# Patient Record
Sex: FEMALE | Race: WHITE | Employment: PART TIME | URBAN - METROPOLITAN AREA
[De-identification: names, ages, dates, MRNs, and addresses within clinical notes are randomized per-mention and may not be internally consistent; named-entity substitution may affect disease eponyms.]

---

## 2017-01-13 ENCOUNTER — OFFICE VISIT (OUTPATIENT)
Dept: FAMILY MEDICINE CLINIC | Age: 27
End: 2017-01-13

## 2017-01-13 VITALS
RESPIRATION RATE: 16 BRPM | WEIGHT: 133 LBS | HEIGHT: 61 IN | DIASTOLIC BLOOD PRESSURE: 70 MMHG | TEMPERATURE: 98.5 F | SYSTOLIC BLOOD PRESSURE: 120 MMHG | BODY MASS INDEX: 25.11 KG/M2 | HEART RATE: 98 BPM

## 2017-01-13 DIAGNOSIS — F33.1 MODERATE EPISODE OF RECURRENT MAJOR DEPRESSIVE DISORDER (HCC): Primary | ICD-10-CM

## 2017-01-13 PROCEDURE — 99214 OFFICE O/P EST MOD 30 MIN: CPT | Performed by: FAMILY MEDICINE

## 2017-01-13 RX ORDER — FLUOXETINE HYDROCHLORIDE 20 MG/1
20 CAPSULE ORAL DAILY
Qty: 30 CAPSULE | Refills: 2 | Status: SHIPPED | OUTPATIENT
Start: 2017-01-13 | End: 2017-03-30 | Stop reason: SDUPTHER

## 2017-01-13 RX ORDER — QUETIAPINE FUMARATE 50 MG/1
TABLET, EXTENDED RELEASE ORAL
Qty: 60 TABLET | Refills: 0 | Status: SHIPPED | OUTPATIENT
Start: 2017-01-13 | End: 2017-02-08

## 2017-01-17 ENCOUNTER — TELEPHONE (OUTPATIENT)
Dept: FAMILY MEDICINE CLINIC | Age: 27
End: 2017-01-17

## 2017-01-19 ENCOUNTER — TELEPHONE (OUTPATIENT)
Dept: FAMILY MEDICINE CLINIC | Age: 27
End: 2017-01-19

## 2017-01-20 ENCOUNTER — TELEPHONE (OUTPATIENT)
Dept: FAMILY MEDICINE CLINIC | Age: 27
End: 2017-01-20

## 2017-01-23 ENCOUNTER — TELEPHONE (OUTPATIENT)
Dept: FAMILY MEDICINE CLINIC | Age: 27
End: 2017-01-23

## 2017-02-08 RX ORDER — ARIPIPRAZOLE 5 MG/1
5 TABLET ORAL NIGHTLY
Qty: 30 TABLET | Refills: 1 | Status: SHIPPED | OUTPATIENT
Start: 2017-02-08 | End: 2017-03-30 | Stop reason: SDUPTHER

## 2017-03-30 ENCOUNTER — OFFICE VISIT (OUTPATIENT)
Dept: FAMILY MEDICINE CLINIC | Age: 27
End: 2017-03-30

## 2017-03-30 VITALS
RESPIRATION RATE: 20 BRPM | HEIGHT: 61 IN | BODY MASS INDEX: 24.73 KG/M2 | DIASTOLIC BLOOD PRESSURE: 70 MMHG | HEART RATE: 74 BPM | WEIGHT: 131 LBS | SYSTOLIC BLOOD PRESSURE: 112 MMHG

## 2017-03-30 DIAGNOSIS — F33.1 MODERATE EPISODE OF RECURRENT MAJOR DEPRESSIVE DISORDER (HCC): ICD-10-CM

## 2017-03-30 PROCEDURE — 99214 OFFICE O/P EST MOD 30 MIN: CPT | Performed by: FAMILY MEDICINE

## 2017-03-30 RX ORDER — ARIPIPRAZOLE 5 MG/1
5 TABLET ORAL NIGHTLY
Qty: 30 TABLET | Refills: 3 | Status: ON HOLD | OUTPATIENT
Start: 2017-03-30 | End: 2019-01-14

## 2017-03-30 RX ORDER — FLUOXETINE HYDROCHLORIDE 20 MG/1
20 CAPSULE ORAL DAILY
Qty: 30 CAPSULE | Refills: 3 | Status: ON HOLD | OUTPATIENT
Start: 2017-03-30 | End: 2019-01-14

## 2018-07-23 LAB
ABO/RH: NORMAL
ANTIBODY SCREEN: NORMAL
HEPATITIS C ANTIBODY INTERPRETATION: NORMAL

## 2018-07-24 LAB
BASOPHILS ABSOLUTE: 0.1 K/UL (ref 0–0.2)
BASOPHILS RELATIVE PERCENT: 0.5 %
EOSINOPHILS ABSOLUTE: 0 K/UL (ref 0–0.6)
EOSINOPHILS RELATIVE PERCENT: 0.4 %
HCT VFR BLD CALC: 39.5 % (ref 36–48)
HEMOGLOBIN: 13.8 G/DL (ref 12–16)
HEPATITIS B SURFACE ANTIGEN INTERPRETATION: NORMAL
HIV AG/AB: NORMAL
HIV ANTIGEN: NORMAL
HIV-1 ANTIBODY: NORMAL
HIV-2 AB: NORMAL
LYMPHOCYTES ABSOLUTE: 1.9 K/UL (ref 1–5.1)
LYMPHOCYTES RELATIVE PERCENT: 18.4 %
MCH RBC QN AUTO: 30.7 PG (ref 26–34)
MCHC RBC AUTO-ENTMCNC: 34.9 G/DL (ref 31–36)
MCV RBC AUTO: 88.1 FL (ref 80–100)
MONOCYTES ABSOLUTE: 1 K/UL (ref 0–1.3)
MONOCYTES RELATIVE PERCENT: 9.1 %
NEUTROPHILS ABSOLUTE: 7.5 K/UL (ref 1.7–7.7)
NEUTROPHILS RELATIVE PERCENT: 71.6 %
PDW BLD-RTO: 12.4 % (ref 12.4–15.4)
PLATELET # BLD: 326 K/UL (ref 135–450)
PMV BLD AUTO: 9.7 FL (ref 5–10.5)
RBC # BLD: 4.48 M/UL (ref 4–5.2)
RPR: NORMAL
RUBELLA ANTIBODY IGG: 344.6 IU/ML
WBC # BLD: 10.4 K/UL (ref 4–11)

## 2018-07-26 LAB
ORGANISM: ABNORMAL
URINE CULTURE, ROUTINE: ABNORMAL
URINE CULTURE, ROUTINE: ABNORMAL

## 2018-07-30 LAB
HPV COMMENT: ABNORMAL
HPV TYPE 16: NOT DETECTED
HPV TYPE 18: NOT DETECTED
HPVOH (OTHER TYPES): DETECTED

## 2018-08-29 LAB — URINE CULTURE, ROUTINE: NORMAL

## 2018-09-11 LAB
ANION GAP SERPL CALCULATED.3IONS-SCNC: 11 MMOL/L (ref 3–16)
BUN BLDV-MCNC: 5 MG/DL (ref 7–20)
CALCIUM SERPL-MCNC: 9 MG/DL (ref 8.3–10.6)
CHLORIDE BLD-SCNC: 105 MMOL/L (ref 99–110)
CO2: 21 MMOL/L (ref 21–32)
CREAT SERPL-MCNC: <0.5 MG/DL (ref 0.6–1.1)
GFR AFRICAN AMERICAN: >60
GFR NON-AFRICAN AMERICAN: >60
GLUCOSE BLD-MCNC: 99 MG/DL (ref 70–99)
GLUCOSE CHALLENGE: 92 MG/DL
HCT VFR BLD CALC: 35.8 % (ref 36–48)
HEMOGLOBIN: 12.2 G/DL (ref 12–16)
MCH RBC QN AUTO: 30.6 PG (ref 26–34)
MCHC RBC AUTO-ENTMCNC: 34.2 G/DL (ref 31–36)
MCV RBC AUTO: 89.4 FL (ref 80–100)
PDW BLD-RTO: 13.4 % (ref 12.4–15.4)
PLATELET # BLD: 270 K/UL (ref 135–450)
PMV BLD AUTO: 9.9 FL (ref 5–10.5)
POTASSIUM SERPL-SCNC: 4 MMOL/L (ref 3.5–5.1)
RBC # BLD: 4 M/UL (ref 4–5.2)
SODIUM BLD-SCNC: 137 MMOL/L (ref 136–145)
WBC # BLD: 9.2 K/UL (ref 4–11)

## 2018-10-01 ENCOUNTER — HOSPITAL ENCOUNTER (EMERGENCY)
Age: 28
Discharge: HOME | End: 2018-10-01
Attending: EMERGENCY MEDICINE
Payer: MEDICARE

## 2018-10-01 ENCOUNTER — APPOINTMENT (OUTPATIENT)
Dept: ULTRASOUND IMAGING | Age: 28
End: 2018-10-01
Payer: MEDICARE

## 2018-10-01 VITALS
OXYGEN SATURATION: 100 % | RESPIRATION RATE: 18 BRPM | DIASTOLIC BLOOD PRESSURE: 74 MMHG | HEIGHT: 61 IN | HEART RATE: 74 BPM | WEIGHT: 149 LBS | BODY MASS INDEX: 28.13 KG/M2 | TEMPERATURE: 99.7 F | SYSTOLIC BLOOD PRESSURE: 122 MMHG

## 2018-10-01 DIAGNOSIS — O26.899 ABDOMINAL PAIN DURING PREGNANCY, ANTEPARTUM: ICD-10-CM

## 2018-10-01 DIAGNOSIS — N30.00 ACUTE CYSTITIS WITHOUT HEMATURIA: Primary | ICD-10-CM

## 2018-10-01 DIAGNOSIS — N39.0 COMPLICATED UTI (URINARY TRACT INFECTION): ICD-10-CM

## 2018-10-01 DIAGNOSIS — R10.9 ABDOMINAL PAIN DURING PREGNANCY, ANTEPARTUM: ICD-10-CM

## 2018-10-01 LAB
ANION GAP SERPL CALCULATED.3IONS-SCNC: 12 MMOL/L (ref 3–16)
BACTERIA: ABNORMAL /HPF
BILIRUBIN URINE: NEGATIVE
BLOOD, URINE: NEGATIVE
BUN BLDV-MCNC: 6 MG/DL (ref 7–20)
CALCIUM SERPL-MCNC: 9.5 MG/DL (ref 8.3–10.6)
CHLORIDE BLD-SCNC: 105 MMOL/L (ref 99–110)
CLARITY: ABNORMAL
CO2: 20 MMOL/L (ref 21–32)
COLOR: YELLOW
CREAT SERPL-MCNC: <0.5 MG/DL (ref 0.6–1.1)
EPITHELIAL CELLS, UA: 4 /HPF (ref 0–5)
GFR AFRICAN AMERICAN: >60
GFR NON-AFRICAN AMERICAN: >60
GLUCOSE BLD-MCNC: 97 MG/DL (ref 70–99)
GLUCOSE URINE: NEGATIVE MG/DL
HCT VFR BLD CALC: 36.7 % (ref 36–48)
HEMOGLOBIN: 12.8 G/DL (ref 12–16)
HYALINE CASTS: 4 /LPF (ref 0–8)
KETONES, URINE: NEGATIVE MG/DL
LEUKOCYTE ESTERASE, URINE: ABNORMAL
MCH RBC QN AUTO: 31.1 PG (ref 26–34)
MCHC RBC AUTO-ENTMCNC: 34.9 G/DL (ref 31–36)
MCV RBC AUTO: 89.1 FL (ref 80–100)
MICROSCOPIC EXAMINATION: YES
NITRITE, URINE: NEGATIVE
PDW BLD-RTO: 12.7 % (ref 12.4–15.4)
PH UA: 6.5
PLATELET # BLD: 293 K/UL (ref 135–450)
PMV BLD AUTO: 9.1 FL (ref 5–10.5)
POTASSIUM SERPL-SCNC: 3.9 MMOL/L (ref 3.5–5.1)
PROTEIN UA: NEGATIVE MG/DL
RBC # BLD: 4.12 M/UL (ref 4–5.2)
RBC UA: 2 /HPF (ref 0–4)
SODIUM BLD-SCNC: 137 MMOL/L (ref 136–145)
SPECIFIC GRAVITY UA: 1.02
URINE TYPE: ABNORMAL
UROBILINOGEN, URINE: 0.2 E.U./DL
WBC # BLD: 10.8 K/UL (ref 4–11)
WBC UA: 20 /HPF (ref 0–5)

## 2018-10-01 PROCEDURE — 81001 URINALYSIS AUTO W/SCOPE: CPT

## 2018-10-01 PROCEDURE — 80048 BASIC METABOLIC PNL TOTAL CA: CPT

## 2018-10-01 PROCEDURE — 99284 EMERGENCY DEPT VISIT MOD MDM: CPT

## 2018-10-01 PROCEDURE — 6370000000 HC RX 637 (ALT 250 FOR IP): Performed by: PHYSICIAN ASSISTANT

## 2018-10-01 PROCEDURE — 85027 COMPLETE CBC AUTOMATED: CPT

## 2018-10-01 PROCEDURE — 76815 OB US LIMITED FETUS(S): CPT

## 2018-10-01 RX ORDER — CEFUROXIME AXETIL 250 MG/1
250 TABLET ORAL 2 TIMES DAILY
Qty: 20 TABLET | Refills: 0 | Status: SHIPPED | OUTPATIENT
Start: 2018-10-01 | End: 2018-10-11

## 2018-10-01 RX ORDER — NITROFURANTOIN 25; 75 MG/1; MG/1
100 CAPSULE ORAL ONCE
Status: COMPLETED | OUTPATIENT
Start: 2018-10-01 | End: 2018-10-01

## 2018-10-01 RX ORDER — ACETAMINOPHEN 500 MG
1000 TABLET ORAL ONCE
Status: COMPLETED | OUTPATIENT
Start: 2018-10-01 | End: 2018-10-01

## 2018-10-01 RX ADMIN — ACETAMINOPHEN 1000 MG: 500 TABLET ORAL at 01:31

## 2018-10-01 RX ADMIN — NITROFURANTOIN MONOHYDRATE/MACROCRYSTALLINE 100 MG: 25; 75 CAPSULE ORAL at 01:32

## 2018-10-01 ASSESSMENT — ENCOUNTER SYMPTOMS
WHEEZING: 0
COLOR CHANGE: 0
DIARRHEA: 0
BLOOD IN STOOL: 0
NAUSEA: 0
ANAL BLEEDING: 0
CONSTIPATION: 0
COUGH: 0
BACK PAIN: 0
ABDOMINAL DISTENTION: 0
VOMITING: 0
STRIDOR: 0
RECTAL PAIN: 0
SHORTNESS OF BREATH: 0

## 2018-10-01 ASSESSMENT — PAIN SCALES - GENERAL
PAINLEVEL_OUTOF10: 5
PAINLEVEL_OUTOF10: 3

## 2018-10-01 ASSESSMENT — PAIN DESCRIPTION - PROGRESSION: CLINICAL_PROGRESSION: GRADUALLY IMPROVING

## 2018-10-01 NOTE — ED PROVIDER NOTES
[address] [facility]  eMERGENCY dEPARTMENT eNCOUnter        Pt Name: Regina Kiser  MRN: [id number]  [place] 1990  Date of evaluation: 10/1/2018  Provider: Milton Birmingham PA-C  PCP: Jeimy Santos MD  ED Attending: Maile Pablo DO    CHIEF COMPLAINT       Chief Complaint   Patient presents with    Abdominal Pain     abd pain in pregnancy described as efren smith contractions for the past few days, more intense pain started a few hours ago, 5th pregnancy 2 living children, denies vaginal bleeding. see Dr. Reid File . denies dysuria having nausea        HISTORY OF PRESENT ILLNESS   (Location/Symptom, Timing/Onset, Context/Setting, Quality, Duration, Modifying Factors, Severity)  Note limiting factors. Regina Kiser is a 33 y.o. female with history of preeclampsia, gestational diabetes, anxiety, depression who presents to the emergency department with low abdominal pain and pregnancy. She approximates herself to be 18 weeks pregnant with a last menstrual cycle in May. Obstetric history is  A2 L2. Dr. Reid File is her ObGyn. At 12 weeks, she had an ultrasound which showed intrauterine pregnancy. She denies any vaginal bleeding or discharge or urinary symptoms. She rates her pain to be a 5 out of 10 on pain scale. Nursing Notes were all reviewed and agreed with or any disagreements were addressed  in the HPI. REVIEW OF SYSTEMS    (2-9 systems for level 4, 10 or more for level 5)     Review of Systems   Constitutional: Negative for chills and fever. Eyes: Negative for visual disturbance. Respiratory: Negative for cough, shortness of breath, wheezing and stridor. Cardiovascular: Negative for chest pain, palpitations and leg swelling. Gastrointestinal: Negative for abdominal distention, anal bleeding, blood in stool, constipation, diarrhea, nausea, rectal pain and vomiting. Genitourinary: Positive for pelvic pain.  Negative for decreased urine Family History   Problem Relation Age of Onset    Arthritis Mother     Depression Mother     Arthritis Father     Depression Father     Hearing Loss Sister     Learning Disabilities Brother     Mental Retardation Brother     Cancer Maternal Grandmother     Cancer Paternal Grandfather     Kidney Disease Paternal Grandfather           SOCIAL HISTORY       Social History     Social History    Marital status:      Spouse name: N/A    Number of children: N/A    Years of education: N/A     Social History Main Topics    Smoking status: Former Smoker     Packs/day: 0.50     Types: Cigarettes    Smokeless tobacco: Never Used      Comment: did quit but started again    Alcohol use 0.6 oz/week     1 Glasses of wine per week    Drug use: No    Sexual activity: Yes     Partners: Male     Other Topics Concern    None     Social History Narrative    None       SCREENINGS             PHYSICAL EXAM    (up to 7 for level 4, 8 or more for level 5)     ED Triage Vitals [10/01/18 0022]   BP Temp Temp src Pulse Resp SpO2 Height Weight   126/78 99.7 °F (37.6 °C) -- 96 22 98 % 5' 1\" (1.549 m) 149 lb (67.6 kg)       Physical Exam   Constitutional: She is oriented to person, place, and time. She appears well-developed and well-nourished. No distress. HENT:   Head: Normocephalic and atraumatic. Right Ear: External ear normal.   Left Ear: External ear normal.   Nose: Nose normal.   Eyes: Conjunctivae are normal. Right eye exhibits no discharge. Left eye exhibits no discharge. No scleral icterus. Neck: Normal range of motion. No JVD present. No Brudzinski's sign and no Kernig's sign noted. Cardiovascular: Normal rate. Pulmonary/Chest: Effort normal and breath sounds normal. No stridor. Abdominal: Soft. Bowel sounds are normal. She exhibits no abdominal bruit and no pulsatile midline mass. There is tenderness in the suprapubic area.  There is no rigidity, no rebound, no guarding, no CVA tenderness, no visualized and preliminarily interpreted by the  ED Provider with the below findings:        Interpretation per the Radiologist below, if available at the time of this note:    US OB 1 [facility]   Final Result   Single live intrauterine pregnancy with gestational age of 12 weeks and 6   days by current sonographic biometry. The estimated due date is 03/05/2019. Normal flow in both ovaries. No results found. PROCEDURES   Unless otherwise noted below, none     Procedures    CRITICAL CARE TIME   N/A    CONSULTS:  None      EMERGENCY DEPARTMENT COURSE and DIFFERENTIAL DIAGNOSIS/MDM:   Vitals:    Vitals:    10/01/18 0022   BP: 126/78   Pulse: 96   Resp: 22   Temp: 99.7 °F (37.6 °C)   SpO2: 98%   Weight: 149 lb (67.6 kg)   Height: 5' 1\" (1.549 m)       Patient was given the following medications:  Medications   nitrofurantoin (macrocrystal-monohydrate) (MACROBID) capsule 100 mg (100 mg Oral Given 10/1/18 0132)   acetaminophen (TYLENOL) tablet 1,000 mg (1,000 mg Oral Given 10/1/18 0131)       This patient presents complaining of low abdominal pain in pregnancy. Urinalysis suggests infection. Therefore, will be sent home with antibiotic. Transvaginal ultrasound confirms intrauterine pregnancy. Other blood work stable. Renal function preserved. My suspicion is low for preeclampsia, help syndrome,  Miscarriage, acute surgical abdomen, obstruction, perforation, abscess, mesenteric ischemia, AAA, dissection, cholecystitis, cholangitis, pancreatitis, appendicitis, C. diff colitis, diverticulitis, twisted bowel, volvulus, incarcerated hernia, necrotizing fasciitis, rectal abscess, TOA, ovarian torsion, PID, ectopic pregnancy, Chris-Primitivo Zack syndrome, urosepsis, kidney stone, pyelonephritis, perinephric abscess or other concerning pathology. Follow-up with ObGyn for recheck and may return ED per discharge instructions. Vitals stable here, and patient is stable for discharge.  We have

## 2018-10-04 ENCOUNTER — TELEPHONE (OUTPATIENT)
Dept: FAMILY MEDICINE CLINIC | Age: 28
End: 2018-10-04

## 2018-10-04 NOTE — TELEPHONE ENCOUNTER
[facility] Trans[facility] Initial Follow Up Call    Outreach made within 2 business days of discharge: No    Patient: Emmett Hurtado Patient : 1990   MRN: [id number]  Reason for Admission: There are no discharge diagnoses documented for the most recent discharge. Discharge Date: 10/1/18       Spoke with: RYAN for patient to call back     Discharge department/facility: [address] [place] [address] Patient Contact:  Was patient able to fill all prescriptions: N/A  Was patient instructed to bring all medications to the follow-up visit: N/A  Is patient taking all medications as directed in the discharge summary? N/A  Does patient understand their discharge instructions: N/A  Does patient have questions or concerns that need addressed prior to 7-14 day follow up office visit: N/A    Patient needs to follow up if her UTI returns per provider. Scheduled appointment with PCP within 7-14 days    Follow Up  No future appointments.     Sam Thomson

## 2018-10-18 LAB
CYTOMEGALOVIRUS IGM ANTIBODY: <8 AU/ML
HERPES TYPE 1/2 IGM COMBINED: 0.58 IV
MISCELLANEOUS LAB TEST ORDER: NORMAL
RUBELLA IGM: <10 AU/ML
TOXOPLASMA GONDI AB IGM: <3 AU/ML

## 2018-12-03 LAB
GLUCOSE CHALLENGE: 165 MG/DL
HCT VFR BLD CALC: 32.9 % (ref 36–48)
HEMOGLOBIN: 10.8 G/DL (ref 12–16)
MCH RBC QN AUTO: 29.2 PG (ref 26–34)
MCHC RBC AUTO-ENTMCNC: 32.8 G/DL (ref 31–36)
MCV RBC AUTO: 88.8 FL (ref 80–100)
PDW BLD-RTO: 13.1 % (ref 12.4–15.4)
PLATELET # BLD: 268 K/UL (ref 135–450)
PMV BLD AUTO: 9.6 FL (ref 5–10.5)
RBC # BLD: 3.7 M/UL (ref 4–5.2)
WBC # BLD: 9.2 K/UL (ref 4–11)

## 2019-01-02 ENCOUNTER — HOSPITAL ENCOUNTER (OUTPATIENT)
Dept: OTHER | Age: 29
Discharge: HOME | End: 2019-01-02
Payer: MEDICARE

## 2019-01-02 LAB
GLUCOSE FASTING: 88 MG/DL
GLUCOSE TOLERANCE TEST 1 HOUR: 158 MG/DL
GLUCOSE TOLERANCE TEST 2 HOUR: 112 MG/DL
GLUCOSE TOLERANCE TEST 3 HOUR: 114 MG/DL

## 2019-01-02 PROCEDURE — 36415 COLL VENOUS BLD VENIPUNCTURE: CPT

## 2019-01-02 PROCEDURE — 82951 GLUCOSE TOLERANCE TEST (GTT): CPT

## 2019-01-02 PROCEDURE — 82952 GTT-ADDED SAMPLES: CPT

## 2019-01-04 ENCOUNTER — HOSPITAL ENCOUNTER (OUTPATIENT)
Age: 29
Setting detail: OBSERVATION
Discharge: ANOTHER HOSPITAL | End: 2019-01-04
Attending: OBSTETRICS & GYNECOLOGY | Admitting: OBSTETRICS & GYNECOLOGY
Payer: MEDICARE

## 2019-01-04 VITALS
WEIGHT: 153 LBS | DIASTOLIC BLOOD PRESSURE: 71 MMHG | RESPIRATION RATE: 18 BRPM | HEART RATE: 98 BPM | TEMPERATURE: 98 F | SYSTOLIC BLOOD PRESSURE: 126 MMHG | HEIGHT: 61 IN | BODY MASS INDEX: 28.89 KG/M2

## 2019-01-04 LAB
AMPHETAMINE SCREEN, URINE: NORMAL
BACTERIA: ABNORMAL /HPF
BARBITURATE SCREEN URINE: NORMAL
BASOPHILS ABSOLUTE: 0 K/UL (ref 0–0.2)
BASOPHILS RELATIVE PERCENT: 0.3 %
BENZODIAZEPINE SCREEN, URINE: NORMAL
BILIRUBIN URINE: NEGATIVE
BLOOD, URINE: NEGATIVE
BUPRENORPHINE URINE: NORMAL
CANNABINOID SCREEN URINE: NORMAL
CLARITY: ABNORMAL
COCAINE METABOLITE SCREEN URINE: NORMAL
COLOR: YELLOW
EOSINOPHILS ABSOLUTE: 0.1 K/UL (ref 0–0.6)
EOSINOPHILS RELATIVE PERCENT: 0.5 %
EPITHELIAL CELLS, UA: 3 /HPF (ref 0–5)
GLUCOSE URINE: NEGATIVE MG/DL
HCT VFR BLD CALC: 29.5 % (ref 36–48)
HEMOGLOBIN: 9.8 G/DL (ref 12–16)
HYALINE CASTS: 2 /LPF (ref 0–8)
KETONES, URINE: NEGATIVE MG/DL
LEUKOCYTE ESTERASE, URINE: ABNORMAL
LYMPHOCYTES ABSOLUTE: 2 K/UL (ref 1–5.1)
LYMPHOCYTES RELATIVE PERCENT: 18.5 %
Lab: NORMAL
MCH RBC QN AUTO: 27.4 PG (ref 26–34)
MCHC RBC AUTO-ENTMCNC: 33.3 G/DL (ref 31–36)
MCV RBC AUTO: 82.4 FL (ref 80–100)
METHADONE SCREEN, URINE: NORMAL
MICROSCOPIC EXAMINATION: YES
MONOCYTES ABSOLUTE: 0.8 K/UL (ref 0–1.3)
MONOCYTES RELATIVE PERCENT: 7.8 %
NEUTROPHILS ABSOLUTE: 7.9 K/UL (ref 1.7–7.7)
NEUTROPHILS RELATIVE PERCENT: 72.9 %
NITRITE, URINE: NEGATIVE
OPIATE SCREEN URINE: NORMAL
OXYCODONE URINE: NORMAL
PDW BLD-RTO: 14 % (ref 12.4–15.4)
PH UA: 5.5
PH UA: 6
PHENCYCLIDINE SCREEN URINE: NORMAL
PLATELET # BLD: 257 K/UL (ref 135–450)
PMV BLD AUTO: 9.3 FL (ref 5–10.5)
PROPOXYPHENE SCREEN: NORMAL
PROTEIN UA: NEGATIVE MG/DL
RBC # BLD: 3.58 M/UL (ref 4–5.2)
RBC UA: 4 /HPF (ref 0–4)
SPECIFIC GRAVITY UA: 1.01
SPECIMEN STATUS: NORMAL
TOTAL SYPHILLIS IGG/IGM: NORMAL
URINE TYPE: ABNORMAL
UROBILINOGEN, URINE: 0.2 E.U./DL
WBC # BLD: 10.8 K/UL (ref 4–11)
WBC UA: 202 /HPF (ref 0–5)

## 2019-01-04 PROCEDURE — G0378 HOSPITAL OBSERVATION PER HR: HCPCS

## 2019-01-04 PROCEDURE — 51702 INSERT TEMP BLADDER CATH: CPT

## 2019-01-04 PROCEDURE — 85025 COMPLETE CBC W/AUTO DIFF WBC: CPT

## 2019-01-04 PROCEDURE — 96375 TX/PRO/DX INJ NEW DRUG ADDON: CPT

## 2019-01-04 PROCEDURE — 81001 URINALYSIS AUTO W/SCOPE: CPT

## 2019-01-04 PROCEDURE — 6360000002 HC RX W HCPCS

## 2019-01-04 PROCEDURE — 96372 THER/PROPH/DIAG INJ SC/IM: CPT

## 2019-01-04 PROCEDURE — 80307 DRUG TEST PRSMV CHEM ANLYZR: CPT

## 2019-01-04 PROCEDURE — 99211 OFF/OP EST MAY X REQ PHY/QHP: CPT

## 2019-01-04 PROCEDURE — 96365 THER/PROPH/DIAG IV INF INIT: CPT

## 2019-01-04 PROCEDURE — 6360000002 HC RX W HCPCS: Performed by: OBSTETRICS & GYNECOLOGY

## 2019-01-04 PROCEDURE — 86780 TREPONEMA PALLIDUM: CPT

## 2019-01-04 PROCEDURE — 2580000003 HC RX 258

## 2019-01-04 RX ORDER — ACETAMINOPHEN 500 MG
500 TABLET ORAL EVERY 6 HOURS PRN
Status: ON HOLD | COMMUNITY
End: 2019-01-28 | Stop reason: HOSPADM

## 2019-01-04 RX ORDER — TERBUTALINE SULFATE 1 MG/ML
INJECTION, SOLUTION SUBCUTANEOUS
Status: DISCONTINUED
Start: 2019-01-04 | End: 2019-01-04 | Stop reason: HOSPADM

## 2019-01-04 RX ORDER — TERBUTALINE SULFATE 1 MG/ML
INJECTION, SOLUTION SUBCUTANEOUS
Status: COMPLETED
Start: 2019-01-04 | End: 2019-01-04

## 2019-01-04 RX ORDER — TERBUTALINE SULFATE 1 MG/ML
0.25 INJECTION, SOLUTION SUBCUTANEOUS ONCE
Status: COMPLETED | OUTPATIENT
Start: 2019-01-04 | End: 2019-01-04

## 2019-01-04 RX ORDER — MAGNESIUM SULFATE IN WATER 40 MG/ML
4 INJECTION, SOLUTION INTRAVENOUS ONCE
Status: COMPLETED | OUTPATIENT
Start: 2019-01-04 | End: 2019-01-04

## 2019-01-04 RX ORDER — BETAMETHASONE SODIUM PHOSPHATE AND BETAMETHASONE ACETATE 3; 3 MG/ML; MG/ML
12 INJECTION, SUSPENSION INTRA-ARTICULAR; INTRALESIONAL; INTRAMUSCULAR; SOFT TISSUE ONCE
Status: COMPLETED | OUTPATIENT
Start: 2019-01-04 | End: 2019-01-04

## 2019-01-04 RX ORDER — SODIUM CHLORIDE, SODIUM LACTATE, POTASSIUM CHLORIDE, CALCIUM CHLORIDE 600; 310; 30; 20 MG/100ML; MG/100ML; MG/100ML; MG/100ML
INJECTION, SOLUTION INTRAVENOUS
Status: COMPLETED
Start: 2019-01-04 | End: 2019-01-04

## 2019-01-04 RX ORDER — SODIUM CHLORIDE, SODIUM LACTATE, POTASSIUM CHLORIDE, CALCIUM CHLORIDE 600; 310; 30; 20 MG/100ML; MG/100ML; MG/100ML; MG/100ML
INJECTION, SOLUTION INTRAVENOUS CONTINUOUS
Status: DISCONTINUED | OUTPATIENT
Start: 2019-01-04 | End: 2019-01-04 | Stop reason: HOSPADM

## 2019-01-04 RX ADMIN — MAGNESIUM SULFATE IN WATER 4 G: 40 INJECTION, SOLUTION INTRAVENOUS at 04:00

## 2019-01-04 RX ADMIN — Medication 12 MG: at 04:10

## 2019-01-04 RX ADMIN — SODIUM CHLORIDE, SODIUM LACTATE, POTASSIUM CHLORIDE, CALCIUM CHLORIDE: 600; 310; 30; 20 INJECTION, SOLUTION INTRAVENOUS at 03:50

## 2019-01-04 RX ADMIN — TERBUTALINE SULFATE 0.25 MG: 1 INJECTION SUBCUTANEOUS at 05:12

## 2019-01-04 RX ADMIN — SODIUM CHLORIDE, POTASSIUM CHLORIDE, SODIUM LACTATE AND CALCIUM CHLORIDE: 600; 310; 30; 20 INJECTION, SOLUTION INTRAVENOUS at 03:50

## 2019-01-04 RX ADMIN — TERBUTALINE SULFATE 0.25 MG: 1 INJECTION, SOLUTION SUBCUTANEOUS at 08:10

## 2019-01-04 RX ADMIN — TERBUTALINE SULFATE 0.25 MG: 1 INJECTION, SOLUTION SUBCUTANEOUS at 05:12

## 2019-01-04 RX ADMIN — TERBUTALINE SULFATE 0.25 MG: 1 INJECTION SUBCUTANEOUS at 08:10

## 2019-01-04 RX ADMIN — TERBUTALINE SULFATE 0.25 MG: 1 INJECTION SUBCUTANEOUS at 02:18

## 2019-01-14 ENCOUNTER — HOSPITAL ENCOUNTER (OUTPATIENT)
Age: 29
Discharge: HOME | End: 2019-01-14
Attending: OBSTETRICS & GYNECOLOGY | Admitting: OBSTETRICS & GYNECOLOGY
Payer: MEDICARE

## 2019-01-14 VITALS
HEART RATE: 93 BPM | TEMPERATURE: 97.9 F | SYSTOLIC BLOOD PRESSURE: 123 MMHG | RESPIRATION RATE: 18 BRPM | DIASTOLIC BLOOD PRESSURE: 83 MMHG

## 2019-01-14 LAB
BACTERIA: ABNORMAL /HPF
BILIRUBIN URINE: NEGATIVE
BLOOD, URINE: NEGATIVE
CLARITY: ABNORMAL
COLOR: YELLOW
EPITHELIAL CELLS, UA: 2 /HPF (ref 0–5)
GLUCOSE URINE: NEGATIVE MG/DL
HYALINE CASTS: 2 /LPF (ref 0–8)
KETONES, URINE: ABNORMAL MG/DL
LEUKOCYTE ESTERASE, URINE: ABNORMAL
MICROSCOPIC EXAMINATION: YES
NITRITE, URINE: NEGATIVE
PH UA: 6
PROTEIN UA: NEGATIVE MG/DL
RBC UA: 2 /HPF (ref 0–4)
SPECIFIC GRAVITY UA: 1.01
URINE TYPE: ABNORMAL
UROBILINOGEN, URINE: 1 E.U./DL
WBC UA: 20 /HPF (ref 0–5)

## 2019-01-14 PROCEDURE — 59025 FETAL NON-STRESS TEST: CPT

## 2019-01-14 PROCEDURE — 81001 URINALYSIS AUTO W/SCOPE: CPT

## 2019-01-14 PROCEDURE — 99211 OFF/OP EST MAY X REQ PHY/QHP: CPT

## 2019-01-14 RX ORDER — FERROUS SULFATE 325(65) MG
325 TABLET ORAL
COMMUNITY
End: 2019-02-05

## 2019-01-14 RX ORDER — NIFEDIPINE 10 MG/1
10 CAPSULE ORAL 3 TIMES DAILY
Status: ON HOLD | COMMUNITY
End: 2019-01-28 | Stop reason: HOSPADM

## 2019-01-28 ENCOUNTER — ANESTHESIA EVENT (OUTPATIENT)
Dept: LABOR AND DELIVERY | Age: 29
DRG: 560 | End: 2019-01-28
Payer: MEDICARE

## 2019-01-28 ENCOUNTER — HOSPITAL ENCOUNTER (INPATIENT)
Age: 29
LOS: 2 days | Discharge: HOME | DRG: 560 | End: 2019-01-30
Attending: OBSTETRICS & GYNECOLOGY | Admitting: OBSTETRICS & GYNECOLOGY
Payer: MEDICARE

## 2019-01-28 ENCOUNTER — ANESTHESIA (OUTPATIENT)
Dept: LABOR AND DELIVERY | Age: 29
DRG: 560 | End: 2019-01-28
Payer: MEDICARE

## 2019-01-28 DIAGNOSIS — Z37.9 NORMAL LABOR: Primary | ICD-10-CM

## 2019-01-28 LAB
AMPHETAMINE SCREEN, URINE: NORMAL
BARBITURATE SCREEN URINE: NORMAL
BENZODIAZEPINE SCREEN, URINE: NORMAL
BUPRENORPHINE URINE: NORMAL
CANNABINOID SCREEN URINE: NORMAL
COCAINE METABOLITE SCREEN URINE: NORMAL
HCT VFR BLD CALC: 31.8 % (ref 36–48)
HEMOGLOBIN: 10.4 G/DL (ref 12–16)
Lab: NORMAL
MCH RBC QN AUTO: 25.8 PG (ref 26–34)
MCHC RBC AUTO-ENTMCNC: 32.5 G/DL (ref 31–36)
MCV RBC AUTO: 79.3 FL (ref 80–100)
METHADONE SCREEN, URINE: NORMAL
OPIATE SCREEN URINE: NORMAL
OXYCODONE URINE: NORMAL
PDW BLD-RTO: 15.9 % (ref 12.4–15.4)
PH UA: 5
PHENCYCLIDINE SCREEN URINE: NORMAL
PLATELET # BLD: 230 K/UL (ref 135–450)
PMV BLD AUTO: 9.2 FL (ref 5–10.5)
PROPOXYPHENE SCREEN: NORMAL
RBC # BLD: 4.01 M/UL (ref 4–5.2)
SPECIMEN STATUS: NORMAL
WBC # BLD: 9.9 K/UL (ref 4–11)

## 2019-01-28 PROCEDURE — 85027 COMPLETE CBC AUTOMATED: CPT

## 2019-01-28 PROCEDURE — 7200000001 HC VAGINAL DELIVERY

## 2019-01-28 PROCEDURE — 86780 TREPONEMA PALLIDUM: CPT

## 2019-01-28 PROCEDURE — 6360000002 HC RX W HCPCS: Performed by: OBSTETRICS & GYNECOLOGY

## 2019-01-28 PROCEDURE — 2580000003 HC RX 258

## 2019-01-28 PROCEDURE — 2580000003 HC RX 258: Performed by: OBSTETRICS & GYNECOLOGY

## 2019-01-28 PROCEDURE — 3700000025 ANESTHESIA EPIDURAL BLOCK: Performed by: ANESTHESIOLOGY

## 2019-01-28 PROCEDURE — 51702 INSERT TEMP BLADDER CATH: CPT

## 2019-01-28 PROCEDURE — 1220000000 HC SEMI PRIVATE OB R&B

## 2019-01-28 PROCEDURE — 6370000000 HC RX 637 (ALT 250 FOR IP): Performed by: OBSTETRICS & GYNECOLOGY

## 2019-01-28 PROCEDURE — 80307 DRUG TEST PRSMV CHEM ANLYZR: CPT

## 2019-01-28 PROCEDURE — 2500000003 HC RX 250 WO HCPCS: Performed by: NURSE ANESTHETIST, CERTIFIED REGISTERED

## 2019-01-28 RX ORDER — DOCUSATE SODIUM 100 MG/1
100 CAPSULE, LIQUID FILLED ORAL 2 TIMES DAILY
Status: DISCONTINUED | OUTPATIENT
Start: 2019-01-28 | End: 2019-01-30 | Stop reason: HOSPADM

## 2019-01-28 RX ORDER — HYDROCODONE BITARTRATE AND ACETAMINOPHEN 5; 325 MG/1; MG/1
1 TABLET ORAL EVERY 8 HOURS PRN
Qty: 15 TABLET | Refills: 0 | Status: SHIPPED | OUTPATIENT
Start: 2019-01-28 | End: 2019-02-04

## 2019-01-28 RX ORDER — SODIUM CHLORIDE 0.9 % (FLUSH) 0.9 %
10 SYRINGE (ML) INJECTION EVERY 12 HOURS SCHEDULED
Status: DISCONTINUED | OUTPATIENT
Start: 2019-01-28 | End: 2019-01-28

## 2019-01-28 RX ORDER — LIDOCAINE HYDROCHLORIDE AND EPINEPHRINE 20; 5 MG/ML; UG/ML
INJECTION, SOLUTION EPIDURAL; INFILTRATION; INTRACAUDAL; PERINEURAL PRN
Status: DISCONTINUED | OUTPATIENT
Start: 2019-01-28 | End: 2019-01-28 | Stop reason: SDUPTHER

## 2019-01-28 RX ORDER — IBUPROFEN 600 MG/1
600 TABLET ORAL EVERY 6 HOURS PRN
Qty: 30 TABLET | Refills: 1 | Status: ON HOLD | OUTPATIENT
Start: 2019-01-28 | End: 2021-10-21 | Stop reason: HOSPADM

## 2019-01-28 RX ORDER — BUPIVACAINE HYDROCHLORIDE 2.5 MG/ML
INJECTION, SOLUTION EPIDURAL; INFILTRATION; INTRACAUDAL PRN
Status: DISCONTINUED | OUTPATIENT
Start: 2019-01-28 | End: 2019-01-28 | Stop reason: SDUPTHER

## 2019-01-28 RX ORDER — IBUPROFEN 800 MG/1
800 TABLET ORAL EVERY 8 HOURS
Status: DISCONTINUED | OUTPATIENT
Start: 2019-01-28 | End: 2019-01-30 | Stop reason: HOSPADM

## 2019-01-28 RX ORDER — SODIUM CHLORIDE 0.9 % (FLUSH) 0.9 %
10 SYRINGE (ML) INJECTION PRN
Status: DISCONTINUED | OUTPATIENT
Start: 2019-01-28 | End: 2019-01-30 | Stop reason: HOSPADM

## 2019-01-28 RX ORDER — SODIUM CHLORIDE 0.9 % (FLUSH) 0.9 %
10 SYRINGE (ML) INJECTION PRN
Status: DISCONTINUED | OUTPATIENT
Start: 2019-01-28 | End: 2019-01-28

## 2019-01-28 RX ORDER — SODIUM CHLORIDE 0.9 % (FLUSH) 0.9 %
10 SYRINGE (ML) INJECTION EVERY 12 HOURS SCHEDULED
Status: DISCONTINUED | OUTPATIENT
Start: 2019-01-28 | End: 2019-01-30 | Stop reason: HOSPADM

## 2019-01-28 RX ORDER — HYDROCODONE BITARTRATE AND ACETAMINOPHEN 5; 325 MG/1; MG/1
2 TABLET ORAL EVERY 4 HOURS PRN
Status: DISCONTINUED | OUTPATIENT
Start: 2019-01-28 | End: 2019-01-30 | Stop reason: HOSPADM

## 2019-01-28 RX ORDER — HYDROCODONE BITARTRATE AND ACETAMINOPHEN 5; 325 MG/1; MG/1
1 TABLET ORAL EVERY 4 HOURS PRN
Status: DISCONTINUED | OUTPATIENT
Start: 2019-01-28 | End: 2019-01-30 | Stop reason: HOSPADM

## 2019-01-28 RX ORDER — ACETAMINOPHEN 325 MG/1
650 TABLET ORAL EVERY 4 HOURS PRN
Status: DISCONTINUED | OUTPATIENT
Start: 2019-01-28 | End: 2019-01-30 | Stop reason: HOSPADM

## 2019-01-28 RX ORDER — DOCUSATE SODIUM 100 MG/1
100 CAPSULE, LIQUID FILLED ORAL 2 TIMES DAILY
Status: DISCONTINUED | OUTPATIENT
Start: 2019-01-28 | End: 2019-01-28

## 2019-01-28 RX ORDER — ONDANSETRON 2 MG/ML
4 INJECTION INTRAMUSCULAR; INTRAVENOUS EVERY 6 HOURS PRN
Status: DISCONTINUED | OUTPATIENT
Start: 2019-01-28 | End: 2019-01-28

## 2019-01-28 RX ORDER — LANOLIN 100 %
OINTMENT (GRAM) TOPICAL PRN
Status: DISCONTINUED | OUTPATIENT
Start: 2019-01-28 | End: 2019-01-30 | Stop reason: HOSPADM

## 2019-01-28 RX ORDER — SODIUM CHLORIDE, SODIUM LACTATE, POTASSIUM CHLORIDE, CALCIUM CHLORIDE 600; 310; 30; 20 MG/100ML; MG/100ML; MG/100ML; MG/100ML
INJECTION, SOLUTION INTRAVENOUS
Status: COMPLETED
Start: 2019-01-28 | End: 2019-01-28

## 2019-01-28 RX ORDER — SODIUM CHLORIDE, SODIUM LACTATE, POTASSIUM CHLORIDE, CALCIUM CHLORIDE 600; 310; 30; 20 MG/100ML; MG/100ML; MG/100ML; MG/100ML
INJECTION, SOLUTION INTRAVENOUS CONTINUOUS
Status: DISCONTINUED | OUTPATIENT
Start: 2019-01-28 | End: 2019-01-28

## 2019-01-28 RX ADMIN — IBUPROFEN 800 MG: 800 TABLET, FILM COATED ORAL at 22:32

## 2019-01-28 RX ADMIN — Medication 12 ML/HR: at 13:57

## 2019-01-28 RX ADMIN — SODIUM CHLORIDE, POTASSIUM CHLORIDE, SODIUM LACTATE AND CALCIUM CHLORIDE: 600; 310; 30; 20 INJECTION, SOLUTION INTRAVENOUS at 13:34

## 2019-01-28 RX ADMIN — Medication 10 ML: at 22:34

## 2019-01-28 RX ADMIN — Medication 1 MILLI-UNITS/MIN: at 14:50

## 2019-01-28 RX ADMIN — DEXTROSE MONOHYDRATE 2.5 MILLION UNITS: 50 INJECTION, SOLUTION INTRAVENOUS at 15:19

## 2019-01-28 RX ADMIN — ONDANSETRON 4 MG: 2 INJECTION INTRAMUSCULAR; INTRAVENOUS at 13:47

## 2019-01-28 RX ADMIN — SODIUM CHLORIDE, POTASSIUM CHLORIDE, SODIUM LACTATE AND CALCIUM CHLORIDE: 600; 310; 30; 20 INJECTION, SOLUTION INTRAVENOUS at 13:28

## 2019-01-28 RX ADMIN — SODIUM CHLORIDE, POTASSIUM CHLORIDE, SODIUM LACTATE AND CALCIUM CHLORIDE 1000 ML: 600; 310; 30; 20 INJECTION, SOLUTION INTRAVENOUS at 11:09

## 2019-01-28 RX ADMIN — DOCUSATE SODIUM 100 MG: 100 CAPSULE, LIQUID FILLED ORAL at 22:33

## 2019-01-28 RX ADMIN — DEXTROSE MONOHYDRATE 5 MILLION UNITS: 5 INJECTION INTRAVENOUS at 11:10

## 2019-01-28 RX ADMIN — LIDOCAINE HYDROCHLORIDE AND EPINEPHRINE 3 ML: 20; 5 INJECTION, SOLUTION EPIDURAL; INFILTRATION; INTRACAUDAL; PERINEURAL at 13:54

## 2019-01-28 RX ADMIN — SODIUM CHLORIDE, POTASSIUM CHLORIDE, SODIUM LACTATE AND CALCIUM CHLORIDE: 600; 310; 30; 20 INJECTION, SOLUTION INTRAVENOUS at 11:43

## 2019-01-28 RX ADMIN — BUPIVACAINE HYDROCHLORIDE 5 ML: 2.5 INJECTION, SOLUTION EPIDURAL; INFILTRATION; INTRACAUDAL; PERINEURAL at 13:57

## 2019-01-28 ASSESSMENT — PAIN SCALES - GENERAL: PAINLEVEL_OUTOF10: 2

## 2019-01-29 LAB — TOTAL SYPHILLIS IGG/IGM: NORMAL

## 2019-01-29 PROCEDURE — 6370000000 HC RX 637 (ALT 250 FOR IP): Performed by: OBSTETRICS & GYNECOLOGY

## 2019-01-29 PROCEDURE — 2580000003 HC RX 258: Performed by: OBSTETRICS & GYNECOLOGY

## 2019-01-29 PROCEDURE — 1220000000 HC SEMI PRIVATE OB R&B

## 2019-01-29 RX ADMIN — ACETAMINOPHEN 650 MG: 325 TABLET, FILM COATED ORAL at 11:19

## 2019-01-29 RX ADMIN — IBUPROFEN 800 MG: 800 TABLET, FILM COATED ORAL at 16:37

## 2019-01-29 RX ADMIN — DOCUSATE SODIUM 100 MG: 100 CAPSULE, LIQUID FILLED ORAL at 08:57

## 2019-01-29 RX ADMIN — DOCUSATE SODIUM 100 MG: 100 CAPSULE, LIQUID FILLED ORAL at 21:12

## 2019-01-29 RX ADMIN — Medication 10 ML: at 09:04

## 2019-01-29 RX ADMIN — IBUPROFEN 800 MG: 800 TABLET, FILM COATED ORAL at 06:32

## 2019-01-29 RX ADMIN — ACETAMINOPHEN 650 MG: 325 TABLET, FILM COATED ORAL at 02:51

## 2019-01-29 ASSESSMENT — PAIN SCALES - GENERAL
PAINLEVEL_OUTOF10: 2

## 2019-01-30 VITALS
BODY MASS INDEX: 28.7 KG/M2 | RESPIRATION RATE: 18 BRPM | WEIGHT: 152 LBS | OXYGEN SATURATION: 95 % | TEMPERATURE: 98.2 F | DIASTOLIC BLOOD PRESSURE: 76 MMHG | HEART RATE: 86 BPM | HEIGHT: 61 IN | SYSTOLIC BLOOD PRESSURE: 120 MMHG

## 2019-01-30 PROCEDURE — 6370000000 HC RX 637 (ALT 250 FOR IP): Performed by: OBSTETRICS & GYNECOLOGY

## 2019-01-30 RX ADMIN — DOCUSATE SODIUM 100 MG: 100 CAPSULE, LIQUID FILLED ORAL at 10:05

## 2019-01-30 RX ADMIN — IBUPROFEN 800 MG: 800 TABLET, FILM COATED ORAL at 10:05

## 2019-01-30 RX ADMIN — IBUPROFEN 800 MG: 800 TABLET, FILM COATED ORAL at 00:33

## 2019-01-30 ASSESSMENT — PAIN SCALES - GENERAL
PAINLEVEL_OUTOF10: 2
PAINLEVEL_OUTOF10: 2

## 2019-02-01 DIAGNOSIS — F33.1 MODERATE EPISODE OF RECURRENT MAJOR DEPRESSIVE DISORDER (HCC): ICD-10-CM

## 2019-02-01 RX ORDER — ARIPIPRAZOLE 5 MG/1
5 TABLET ORAL NIGHTLY
Qty: 30 TABLET | Refills: 3 | Status: SHIPPED | OUTPATIENT
Start: 2019-02-01 | End: 2019-05-06 | Stop reason: SDUPTHER

## 2019-02-01 RX ORDER — FLUOXETINE HYDROCHLORIDE 20 MG/1
20 CAPSULE ORAL DAILY
Qty: 30 CAPSULE | Refills: 3 | Status: SHIPPED | OUTPATIENT
Start: 2019-02-01 | End: 2019-05-06 | Stop reason: SDUPTHER

## 2019-02-05 ENCOUNTER — OFFICE VISIT (OUTPATIENT)
Dept: FAMILY MEDICINE CLINIC | Age: 29
End: 2019-02-05
Payer: MEDICARE

## 2019-02-05 VITALS
RESPIRATION RATE: 16 BRPM | TEMPERATURE: 98.4 F | SYSTOLIC BLOOD PRESSURE: 122 MMHG | HEART RATE: 100 BPM | DIASTOLIC BLOOD PRESSURE: 78 MMHG | BODY MASS INDEX: 26.81 KG/M2 | WEIGHT: 142 LBS | HEIGHT: 61 IN

## 2019-02-05 DIAGNOSIS — F33.1 MODERATE EPISODE OF RECURRENT MAJOR DEPRESSIVE DISORDER (HCC): ICD-10-CM

## 2019-02-05 DIAGNOSIS — F41.9 ANXIETY: Primary | ICD-10-CM

## 2019-02-05 PROBLEM — Z37.9 NORMAL LABOR: Status: RESOLVED | Noted: 2019-01-28 | Resolved: 2019-02-05

## 2019-02-05 PROCEDURE — G8419 CALC BMI OUT NRM PARAM NOF/U: HCPCS | Performed by: FAMILY MEDICINE

## 2019-02-05 PROCEDURE — G8427 DOCREV CUR MEDS BY ELIG CLIN: HCPCS | Performed by: FAMILY MEDICINE

## 2019-02-05 PROCEDURE — G8484 FLU IMMUNIZE NO ADMIN: HCPCS | Performed by: FAMILY MEDICINE

## 2019-02-05 PROCEDURE — 99213 OFFICE O/P EST LOW 20 MIN: CPT | Performed by: FAMILY MEDICINE

## 2019-02-05 PROCEDURE — 96160 PT-FOCUSED HLTH RISK ASSMT: CPT | Performed by: FAMILY MEDICINE

## 2019-02-05 PROCEDURE — 1036F TOBACCO NON-USER: CPT | Performed by: FAMILY MEDICINE

## 2019-02-05 ASSESSMENT — PATIENT HEALTH QUESTIONNAIRE - PHQ9
1. LITTLE INTEREST OR PLEASURE IN DOING THINGS: 1
10. IF YOU CHECKED OFF ANY PROBLEMS, HOW DIFFICULT HAVE THESE PROBLEMS MADE IT FOR YOU TO DO YOUR WORK, TAKE CARE OF THINGS AT HOME, OR GET ALONG WITH OTHER PEOPLE: 1
SUM OF ALL RESPONSES TO PHQ QUESTIONS 1-9: 12
5. POOR APPETITE OR OVEREATING: 1
6. FEELING BAD ABOUT YOURSELF - OR THAT YOU ARE A FAILURE OR HAVE LET YOURSELF OR YOUR FAMILY DOWN: 1
7. TROUBLE CONCENTRATING ON THINGS, SUCH AS READING THE NEWSPAPER OR WATCHING TELEVISION: 1
SUM OF ALL RESPONSES TO PHQ9 QUESTIONS 1 & 2: 3
SUM OF ALL RESPONSES TO PHQ QUESTIONS 1-9: 12
8. MOVING OR SPEAKING SO SLOWLY THAT OTHER PEOPLE COULD HAVE NOTICED. OR THE OPPOSITE, BEING SO FIGETY OR RESTLESS THAT YOU HAVE BEEN MOVING AROUND A LOT MORE THAN USUAL: 2
4. FEELING TIRED OR HAVING LITTLE ENERGY: 2
2. FEELING DOWN, DEPRESSED OR HOPELESS: 2
9. THOUGHTS THAT YOU WOULD BE BETTER OFF DEAD, OR OF HURTING YOURSELF: 0
3. TROUBLE FALLING OR STAYING ASLEEP: 2

## 2019-05-02 LAB
ABO GROUPING: NORMAL
ANTIBODY SCREEN: NEGATIVE
BASOPHILS ABSOLUTE: 38 /UL (ref 0–200)
BASOPHILS RELATIVE PERCENT: 0.5 % (ref 0–1)
EOSINOPHILS ABSOLUTE: 90 /UL (ref 15–500)
EOSINOPHILS RELATIVE PERCENT: 1.2 % (ref 0–8)
HCT VFR BLD CALC: 37 % (ref 35–45)
HEMOGLOBIN: 12.1 G/DL (ref 11.7–15.5)
LYMPHOCYTES ABSOLUTE: 2190 /UL (ref 850–3900)
LYMPHOCYTES RELATIVE PERCENT: 29.2 % (ref 15–45)
MCH RBC QN AUTO: 26.5 PG (ref 27–33)
MCHC RBC AUTO-ENTMCNC: 32.7 G/DL (ref 32–36)
MCV RBC AUTO: 81.3 FL (ref 80–100)
MONOCYTES ABSOLUTE: 593 /UL (ref 200–950)
MONOCYTES RELATIVE PERCENT: 7.9 % (ref 0–12)
NEUTROPHILS ABSOLUTE: 4590 /UL (ref 1500–7800)
PDW BLD-RTO: 16.3 % (ref 11–15)
PLATELET # BLD: 396 10E3/UL (ref 140–400)
PMV BLD AUTO: 8.5 FL (ref 7.5–11.5)
RBC # BLD: 4.55 10E6/UL (ref 3.8–5.1)
RH FACTOR: POSITIVE
SEGMENTED NEUTROPHILS RELATIVE PERCENT: 61.2 % (ref 40–80)
WBC # BLD: 7.5 10E3/UL (ref 3.8–10.8)

## 2019-05-06 ENCOUNTER — OFFICE VISIT (OUTPATIENT)
Dept: FAMILY MEDICINE CLINIC | Age: 29
End: 2019-05-06
Payer: COMMERCIAL

## 2019-05-06 VITALS
HEIGHT: 61 IN | WEIGHT: 158 LBS | HEART RATE: 94 BPM | DIASTOLIC BLOOD PRESSURE: 70 MMHG | TEMPERATURE: 98.3 F | RESPIRATION RATE: 16 BRPM | BODY MASS INDEX: 29.83 KG/M2 | SYSTOLIC BLOOD PRESSURE: 115 MMHG

## 2019-05-06 DIAGNOSIS — F33.1 MODERATE EPISODE OF RECURRENT MAJOR DEPRESSIVE DISORDER (HCC): Primary | ICD-10-CM

## 2019-05-06 DIAGNOSIS — F41.9 ANXIETY: ICD-10-CM

## 2019-05-06 PROCEDURE — 99213 OFFICE O/P EST LOW 20 MIN: CPT | Performed by: FAMILY MEDICINE

## 2019-05-06 RX ORDER — FLUOXETINE HYDROCHLORIDE 40 MG/1
40 CAPSULE ORAL DAILY
Qty: 30 CAPSULE | Refills: 3 | Status: SHIPPED | OUTPATIENT
Start: 2019-05-06 | End: 2019-08-06 | Stop reason: SDUPTHER

## 2019-05-06 RX ORDER — ARIPIPRAZOLE 5 MG/1
5 TABLET ORAL NIGHTLY
Qty: 30 TABLET | Refills: 3 | Status: SHIPPED | OUTPATIENT
Start: 2019-05-06 | End: 2019-08-06 | Stop reason: SDUPTHER

## 2019-05-06 NOTE — PROGRESS NOTES
Mood Visit  Subjective:     Chief Complaint   Patient presents with   Segun Srinivasan is a 30 y.o. female who presents for follow up of mood issue. Depression gone but still anxious  Sleep interrupted, latency on some nights instead of all of the time    HISTORY  Are you working with a psychologist / psychiatrist?  No  Have you felt your symptoms are better, worse, or unchanged since your last visit   mood is better but anxiety is unchanged   Mood is good. Sleep is fair to poor. Stressors: driving triggers anxiety, housework, her depression is better because she has motivation but her anxiety isn't doing well because then she gets overwhelmed with all she has to do. Current symptoms include: depressed mood, difficulty concentrating, fatigue, hopelessness, impaired memory and insomnia,difficulty concentrating, fatigue, feelings of losing control, irritable, racing thoughts. Patient denies depression symptoms of: depressed mood, feelings of worthlessness/guilt, hopelessness, suicidal thoughts without plan and suicidal intention  Patient denies anxiety symptoms of: chest pain, shortness of breath, sweating. Review of Systems   General ROS: fever? No,    Night sweats? No  Ophthalmic ROS:blurry vision or decreased vision? No  Endocrine ROS:lethargy? No   Unexpected weight changes? No  Respiratory ROS: cough? No   Shortness of breath? No  Cardiovascular ROS:chest pain? No   Shortness of breath with exertion? No  Gastrointestinal ROS: abdominal pain? No   Change in stools? No  * Documentation provided by medical assistant reviewed and updated by provider. HISTORY:  Patient's medications, allergies, past medical, and social histories were reviewed and updated as appropriate.      CHART REVIEW  Health Maintenance   Topic Date Due    Varicella Vaccine (1 of 2 - 13+ 2-dose series) 03/22/2004    DTaP/Tdap/Td vaccine (1 - Tdap) 03/22/2010    Flu vaccine (Season Ended) 09/01/2019    Cervical cancer screen  03/20/2022    HIV screen  Completed    Pneumococcal 0-64 years Vaccine  Aged Out     The ASCVD Risk score (Shorty Soto., et al., 2013) failed to calculate for the following reasons: The 2013 ASCVD risk score is only valid for ages 36 to 78  Prior to Visit Medications    Medication Sig Taking? Authorizing Provider   FLUoxetine (PROZAC) 40 MG capsule Take 1 capsule by mouth daily Yes Jennie Teran MD   ARIPiprazole (ABILIFY) 5 MG tablet Take 1 tablet by mouth nightly Yes Jennie Teran MD   ibuprofen (ADVIL;MOTRIN) 600 MG tablet Take 1 tablet by mouth every 6 hours as needed for Pain Yes Evelina Lamb MD   Fexofenadine-Pseudoephedrine (ALLEGRA-D 24 HOUR PO) Take 1 tablet by mouth once as needed Yes Historical Provider, MD      Family History   Problem Relation Age of Onset    Arthritis Mother     Depression Mother     Arthritis Father     Depression Father     Hearing Loss Sister     Learning Disabilities Brother     Mental Retardation Brother     Cancer Maternal Grandmother     Cancer Paternal Grandfather     Kidney Disease Paternal Grandfather      Social History     Tobacco Use    Smoking status: Former Smoker     Packs/day: 0.50     Types: Cigarettes    Smokeless tobacco: Never Used    Tobacco comment: did quit but started again   Substance Use Topics    Alcohol use:  Yes     Alcohol/week: 0.6 oz     Types: 1 Glasses of wine per week    Drug use: No      LAST LABS  No results found for: CHOL, LDLCALCNo results found for: HDLNo results found for: TRIG  Lab Results   Component Value Date    GLUCOSE 97 10/01/2018     Lab Results   Component Value Date     10/01/2018    K 3.9 10/01/2018    CREATININE <0.5 (L) 10/01/2018     Lab Results   Component Value Date    WBC 7.5 05/02/2019    HGB 12.1 05/02/2019    HCT 37.0 05/02/2019    MCV 81.3 05/02/2019     05/02/2019     Lab Results   Component Value Date    ALT 17 06/19/2017    AST 15 06/19/2017    ALKPHOS 80 06/19/2017 BILITOT 0.7 06/19/2017     TSH (uIU/mL)   Date Value   02/19/2015 1.43     No results found for: LABA1C  Objective:   PHYSICAL EXAM  /70 (Site: Left Upper Arm, Position: Sitting, Cuff Size: Large Adult)   Pulse 94   Temp 98.3 °F (36.8 °C) (Oral)   Resp 16   Ht 5' 1\" (1.549 m)   Wt 158 lb (71.7 kg)   BMI 29.85 kg/m²   BP Readings from Last 5 Encounters:   05/06/19 115/70   02/05/19 122/78   01/30/19 120/76   01/14/19 123/83   01/04/19 126/71     Wt Readings from Last 5 Encounters:   05/06/19 158 lb (71.7 kg)   02/05/19 142 lb (64.4 kg)   01/28/19 152 lb (68.9 kg)   01/04/19 153 lb (69.4 kg)   10/01/18 149 lb (67.6 kg)      GENERAL: well-developed, well-nourished, alert, no distress, calm  PSYCH:  full facial expressions, good grooming, good insight, normal perception, normal reasoning and normal speech pattern and content and normal thought patterns,     The time spent counseling patient was 10 minutes of 15 minute appointment. Reviewed concept of anxiety as biochemical imbalance of neurotransmitters and rationale for treatment. Instructed patient to contact office or on-call physician promptly should condition worsen or any new symptoms appear and provided on-call telephone numbers. IF THE PATIENT HAS ANY SUICIDAL OR HOMICIDAL IDEATIONS, CALL THE OFFICE, DISCUSS WITH A SUPPORT MEMBER, OR GO TO THE ER IMMEDIATELY. Patient was agreeable with this plan. Assessment and Plan:      Diagnosis Orders   1. Moderate episode of recurrent major depressive disorder (HCC)  FLUoxetine (PROZAC) 40 MG capsule    ARIPiprazole (ABILIFY) 5 MG tablet   2. Anxiety     improved. Plan as above and below. COUNSELLING  Discussed use, benefit, and side effects of prescribed medications. Barriers to medication compliance addressed. All patient questions answered. Pt voiced understanding. INSTRUCTIONS  · NEXT APPOINTMENT: Please schedule check-up in 3 months.     · PLEASE TAKE THIS FORM TO CHECK-OUT WINDOW TO SCHEDULE NEXT VISIT. · INCREASE fluoxetine to 40 mg. Use up 20 by taking 2 at a time. · Continue abilify.   ·

## 2019-05-06 NOTE — PATIENT INSTRUCTIONS
INSTRUCTIONS  · NEXT APPOINTMENT: Please schedule check-up in 3 months. · PLEASE TAKE THIS FORM TO CHECK-OUT WINDOW TO SCHEDULE NEXT VISIT. · INCREASE fluoxetine to 40 mg. Use up 20 by taking 2 at a time. · Continue abilify. Patient Education     STRESS: HOW TO BETTER COPE    What causes stress? Feelings of stress are caused by the body's instinct to defend itself. This instinct is good in emergencies, such as getting out of the way of a speeding car. But stress can cause unhealthy physical symptoms if it goes on for too long, such as in response to life's daily challenges and changes. When this happens, it's as though your body gets ready to jump out of the way of the car, but you're sitting still. Your body is working overtime, with no place to put all the extra energy. This can make you feel anxious, afraid, worried and uptight. What changes may be stressful? Any sort of change can make you feel stressed, even good change. It's not just the change or event itself, but also how you react to it that matters. What's stressful is different for each person. For example, one person may feel stressed by retiring from work, while someone else may not. Other things that may be stressful include being laid off from your job, your child leaving or returning home, the death of your spouse, divorce or marriage, an illness, an injury, a job promotion, money problems, moving, or having a baby. Can stress hurt my health? Stress can cause health problems or make health problems worse. Talk to your family doctor if you think some of your symptoms are caused by stress. It's important to make sure that your symptoms aren't caused by other health problems.     Possible signs of stress  Anxiety   Back pain   Constipation or diarrhea   Depression   Fatigue   Headaches   High blood pressure   Trouble sleeping or insomnia   Problems with relationships   Shortness of breath   Stiff neck or jaw   Upset stomach   Weight gain or loss     What can I do to manage my stress? The first step is to learn to recognize when you're feeling stressed. Early warning signs of stress include tension in your shoulders and neck, or clenching your hands into fists. The next step is to choose a way to deal with your stress. One way is to avoid the event or thing that leads to your stress--but often this is not possible. A second way is to change how you react to stress. This is often the more practical way. Tips for dealing with stress  Don't worry about things you can't control, such as the weather. Solve the little problems. This can help you gain a feeling of control. Prepare to the best of your ability for events you know may be stressful, such as a job interview. Try to look at change as a positive challenge, not as a threat. Work to resolve conflicts with other people. Talk with a trusted friend, family member or counselor. Set realistic goals at home and at work. Avoid overscheduling. Exercise on a regular basis. Eat regular, well-balanced meals and get enough sleep. Meditate. Participate in something you don't find stressful, such as sports, social events or hobbies. Why is exercise useful? Exercise is a good way to deal with stress because it's a healthy way to relieve your pent-up energy and tension. Exercise is known to release feel-good brain chemicals. It also helps you get in better shape, which makes you feel better overall. Steps to deep breathing  Lie down on a flat surface. Place a hand on your stomach, just above your navel. Place the other hand on your chest.   Breathe in slowly and try to make your stomach rise a little. Hold your breath for a second. Breathe out slowly and let your stomach go back down. What is meditation? Meditation is a form of guided thought. It can take many forms. You can do it with exercise that uses the same motions over and over, like walking or swimming.  You need some counseling to help you figure out what's making you so tense. Also, you may need to take some medicine to help you feel less anxious. Your doctor can recommend the treatment that is right for you. The following are some tips on coping with anxiety:    Control your worry. Choose a place and time to do your worrying. Make it the same place and time every day. Spend 30 minutes thinking about your concerns and what you can do about them. Try not to dwell on what \"might\" happen. Focus more on what's really happening. Then let go of the worry and go on with your day. Learn ways to relax. These may include muscle relaxation, yoga, or deep breathing. Steps to deep breathing  1. Lie down on a flat surface. 2. Place one hand on your stomach, just above your navel. Place the other hand on your chest.   3. Breathe in slowly and try to make your stomach rise a little. 4. Hold your breath for a second. 5. Breathe out slowly and let your stomach go back down. Muscle relaxation is simple. Start by choosing a muscle and holding it tight for a few seconds. Then relax the muscle. Do this with all of your muscles, one part of your body at a time. Try starting with your feet muscles and working your way up your body. Exercise regularly. People who have anxiety often quit exercising. But exercise can give you a sense of well being and help decrease feelings of anxiety. Get plenty of sleep. Sleep rests your brain as well as your body, and can improve your general sense of wellbeing as well as your mood. Avoid alcohol and drug abuse. It may seem that alcohol or drugs relax you. But in the long run they make anxiety worse and cause more problems. Avoid caffeine. Caffeine is found in coffee, tea, soft drinks and chocolate. Caffeine may increase your sense of anxiety because it stimulates your nervous system. Also avoid over-the-counter diet pills, and cough and cold medicines that contain a decongestant.     [employer]

## 2019-05-08 LAB — PREGNANCY, URINE: NEGATIVE

## 2019-05-29 ENCOUNTER — OFFICE VISIT (OUTPATIENT)
Dept: FAMILY MEDICINE CLINIC | Age: 29
End: 2019-05-29
Payer: MEDICARE

## 2019-05-29 VITALS
DIASTOLIC BLOOD PRESSURE: 76 MMHG | TEMPERATURE: 98 F | BODY MASS INDEX: 30.21 KG/M2 | WEIGHT: 160 LBS | SYSTOLIC BLOOD PRESSURE: 118 MMHG | HEIGHT: 61 IN | RESPIRATION RATE: 16 BRPM | HEART RATE: 77 BPM

## 2019-05-29 DIAGNOSIS — J02.9 ACUTE PHARYNGITIS, UNSPECIFIED ETIOLOGY: Primary | ICD-10-CM

## 2019-05-29 LAB — S PYO AG THROAT QL: NORMAL

## 2019-05-29 PROCEDURE — G8427 DOCREV CUR MEDS BY ELIG CLIN: HCPCS | Performed by: FAMILY MEDICINE

## 2019-05-29 PROCEDURE — 1036F TOBACCO NON-USER: CPT | Performed by: FAMILY MEDICINE

## 2019-05-29 PROCEDURE — 87880 STREP A ASSAY W/OPTIC: CPT | Performed by: FAMILY MEDICINE

## 2019-05-29 PROCEDURE — 99213 OFFICE O/P EST LOW 20 MIN: CPT | Performed by: FAMILY MEDICINE

## 2019-05-29 PROCEDURE — G8417 CALC BMI ABV UP PARAM F/U: HCPCS | Performed by: FAMILY MEDICINE

## 2019-05-29 NOTE — PROGRESS NOTES
PROBLEM VISIT NOTE     Subjective:     Chief Complaint   Patient presents with    Pharyngitis     Quiana Murphy is a 30 y.o. female   who presents sore throat, fatigue   Duration x 6 days   Associated with slight fever at first. Just a little cough and ciongestion. Tried took sudafed   Has strep exposure thru extended family    Patient's medications, allergies, past medical, and social histories were reviewed and updated as appropriate (see below). Review of Systems   General ROS: fever? No,    Night sweats? No  Endocrine ROS:malaise/lethargy? yes   Unexpected weight changes? No  Respiratory ROS: cough? Yes   Shortness of breath? No  Cardiovascular ROS:chest pain? No   Shortness of breath with exertion? No  Gastrointestinal ROS: abdominal pain? No   Change in stools? No  Genito-Urinary ROS: painful urination? No   Trouble voiding? No  Neurological ROS: TIA or stroke symptoms? No   Numbness/tingling in feet? No    * Documentation provided by medical assistant reviewed and updated by provider. HISTORY:  Patient's medications, allergies, past medical, and social histories were reviewed and updated as appropriate. CHART REVIEW  Health Maintenance   Topic Date Due    DTaP/Tdap/Td vaccine (1 - Tdap) 03/22/2010    Flu vaccine (Season Ended) 09/01/2019    Cervical cancer screen  03/20/2022    HIV screen  Completed    Pneumococcal 0-64 years Vaccine  Aged Out     The ASCVD Risk score (Billie Yudith., et al., 2013) failed to calculate for the following reasons: The 2013 ASCVD risk score is only valid for ages 36 to 78  Prior to Visit Medications    Medication Sig Taking?  Authorizing Provider   FLUoxetine (PROZAC) 40 MG capsule Take 1 capsule by mouth daily Yes Lita Sen MD   ARIPiprazole (ABILIFY) 5 MG tablet Take 1 tablet by mouth nightly Yes Lita Sen MD   ibuprofen (ADVIL;MOTRIN) 600 MG tablet Take 1 tablet by mouth every 6 hours as needed for Pain Yes Shreya Casiano MD (69.4 kg)      GENERAL:   · well-developed, well-nourished, alert, no distress. EYES:   · External findings: lids and lashes normal and conjunctivae and sclerae normal  · Eyes: no periorbital cellulitis. ENT:   · External nose and ears appear normal  · normal TM's and external ear canals both ears  · Pharynx: Post nasal drip noted. . Exudates: None  · Lips, mucosa, and tongue normal   · Hearing grossly normal.     NECK:   · No adenopathy, supple, symmetrical, trachea midline  · Thyroid not enlarged, symmetric, no tenderness/mass/nodules  LUNGS:    · Breathing unlabored  · clear to auscultation bilaterally and good air movement  CARDIOVASC:   · regular rate and rhythm, S1, S2 normal. No murmurs noted. Assessment and Plan:      Diagnosis Orders   1. Acute pharyngitis, unspecified etiology  Throat culture    POCT rapid strep A   Plan below. Suspect viral or allergies. R/O strep  INSTRUCTIONS  · Will call with culture results. · For allergies, patient may take OTC antihistamines such as Claritin/Allovert/loratidine or Zyrtec/certrizine or Allegra/fexofenadine. If allergies severe, may also take OTC Benadryl/diphenhydramine. May take Mucinex (guaifenisen) and Robitussin DM (guafenisen, dextromethorphan) for cough and congestion. May also try Vicks Vaporub. · AVOID decongestants like phenylephrine and pseudoephedrine. AVOID Afrin. · May take ibuprofen (Motrin, Advil) 200 mg tabs up to 4 tabs every 8 hours. May also take acetaminophen (Tylenol) as instructed on packaging.

## 2019-05-31 LAB — THROAT CULTURE: NORMAL

## 2019-08-06 ENCOUNTER — OFFICE VISIT (OUTPATIENT)
Dept: FAMILY MEDICINE CLINIC | Age: 29
End: 2019-08-06
Payer: MEDICARE

## 2019-08-06 VITALS
SYSTOLIC BLOOD PRESSURE: 120 MMHG | DIASTOLIC BLOOD PRESSURE: 82 MMHG | HEIGHT: 61 IN | WEIGHT: 165 LBS | BODY MASS INDEX: 31.15 KG/M2 | HEART RATE: 93 BPM

## 2019-08-06 DIAGNOSIS — M54.50 CHRONIC BILATERAL LOW BACK PAIN WITHOUT SCIATICA: Primary | ICD-10-CM

## 2019-08-06 DIAGNOSIS — F33.1 MODERATE EPISODE OF RECURRENT MAJOR DEPRESSIVE DISORDER (HCC): ICD-10-CM

## 2019-08-06 DIAGNOSIS — G89.29 CHRONIC BILATERAL LOW BACK PAIN WITHOUT SCIATICA: Primary | ICD-10-CM

## 2019-08-06 PROCEDURE — 1036F TOBACCO NON-USER: CPT | Performed by: FAMILY MEDICINE

## 2019-08-06 PROCEDURE — G8427 DOCREV CUR MEDS BY ELIG CLIN: HCPCS | Performed by: FAMILY MEDICINE

## 2019-08-06 PROCEDURE — 99213 OFFICE O/P EST LOW 20 MIN: CPT | Performed by: FAMILY MEDICINE

## 2019-08-06 PROCEDURE — G8417 CALC BMI ABV UP PARAM F/U: HCPCS | Performed by: FAMILY MEDICINE

## 2019-08-06 RX ORDER — FLUOXETINE HYDROCHLORIDE 40 MG/1
40 CAPSULE ORAL DAILY
Qty: 30 CAPSULE | Refills: 5 | Status: SHIPPED | OUTPATIENT
Start: 2019-08-06 | End: 2020-02-13 | Stop reason: SDUPTHER

## 2019-08-06 RX ORDER — ARIPIPRAZOLE 5 MG/1
5 TABLET ORAL NIGHTLY
Qty: 30 TABLET | Refills: 5 | Status: SHIPPED | OUTPATIENT
Start: 2019-08-06 | End: 2020-02-13 | Stop reason: SDUPTHER

## 2019-08-06 NOTE — PROGRESS NOTES
Mood Visit  Subjective:     Chief Complaint   Patient presents with    Other     has not had medication for awhile.  Back Pain     lower back. for few yrs. since [facility] has gotten worse. tried ibuprofen helps alittle bit      Naima Mari is a 30 y.o. female who presents for follow up of mood issue. More anxious, no motivation, feeling tired since off meds 2-3 mos. Other issues: low back pain worse past 7 months since delivery, worse to bend, stand all day  Better with ibu, rest  Denies radiation to legs    HISTORY:  Patient's medications, allergies, past medical, and social histories were reviewed and updated as appropriate. CHART REVIEW  Health Maintenance   Topic Date Due    DTaP/Tdap/Td vaccine (1 - Tdap) 03/22/2010    Flu vaccine (1) 09/01/2019    Cervical cancer screen  03/20/2022    HIV screen  Completed    Pneumococcal 0-64 years Vaccine  Aged Out     The ASCVD Risk score (Dayana Canas, et al., 2013) failed to calculate for the following reasons: The 2013 ASCVD risk score is only valid for ages 36 to 78  Prior to Visit Medications    Medication Sig Taking?  Authorizing Provider   ARIPiprazole (ABILIFY) 5 MG tablet Take 1 tablet by mouth nightly Yes Kirk Campos MD   FLUoxetine (PROZAC) 40 MG capsule Take 1 capsule by mouth daily Yes Kirk Campos MD   ibuprofen (ADVIL;MOTRIN) 600 MG tablet Take 1 tablet by mouth every 6 hours as needed for Pain Yes Lyssa Puga MD   Fexofenadine-Pseudoephedrine (ALLEGRA-D 24 HOUR PO) Take 1 tablet by mouth once as needed Yes Historical Provider, MD      Family History   Problem Relation Age of Onset    Arthritis Mother     Depression Mother     Arthritis Father     Depression Father     Hearing Loss Sister     Learning Disabilities Brother     Mental Retardation Brother     Cancer Maternal Grandmother     Cancer Paternal Grandfather     Kidney Disease Paternal Grandfather      Social History     Tobacco Use    Smoking status: Former

## 2019-08-27 ENCOUNTER — OFFICE VISIT (OUTPATIENT)
Dept: FAMILY MEDICINE CLINIC | Age: 29
End: 2019-08-27
Payer: MEDICARE

## 2019-08-27 VITALS
DIASTOLIC BLOOD PRESSURE: 70 MMHG | RESPIRATION RATE: 18 BRPM | HEART RATE: 78 BPM | SYSTOLIC BLOOD PRESSURE: 110 MMHG | BODY MASS INDEX: 31.18 KG/M2 | WEIGHT: 165 LBS

## 2019-08-27 DIAGNOSIS — R20.2 NUMBNESS AND TINGLING IN LEFT HAND: Primary | ICD-10-CM

## 2019-08-27 DIAGNOSIS — R20.0 NUMBNESS AND TINGLING IN LEFT HAND: Primary | ICD-10-CM

## 2019-08-27 PROCEDURE — G8417 CALC BMI ABV UP PARAM F/U: HCPCS | Performed by: NURSE PRACTITIONER

## 2019-08-27 PROCEDURE — G8427 DOCREV CUR MEDS BY ELIG CLIN: HCPCS | Performed by: NURSE PRACTITIONER

## 2019-08-27 PROCEDURE — 99213 OFFICE O/P EST LOW 20 MIN: CPT | Performed by: NURSE PRACTITIONER

## 2019-08-27 PROCEDURE — 1036F TOBACCO NON-USER: CPT | Performed by: NURSE PRACTITIONER

## 2019-08-27 ASSESSMENT — ENCOUNTER SYMPTOMS
BACK PAIN: 0
SHORTNESS OF BREATH: 0
NAUSEA: 0
CHEST TIGHTNESS: 0
ABDOMINAL PAIN: 0
COUGH: 0
DIARRHEA: 0
CONSTIPATION: 0
VOMITING: 0

## 2019-09-27 ENCOUNTER — OFFICE VISIT (OUTPATIENT)
Dept: FAMILY MEDICINE CLINIC | Age: 29
End: 2019-09-27
Payer: MEDICARE

## 2019-09-27 VITALS
BODY MASS INDEX: 31.93 KG/M2 | DIASTOLIC BLOOD PRESSURE: 72 MMHG | TEMPERATURE: 98.2 F | RESPIRATION RATE: 18 BRPM | SYSTOLIC BLOOD PRESSURE: 110 MMHG | HEART RATE: 72 BPM | WEIGHT: 169 LBS

## 2019-09-27 DIAGNOSIS — K52.9 ACUTE GASTROENTERITIS: Primary | ICD-10-CM

## 2019-09-27 PROCEDURE — 99213 OFFICE O/P EST LOW 20 MIN: CPT | Performed by: NURSE PRACTITIONER

## 2019-09-27 PROCEDURE — G8417 CALC BMI ABV UP PARAM F/U: HCPCS | Performed by: NURSE PRACTITIONER

## 2019-09-27 PROCEDURE — 1036F TOBACCO NON-USER: CPT | Performed by: NURSE PRACTITIONER

## 2019-09-27 PROCEDURE — G8427 DOCREV CUR MEDS BY ELIG CLIN: HCPCS | Performed by: NURSE PRACTITIONER

## 2019-09-27 RX ORDER — ONDANSETRON 4 MG/1
4 TABLET, FILM COATED ORAL EVERY 8 HOURS PRN
Qty: 20 TABLET | Refills: 0 | Status: SHIPPED | OUTPATIENT
Start: 2019-09-27 | End: 2020-09-18

## 2019-09-27 ASSESSMENT — ENCOUNTER SYMPTOMS
VOMITING: 1
DIARRHEA: 1
SHORTNESS OF BREATH: 0
COUGH: 0
ABDOMINAL PAIN: 1
CONSTIPATION: 0
BLOOD IN STOOL: 0
NAUSEA: 1

## 2019-11-08 ENCOUNTER — OFFICE VISIT (OUTPATIENT)
Dept: FAMILY MEDICINE CLINIC | Age: 29
End: 2019-11-08
Payer: MEDICARE

## 2019-11-08 VITALS
DIASTOLIC BLOOD PRESSURE: 74 MMHG | HEIGHT: 61 IN | HEART RATE: 95 BPM | SYSTOLIC BLOOD PRESSURE: 118 MMHG | RESPIRATION RATE: 14 BRPM | OXYGEN SATURATION: 98 % | WEIGHT: 168.2 LBS | BODY MASS INDEX: 31.75 KG/M2

## 2019-11-08 DIAGNOSIS — J02.9 SORE THROAT: Primary | ICD-10-CM

## 2019-11-08 DIAGNOSIS — R68.89 FLU-LIKE SYMPTOMS: ICD-10-CM

## 2019-11-08 LAB
INFLUENZA A ANTIBODY: NORMAL
INFLUENZA B ANTIBODY: NORMAL
S PYO AG THROAT QL: NORMAL

## 2019-11-08 PROCEDURE — 87804 INFLUENZA ASSAY W/OPTIC: CPT | Performed by: INTERNAL MEDICINE

## 2019-11-08 PROCEDURE — G8427 DOCREV CUR MEDS BY ELIG CLIN: HCPCS | Performed by: INTERNAL MEDICINE

## 2019-11-08 PROCEDURE — 1036F TOBACCO NON-USER: CPT | Performed by: INTERNAL MEDICINE

## 2019-11-08 PROCEDURE — 99213 OFFICE O/P EST LOW 20 MIN: CPT | Performed by: INTERNAL MEDICINE

## 2019-11-08 PROCEDURE — 87880 STREP A ASSAY W/OPTIC: CPT | Performed by: INTERNAL MEDICINE

## 2019-11-08 PROCEDURE — G8417 CALC BMI ABV UP PARAM F/U: HCPCS | Performed by: INTERNAL MEDICINE

## 2019-11-08 PROCEDURE — G8484 FLU IMMUNIZE NO ADMIN: HCPCS | Performed by: INTERNAL MEDICINE

## 2019-11-08 ASSESSMENT — ENCOUNTER SYMPTOMS
SHORTNESS OF BREATH: 0
COUGH: 1
VOMITING: 0
NAUSEA: 0

## 2019-11-10 LAB
ORGANISM: ABNORMAL
THROAT CULTURE: ABNORMAL
THROAT CULTURE: ABNORMAL

## 2019-11-11 RX ORDER — AMOXICILLIN AND CLAVULANATE POTASSIUM 875; 125 MG/1; MG/1
1 TABLET, FILM COATED ORAL 2 TIMES DAILY
Qty: 20 TABLET | Refills: 0 | Status: SHIPPED | OUTPATIENT
Start: 2019-11-11 | End: 2019-11-21

## 2020-02-13 RX ORDER — FLUOXETINE HYDROCHLORIDE 40 MG/1
40 CAPSULE ORAL DAILY
Qty: 30 CAPSULE | Refills: 5 | Status: SHIPPED | OUTPATIENT
Start: 2020-02-13 | End: 2020-09-18 | Stop reason: SDUPTHER

## 2020-02-13 RX ORDER — ARIPIPRAZOLE 5 MG/1
5 TABLET ORAL NIGHTLY
Qty: 30 TABLET | Refills: 5 | Status: SHIPPED | OUTPATIENT
Start: 2020-02-13 | End: 2020-09-18 | Stop reason: SDUPTHER

## 2020-03-13 ENCOUNTER — OFFICE VISIT (OUTPATIENT)
Dept: FAMILY MEDICINE CLINIC | Age: 30
End: 2020-03-13
Payer: COMMERCIAL

## 2020-03-13 ENCOUNTER — HOSPITAL ENCOUNTER (OUTPATIENT)
Dept: GENERAL RADIOLOGY | Age: 30
Discharge: HOME | End: 2020-03-13
Payer: COMMERCIAL

## 2020-03-13 ENCOUNTER — HOSPITAL ENCOUNTER (OUTPATIENT)
Age: 30
Discharge: HOME | End: 2020-03-13
Payer: COMMERCIAL

## 2020-03-13 VITALS
OXYGEN SATURATION: 98 % | HEIGHT: 61 IN | SYSTOLIC BLOOD PRESSURE: 118 MMHG | BODY MASS INDEX: 31.53 KG/M2 | RESPIRATION RATE: 16 BRPM | WEIGHT: 167 LBS | HEART RATE: 86 BPM | DIASTOLIC BLOOD PRESSURE: 78 MMHG

## 2020-03-13 DIAGNOSIS — L65.9 ALOPECIA: ICD-10-CM

## 2020-03-13 DIAGNOSIS — Z00.00 WELL ADULT HEALTH CHECK: ICD-10-CM

## 2020-03-13 PROBLEM — F33.1 MODERATE EPISODE OF RECURRENT MAJOR DEPRESSIVE DISORDER (HCC): Status: RESOLVED | Noted: 2019-02-05 | Resolved: 2020-03-13

## 2020-03-13 LAB
CHOLESTEROL, TOTAL: 144 MG/DL (ref 0–199)
GLUCOSE BLD-MCNC: 82 MG/DL (ref 70–99)
HDLC SERPL-MCNC: 40 MG/DL (ref 40–60)
LDL CHOLESTEROL CALCULATED: 91 MG/DL
TRIGL SERPL-MCNC: 63 MG/DL (ref 0–150)
TSH SERPL DL<=0.05 MIU/L-ACNC: 1.28 UIU/ML (ref 0.27–4.2)
VLDLC SERPL CALC-MCNC: 13 MG/DL

## 2020-03-13 PROCEDURE — 90471 IMMUNIZATION ADMIN: CPT | Performed by: FAMILY MEDICINE

## 2020-03-13 PROCEDURE — 99395 PREV VISIT EST AGE 18-39: CPT | Performed by: FAMILY MEDICINE

## 2020-03-13 PROCEDURE — 90715 TDAP VACCINE 7 YRS/> IM: CPT | Performed by: FAMILY MEDICINE

## 2020-03-13 PROCEDURE — 72100 X-RAY EXAM L-S SPINE 2/3 VWS: CPT

## 2020-03-13 NOTE — PATIENT INSTRUCTIONS
INSTRUCTIONS  · NEXT APPOINTMENT: Please schedule check-up in 6 months. · PLEASE TAKE THIS FORM TO CHECK-OUT WINDOW TO SCHEDULE NEXT VISIT. · PLEASE GET BLOODWORK DRAWN TODAY ON FIRST FLOOR in 170. Take orders with you. RESULTS- most blood tests back in couple days. We will call you if any problems. If bloodwork good, you will get letter in mail or notified thru [address] (if signed up) within 2 weeks. If you do not, please call office. · Get x-ray. Can [facility] to get the x-ray. No appointment needed. · Please get flu vaccine at pharmacy [facility]. May take Mucinex (guaifenisen) and Robitussin DM (guafenisen, dextromethorphan) for cough and congestion. May also try Vicks Vaporub. · AVOID decongestants like phenylephrine and pseudoephedrine. AVOID Afrin. · Alternate ibuprofen and tylenol daily for headache. Return if not resolved in a month. · Go to PT for back. Patient Education             Low Back Pain Exercises     Standing hamstring stretch: Place the heel of your leg on a stool about 15 inches high. Keep your knee straight. Lean forward, bending at the hips until you feel a mild stretch in the back of your thigh. Make sure you do not roll your shoulders and bend at the waist when doing this or you will stretch your lower back instead. Hold the stretch for 15 to 30 seconds. Repeat 3 times. Repeat the same stretch on your other leg. Cat and camel: Get down on your hands and knees. Let your stomach sag, allowing your back to curve downward. Hold this position for 5 seconds. Then arch your back and hold for 5 seconds. Do 3 sets of 10. Quadriped Arm/Leg Raises: Get down on your hands and knees. Tighten your abdominal muscles to stiffen your spine. While keeping your abdominals tight, raise one arm and the opposite leg away from you. Hold this position for 5 seconds. Lower your arm and leg slowly and alternate sides. Do this 10 times on each side.    Pelvic tilt: Lie on your back with your knees bent and your feet flat on the floor. Tighten your abdominal muscles and push your lower back into the floor. Hold this position for 5 seconds, then relax. Do 3 sets of 10. Partial curl: Lie on your back with your knees bent and your feet flat on the floor. Tighten your stomach muscles and flatten your back against the floor. Tuck your chin to your chest. With your hands stretched out in front of you, curl your upper body forward until your shoulders clear the floor. Hold this position for 3 seconds. Don't hold your breath. It helps to breathe out as you lift your shoulders up. Relax. Repeat 10 times. Build to 3 sets of 10. To challenge yourself, clasp your hands behind your head and keep your elbows out to the side. Lower trunk rotation: Lie on your back with your knees bent and your feet flat on the floor. Tighten your abdominal muscles and push your lower back into the floor. Keeping your shoulders down flat, gently rotate your legs to one side, then the other as far as you can. Repeat 10 to 20 times. Single knee to chest stretch: Lie on your back with your legs straight out in front of you. Bring one knee up to your chest and grasp the back of your thigh. Pull your knee toward your chest, stretching your buttock muscle. Hold this position for 15 to 30 seconds and return to the starting position. Repeat 3 times on each side. Double knee to chest: Lie on your back with your knees bent and your feet flat on the floor. Tighten your abdominal muscles and push your lower back into the floor. Pull both knees up to your chest. Hold for 5 seconds and repeat 10 to 20 times. How can I take care of myself? In addition to the treatment described above, keep in mind these suggestions:   Use an electric heating pad on a low setting (or a hot water bottle wrapped in a towel to avoid burning yourself) for 20 to 30 minutes. Don't let the heating pad get too hot, and don't fall asleep with it. You could get a burn. Try putting an ice pack wrapped in a towel on your back for 20 minutes, one to four times a day. Set an alarm to avoid frostbite from using the ice pack too long. Put a pillow under your knees when you are lying down. Sleep without a pillow under your head. Lose weight if you are overweight. Practice good posture. Stand with your head up, shoulders straight, chest forward, weight balanced evenly on both feet, and pelvis tucked in. Pain is the best way to  the pace you should set in increasing your activity and exercise. Minor discomfort, stiffness, soreness, and mild aches need not interfere with activity. However, limit your activities temporarily if:   Your symptoms return. The pain increases when you are more active. The pain increases within 24 hours after a new or higher level of activity. To rest your back, hold each of these positions for 5 minutes or longer:   Lie on your back, bend your knees, and put pillows under your knees. Lie on your back, put a pillow under your neck, bend your knees to a 90-degree angle, and put your lower legs and feet on a chair. Lie on your back, bend your knees, and bring one knee up to your chest and hold it there. Repeat with the other knee, then bring both knees to your chest. When holding your knee to your chest, grab your thigh rather than your lower leg to avoid over flexing your knee. HAIR LOSS     Overview   What is the normal cycle of hair growth and loss? The normal cycle of hair growth lasts for 2 to 3 years. Each hair grows approximately 1 centimeter per month during this phase. About 90 percent of the hair on your scalp is growing at any one time. About 10 percent of the hair on your scalp, at any one time, is in a resting phase. After 3 to 4 months, the resting hair falls out and new hair starts to grow in its place. It is normal to shed some hair each day as part of this cycle.  However, some people may experience excessive (more than normal) hair loss. Hair loss of this type can affect men, women and children. What is common baldness? \"Common baldness\" usually means male-pattern baldness, or permanent-pattern baldness. It is also called androgenetic alopecia. Male-pattern baldness is the most common cause of hair loss in men. Men who have this type of hair loss usually have inherited the trait. Men who start losing their hair at an early age tend to develop more extensive baldness. In male-pattern baldness, hair loss typically results in a receding hair line and baldness on the top of the head. Women may develop female-pattern baldness. In this form of hair loss, the hair can become thin over the entire scalp. Causes & Risk Factors   What causes excessive hair loss? A number of things can cause excessive hair loss. For example, about 3 or 4 months after an illness or a major surgery, you may suddenly lose a large amount of hair. This hair loss is related to the stress of the illness and is temporary. Hormonal problems may cause hair loss. If your thyroid gland is overactive or underactive, your hair may fall out. This hair loss usually can be helped by treatment thyroid disease. Hair loss may occur if male or female hormones, known as androgens and estrogens, are out of balance. Correcting the hormone imbalance may stop your hair loss. Many women notice hair loss about 3 months after they've had a baby. This loss is also related to hormones. During pregnancy, high levels of certain hormones cause the body to keep hair that would normally fall out. When the hormones return to pre-pregnancy levels, that hair falls out and the normal cycle of growth and loss starts again. Some medicines can cause hair loss. This type of hair loss improves when you stop taking the medicine.  Medicines that can cause hair loss include blood thinners (also called anticoagulants), medicines used for gout, high blood pressure or heart problems, vitamin A (if too much is taken), birth control pills and antidepressants. Certain infections can cause hair loss. Fungal infections of the scalp can cause hair loss in children. The infection is easily treated with antifungal medicines. Finally, hair loss may occur as part of an underlying disease, such as lupus or diabetes. Since hair loss may be an early sign of a disease, it is important to find the cause so that it can be treated. Can certain hairstyles or treatments cause hair loss? Yes. If you wear pigtails or cornrows or use tight hair rollers, the pull on your hair can cause a type of hair loss called traction alopecia (say: cp-wy-iwe-sha). If the pulling is stopped before scarring of the scalp develops, your hair will grow back normally. However, scarring can cause permanent hair loss. Hot oil hair treatments or chemicals used in permanents (also called \"perms\") may cause inflammation (swelling) of the hair follicle, which can result in scarring and hair loss. Diagnosis & Tests   Can my doctor do something to stop hair loss? Perhaps. Your doctor will probably ask you some questions about your diet, any medicines you're taking, whether you've had a recent illness and how you take care of your hair. If you're a woman, your doctor may ask questions about your menstrual cycle, pregnancies and menopause. Your doctor may want to do a physical exam to look for other causes of hair loss. Finally, blood tests or a biopsy (taking a small sample of cells to examine under a microscope) of your scalp may be needed. Treatment   Is there any treatment for hair loss? Depending on your type of hair loss, treatments are available. If a medicine is causing your hair loss, your doctor may be able to prescribe a different medicine. Recognizing and treating an infection may help stop the hair loss. Correcting a hormone imbalance may prevent further hair loss.    Medicines may also help slow or

## 2020-03-13 NOTE — PROGRESS NOTES
findings  · Spine symmetric, no deformities, no kyphosis      Assessment and Plan:      Diagnosis Orders   1. Well adult health check  Glucose    Lipid Panel   2. Chronic left-sided low back pain without sciatica  XR LUMBAR SPINE (MIN 4 VIEWS)   3. Recurrent major depressive disorder, in full remission ([facility] [address]     4. Alopecia  TSH without Reflex   5. Need for tetanus booster  Tdap (age 6y and older) IM (Boostrix)   Stable. Plan as above and below. INSTRUCTIONS  · NEXT APPOINTMENT: Please schedule check-up in 6 months. · PLEASE TAKE THIS FORM TO CHECK-OUT WINDOW TO SCHEDULE NEXT VISIT. · PLEASE GET BLOODWORK DRAWN TODAY ON FIRST FLOOR in 170. Take orders with you. RESULTS- most blood tests back in couple days. We will call you if any problems. If bloodwork good, you will get letter in mail or notified thru [address] (if signed up) within 2 weeks. If you do not, please call office. · Get x-ray. Can walk in to [facility] to get the x-ray. No appointment needed. · Please get flu vaccine at pharmacy ASAP. May take Mucinex (guaifenisen) and Robitussin DM (guafenisen, dextromethorphan) for cough and congestion. May also try Vicks Vaporub. · AVOID decongestants like phenylephrine and pseudoephedrine. AVOID Afrin. · Alternate ibuprofen and tylenol daily for headache. Return if not resolved in a month. · Go to PT for back.

## 2020-09-18 ENCOUNTER — TELEMEDICINE (OUTPATIENT)
Dept: FAMILY MEDICINE CLINIC | Age: 30
End: 2020-09-18
Payer: COMMERCIAL

## 2020-09-18 PROCEDURE — 99441 PR PHYS/QHP TELEPHONE EVALUATION 5-10 MIN: CPT | Performed by: FAMILY MEDICINE

## 2020-09-18 RX ORDER — ARIPIPRAZOLE 5 MG/1
5 TABLET ORAL NIGHTLY
Qty: 30 TABLET | Refills: 5 | Status: SHIPPED | OUTPATIENT
Start: 2020-09-18 | End: 2020-10-01 | Stop reason: DRUGHIGH

## 2020-09-18 RX ORDER — FLUOXETINE HYDROCHLORIDE 40 MG/1
40 CAPSULE ORAL DAILY
Qty: 30 CAPSULE | Refills: 5 | Status: SHIPPED | OUTPATIENT
Start: 2020-09-18 | End: 2021-09-24 | Stop reason: SDUPTHER

## 2020-09-18 NOTE — PROGRESS NOTES
PHONE VISIT    Alfred Cardenas is a 35 y.o. female evaluated via telephone on 9/18/2020. Consent:  She and/or health care decision maker is aware that that she may receive a bill for this telephone service, depending on her insurance coverage, and has provided verbal consent to proceed: Yes    I affirm this is a Patient Initiated Episode with an Established Patient who has not had a related appointment within my department in the past 7 days or scheduled within the next 24 hours. Mood Visit  Subjective:     Chief Complaint   Patient presents with   Danica Christianson is a 35 y.o. female who presents for follow up of mood issue. HISTORY  Are you working with a psychologist / psychiatrist?  No  Have you felt your symptoms are better, worse, or unchanged since your last visit   better  Mood is good. Sleep is poor. Stressors: lost a family member and grandmother has only a few weeks left. Current symptoms include: difficulty concentrating, fatigue, hypersomnia and insomnia,difficulty concentrating, fatigue, irritable. Patient denies depression symptoms of: suicidal intention  Patient denies anxiety symptoms of: losing control. Other issues:  thinks she needs a sleep study he wakes her up because it sounds like she is stuggling to breathe. Needs new ref for PT gave her one and then covid hit and they couldn't get her in and now they said she needs a new one for her back. Sleep non-restorative. only sleeps 4 h. Review of Systems   General ROS: fever? No,    Night sweats? No  Ophthalmic ROS:blurry vision or decreased vision? No  Endocrine ROS:lethargy? No   Unexpected weight changes? No  Respiratory ROS: cough? No   Shortness of breath? No  Cardiovascular ROS:chest pain? No   Shortness of breath with exertion? No  Gastrointestinal ROS: abdominal pain? No   Change in stools?  No    HISTORY:  Patient's medications, allergies, past medical, and social histories were reviewed and updated Drug use: No      LAST LABS  Cholesterol, Total   Date Value Ref Range Status   03/13/2020 144 0 - 199 mg/dL Final     LDL Calculated   Date Value Ref Range Status   03/13/2020 91 <100 mg/dL Final     HDL   Date Value Ref Range Status   03/13/2020 40 40 - 60 mg/dL Final     Triglycerides   Date Value Ref Range Status   03/13/2020 63 0 - 150 mg/dL Final     Lab Results   Component Value Date    GLUCOSE 82 03/13/2020     Lab Results   Component Value Date     10/01/2018    K 3.9 10/01/2018    CREATININE <0.5 (L) 10/01/2018     Lab Results   Component Value Date    WBC 7.5 05/02/2019    HGB 12.1 05/02/2019    HCT 37.0 05/02/2019    MCV 81.3 05/02/2019     05/02/2019     Lab Results   Component Value Date    ALT 17 06/19/2017    AST 15 06/19/2017    ALKPHOS 80 06/19/2017    BILITOT 0.7 06/19/2017     TSH (uIU/mL)   Date Value   03/13/2020 1.28     No results found for: LABA1C   Assessment and Plan:      Diagnosis Orders   1. Moderate episode of recurrent major depressive disorder (HCC)  FLUoxetine (PROZAC) 40 MG capsule    ARIPiprazole (ABILIFY) 5 MG tablet   2. Sleep apnea, unspecified type  Ambulatory referral to Sleep Medicine     INSTRUCTIONS  NEXT APPOINTMENT: Please schedule annual complete physical (30 minutes) in 6 months. I affirm this is a Patient Initiated Episode with an Established Patient who has not had a related appointment within my department in the past 7 days or scheduled within the next 24 hours.     Total Time: minutes: 5-10 minutes    Note: not billable if this call serves to triage the patient into an appointment for the relevant concern    [facility]

## 2020-10-01 ENCOUNTER — TELEPHONE (OUTPATIENT)
Dept: FAMILY MEDICINE CLINIC | Age: 30
End: 2020-10-01

## 2020-10-01 ENCOUNTER — NURSE TRIAGE (OUTPATIENT)
Dept: OTHER | Facility: CLINIC | Age: 30
End: 2020-10-01

## 2020-10-01 RX ORDER — ARIPIPRAZOLE 5 MG/1
10 TABLET ORAL NIGHTLY
Qty: 30 TABLET | Refills: 5 | Status: SHIPPED | COMMUNITY
Start: 2020-10-01 | End: 2021-09-24 | Stop reason: SINTOL

## 2020-10-01 NOTE — TELEPHONE ENCOUNTER
Now reporting hearing voices. (see previous message from triage. Please get her in with [facility] ASAP. Please tell patient to increase Abilify to 2 at bedtime. See either psych or myself in next 2 weeks.

## 2020-10-01 NOTE — TELEPHONE ENCOUNTER
self-care, school, work, interactions)      Increased stress lately. 6. SUPPORT: \"Who is with you now? \" \"Who do you live with?\" \"Do you have family or friends nearby who you can talk to? \"           7. THERAPIST: \"Do you have a counselor or therapist? Name? \"      Denies    8. STRESSORS: \"Has there been any new stress or recent changes in your life? \"      Yes    9. DRUG ABUSE/ALCOHOL: \"Do you drink alcohol or use any illegal drugs? \"       Denies    10. OTHER: \"Do you have any other health or medical symptoms right now? \" (e.g., fever)        Denies    11. PREGNANCY: \"Is there any chance you are pregnant? \" \"When was your last menstrual period? \"        Denies    Protocols used: SCHIZOPHRENIA-ADULT-AH    Patient called pre-service center [facility]) to schedule appointment, with red flag complaint, transferred to RN access for triage. See above questions and answers. Caller talking full sentences without any distress on phone. Discussed disposition and patient agreeable. Discussed potential consequences for not following disposition recommendation. Aware to call back with any concerns or persistent, worsening, or new symptoms develop. Warm transfer to [facility] scheduling for appointment. Attention Provider: Thank you for allowing me to participate in the care of your patient. The  patient was connected to triage in response to information provided to the [facility]. Please do not respond through this encounter as the response is not directed to a shared pool.

## 2020-10-02 NOTE — TELEPHONE ENCOUNTER
Patient was scheduled for appt today cancelled  spoke to ma yesterday did not need appt today. Scheduled with ananda purcell first available.  10/26     Not sure if needing to be seen by dr Andreea Li before the 26th?

## 2020-10-06 ENCOUNTER — OFFICE VISIT (OUTPATIENT)
Dept: SLEEP MEDICINE | Age: 30
End: 2020-10-06
Payer: COMMERCIAL

## 2020-10-06 VITALS
WEIGHT: 177 LBS | OXYGEN SATURATION: 94 % | TEMPERATURE: 99 F | HEART RATE: 80 BPM | RESPIRATION RATE: 18 BRPM | DIASTOLIC BLOOD PRESSURE: 88 MMHG | SYSTOLIC BLOOD PRESSURE: 124 MMHG | HEIGHT: 61 IN | BODY MASS INDEX: 33.42 KG/M2

## 2020-10-06 PROCEDURE — 99204 OFFICE O/P NEW MOD 45 MIN: CPT | Performed by: PSYCHIATRY & NEUROLOGY

## 2020-10-06 ASSESSMENT — SLEEP AND FATIGUE QUESTIONNAIRES
ESS TOTAL SCORE: 20
HOW LIKELY ARE YOU TO NOD OFF OR FALL ASLEEP WHILE SITTING AND TALKING TO SOMEONE: 2
HOW LIKELY ARE YOU TO NOD OFF OR FALL ASLEEP WHEN YOU ARE A PASSENGER IN A CAR FOR AN HOUR WITHOUT A BREAK: 3
HOW LIKELY ARE YOU TO NOD OFF OR FALL ASLEEP WHILE LYING DOWN TO REST IN THE AFTERNOON WHEN CIRCUMSTANCES PERMIT: 3
HOW LIKELY ARE YOU TO NOD OFF OR FALL ASLEEP WHILE WATCHING TV: 3
HOW LIKELY ARE YOU TO NOD OFF OR FALL ASLEEP WHILE SITTING INACTIVE IN A PUBLIC PLACE: 3
NECK CIRCUMFERENCE (INCHES): 15.5
HOW LIKELY ARE YOU TO NOD OFF OR FALL ASLEEP IN A CAR, WHILE STOPPED FOR A FEW MINUTES IN TRAFFIC: 1
HOW LIKELY ARE YOU TO NOD OFF OR FALL ASLEEP WHILE SITTING AND READING: 3
HOW LIKELY ARE YOU TO NOD OFF OR FALL ASLEEP WHILE SITTING QUIETLY AFTER LUNCH WITHOUT ALCOHOL: 2

## 2020-10-06 ASSESSMENT — ENCOUNTER SYMPTOMS
APNEA: 1
EYES NEGATIVE: 1
CHOKING: 1
GASTROINTESTINAL NEGATIVE: 1

## 2020-10-06 NOTE — PATIENT INSTRUCTIONS
Orders Placed This Encounter   Procedures    Home Sleep Study     Standing Status:   Future     Standing Expiration Date:   10/6/2021     Order Specific Question:   Location For Sleep Study     Answer:   [place]     Order Specific Question:   Select Sleep Lab Location     Answer:   [facility]        Patient Education        Sleep Apnea: Care Instructions  Your Care Instructions     Sleep apnea means that you frequently stop breathing for 10 seconds or longer during sleep. It can be mild to severe, based on the number of times an hour that you stop breathing or have slowed breathing. Blocked or narrowed airways in your nose, mouth, or throat can cause sleep apnea. Your airway can become blocked when your throat muscles and tongue relax during sleep. You can treat sleep apnea at home by making lifestyle changes. You also can use a CPAP breathing machine that keeps tissues in the throat from blocking your airway. Or your doctor may suggest that you use a breathing device while you sleep. It helps keep your airway open. This could be a device that you put in your mouth. In some cases, surgery may be needed to remove enlarged tissues in the throat. Follow-up care is a key part of your treatment and safety. Be sure to make and go to all appointments, and call your doctor if you are having problems. It's also a good idea to know your test results and keep a list of the medicines you take. How can you care for yourself at home? · Lose weight, if needed. It may reduce the number of times you stop breathing or have slowed breathing. · Sleep on your side. It may stop mild apnea. If you tend to roll onto your back, sew a pocket in the back of your pajama top. Put a tennis ball into the pocket, and stitch the pocket shut. This will help keep you from sleeping on your back. · Avoid alcohol and medicines such as sleeping pills and sedatives before bed. · Do not smoke. Smoking can make sleep apnea worse.  If you need help quitting, talk to your doctor about stop-smoking programs and medicines. These can increase your chances of quitting for good. · Prop up the head of your bed 4 to 6 inches by putting bricks under the legs of the bed. · Treat breathing problems, such as a stuffy nose, caused by a cold or allergies. · Try a continuous positive airway pressure (CPAP) breathing machine if your doctor recommends it. The machine keeps your airway open when you sleep. · If CPAP does not work for you, ask your doctor if you can try other breathing machines. A bilevel positive airway pressure machine uses one type of air pressure for breathing in and another type for breathing out. Another device raises or lowers air pressure as needed while you breathe. · Talk to your doctor if:  ? Your nose feels dry or bleeds when you use one of these machines. You may need to increase moisture in the air. A humidifier may help. ? Your nose is runny or stuffy from using a breathing machine. Decongestants or a corticosteroid nasal spray may help. ? You are sleepy during the day and it gets in the way of the normal things you do. Do not drive when you are drowsy. When should you call for help? Watch closely for changes in your health, and be sure to contact your doctor if:  · You still have sleep apnea even though you have made lifestyle changes. · You are thinking of trying a device such as CPAP. · You are having problems using a CPAP or similar machine. Where can you learn more? Go to [email or web address] org and sign in to your [employer] account. Enter P441 in the [facility] box to learn more about \"Sleep Apnea: Care Instructions. \"     If you do not have an account, please click on the \"Sign Up Now\" link. Current as of: February 24, 2020               Content Version: 12.5  © 2006-2020 [employer]. Care instructions adapted under license by [facility] ([facility]).  If you have questions about a medical condition or this instruction, always ask your healthcare professional. [place] [address] any warranty or liability for your use of this information.

## 2020-10-06 NOTE — PROGRESS NOTES
MD TRISHA Brady Board Certified in Sleep Medicine  Certified [facility] Sleep Medicine  Board Certified in Neurology [address]  [address] [address] [address] [place], [place] [id number] [facility], [address]           [address]  [address] [place] [id number] [phone]    Subjective:     Patient ID: Aide Sibley is a 35 y.o. female. Chief Complaint   Patient presents with    Sleep Apnea     NP ERVIN       HPI:        Aide Sibley is a 35 y.o. female referred by Dr Aretta Heimlich for a sleep evaluation. She complains of snoring, snorting, choking, periods of not breathing, tossing and turning, excessive daytime sleepiness, feels sleepy during the day, take naps during the day but she denies knees buckling with laughing, completely or partially paralyzed while falling asleep or waking up, noisy environment, uncomfortable room temperature, uncomfortable bedding. Symptoms began a few years ago, gradually worsening since that time. The patient's bed-partner confirmed the snoring and stopped breathing at night  SLEEP SCHEDULE: Goes to bed around 9 PM in the weekdays and 9 PM in the weekends. It usually takes the patient 60 minutes to fall asleep. The patient gets up 1 per night to go to the bathroom. The Patient finally gets up at 8 AM during the weekdays and 8 AM in the weekends. patient wakes up with dry mouth and sometimes morning headache. . the headache usually dull headache lasts 30-60 minutes. The patient has restless sleep with frequent arousals in addition to the Patient has significant daytime sleepiness. The Patient scored Total score: 20 on [place] Sleepiness Scale ( more than 10 is indicative of daytime sleepiness)and 51 in fatigue scale ( more than 36 is indicative of daytime fatigue).  The patient takes 1-2 naps/day for  minutes and usually is not refreshing nap. Previous evaluation and treatment has included- none. The Patient has been obese for many years and tried, has gained 40 in the last 5 years,  unsuccessfully to lose weight through diet, exercise. DOT/CDL - N/A  NIKHIL/Shaye - N/A      Previous Report(s) Reviewed: historical medical records       Social History     Socioeconomic History    Marital status:      Spouse name: Not on file    Number of children: Not on file    Years of education: Not on file    Highest education level: Not on file   Occupational History    Not on file   Social Needs    Financial resource strain: Not on file    Food insecurity     Worry: Not on file     Inability: Not on file    Transportation needs     Medical: Not on file     Non-medical: Not on file   Tobacco Use    Smoking status: Former Smoker     Packs/day: 0.50     Types: Cigarettes    Smokeless tobacco: Never Used    Tobacco comment: did quit but started again   Substance and Sexual Activity    Alcohol use: Yes     Alcohol/week: 1.0 standard drinks     Types: 1 Glasses of wine per week    Drug use: No    Sexual activity: Yes     Partners: Male   Lifestyle    Physical activity     Days per week: Not on file     Minutes per session: Not on file    Stress: Not on file   Relationships    Social connections     Talks on phone: Not on file     Gets together: Not on file     Attends Orthodoxy service: Not on file     Active member of club or organization: Not on file     Attends meetings of clubs or organizations: Not on file     Relationship status: Not on file    Intimate partner violence     Fear of current or ex partner: Not on file     Emotionally abused: Not on file     Physically abused: Not on file     Forced sexual activity: Not on file   Other Topics Concern    Not on file   Social History Narrative    Not on file       Prior to Admission medications    Medication Sig Start Date End Date Taking? deficit present. Psychiatric:         Mood and Affect: Mood normal.         Assessment:   Obstructive sleep apnea especially with snoring, snorting,  observed apnea, daytime sleepiness, large neck circumference, Mallampati class of 4 and obesity. Diagnosis Orders   1. Obstructive sleep apnea  Home Sleep Study   2. Obesity (BMI 30.0-34. 9)  Home Sleep Study     Plan:     Patient was counseled about the pathophysiology of obstructive sleep apnea syndrome and the methods for evaluating its presence and severity. Patient was counseled to avoid driving and other potentially hazardous circumstances if the patient is experiencing excessive sleepiness. Treatment considerations include the use of nasal CPAP, oral dental appliance or a surgical intervention, which should be based on otolarygologic findings, In the meantime, the patient should be cautioned to avoid the use of alcohol or other depressant medications because of potential for increasing the duration and severity of apnea and cautioned regarding driving or operating and dangerous equipment if the patient is experiencing daytime sleepiness. .      We discussed the proportionality between weight and AHI. With 10% weight change, the AHI has a 27% proportionate change. With 20% weight change, the AHI has a 45-50% proportionate change. Orders Placed This Encounter   Procedures    Home Sleep Study       Return in about 3 months (around 1/6/2021) for Reveiwing CPAP usage and compliance report and tro.     Skyler Feldman MD  Medical Director - [facility]

## 2020-10-14 ENCOUNTER — HOSPITAL ENCOUNTER (OUTPATIENT)
Dept: SLEEP CENTER | Age: 30
Discharge: HOME | End: 2020-10-14
Payer: COMMERCIAL

## 2020-10-14 PROCEDURE — 95806 SLEEP STUDY UNATT&RESP EFFT: CPT

## 2020-10-16 PROCEDURE — 95806 SLEEP STUDY UNATT&RESP EFFT: CPT | Performed by: PSYCHIATRY & NEUROLOGY

## 2020-10-19 ENCOUNTER — TELEPHONE (OUTPATIENT)
Dept: SLEEP MEDICINE | Age: 30
End: 2020-10-19

## 2020-10-20 NOTE — PROGRESS NOTES
Da Marie         : 1990    Diagnosis: [x] ERVIN (G47.33) [] CSA (G47.31) [] Apnea (G47.30)   Length of Need: [] 12 Months [x] 99 Months [] Other:    Machine (THERESA!): [x] Respironics Dream Station      Auto [x] ResMed AirSense     Auto [] Other:     [x]  CPAP () [] Bilevel ()   Mode: [x] Auto [] Spontaneous    Mode: [] Auto [] Spontaneous           Between 5 and 15 cm                 Comfort Settings:   - Ramp Pressure: 5 cmH2O                                        - Ramp time: 15 min                                     -  Flex/EPR - 3 full time                                    - For ResMed Bilevel (TiMax-4 sec   TiMin- 0.2 sec)     Humidifier: [x] Heated ()        [x] Water chamber replacement ()/ 1 per 6 months        Mask:   [x] Nasal () /1 per 3 months [x] Full Face () /1 per 3 months   [x] Patient choice -Size and fit mask [x] Patient Choice - Size and fit mask   [] Dispense:  [] Dispense:    [x] Headgear () / 1 per 3 months [x] Headgear () / 1 per 3 months   [x] Replacement Nasal Cushion ()/2 per month [x] Interface Replacement ()/1 per month   [x] Replacement Nasal Pillows ()/2 per month         Tubing: [x] Heated ()/1 per 3 months    [] Standard ()/1 per 3 months [] Other:           Filters: [x] Non-disposable ()/1 per 6 months     [x] Ultra-Fine, Disposable ()/2 per month        Miscellaneous: [x] Chin Strap ()/ 1 per 6 months [] O2 bleed-in:       LPM   [] Oximetry on CPAP/Bilevel []  Other:          Start Order Date: 10/20/20    MEDICAL JUSTIFICATION:  I, the undersigned, certify that the above prescribed supplies are medically necessary for this patients wellbeing. In my opinion, the supplies are both reasonable and necessary in reference to accepted standards of medicalpractice in treatment of this patients condition.     Elvira Martinez MD      NPI: [id number]       Order Signed Date: 10/20/20    Electronically signed by Adriana Thomas MD on 10/20/2020 at 8:45 AM

## 2020-10-26 ENCOUNTER — OFFICE VISIT (OUTPATIENT)
Dept: PSYCHIATRY | Age: 30
End: 2020-10-26
Payer: COMMERCIAL

## 2020-10-26 PROCEDURE — 99204 OFFICE O/P NEW MOD 45 MIN: CPT | Performed by: NURSE PRACTITIONER

## 2020-10-26 RX ORDER — PROPRANOLOL HYDROCHLORIDE 10 MG/1
10 TABLET ORAL 3 TIMES DAILY PRN
Qty: 90 TABLET | Refills: 2 | Status: SHIPPED | OUTPATIENT
Start: 2020-10-26 | End: 2021-09-24 | Stop reason: SDUPTHER
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
## 2021-01-19 ENCOUNTER — HOSPITAL ENCOUNTER (EMERGENCY)
Age: 31
Discharge: HOME | End: 2021-01-19
Attending: EMERGENCY MEDICINE
Payer: COMMERCIAL

## 2021-01-19 ENCOUNTER — APPOINTMENT (OUTPATIENT)
Dept: CT IMAGING | Age: 31
End: 2021-01-19
Payer: COMMERCIAL

## 2021-01-19 VITALS
WEIGHT: 174.82 LBS | HEART RATE: 80 BPM | TEMPERATURE: 98.7 F | BODY MASS INDEX: 33.01 KG/M2 | HEIGHT: 61 IN | RESPIRATION RATE: 16 BRPM | SYSTOLIC BLOOD PRESSURE: 124 MMHG | DIASTOLIC BLOOD PRESSURE: 82 MMHG | OXYGEN SATURATION: 99 %

## 2021-01-19 DIAGNOSIS — N83.201 RIGHT OVARIAN CYST: Primary | ICD-10-CM

## 2021-01-19 LAB
A/G RATIO: 1.3 (ref 1.1–2.2)
ALBUMIN SERPL-MCNC: 4.3 G/DL (ref 3.4–5)
ALP BLD-CCNC: 77 U/L (ref 40–129)
ALT SERPL-CCNC: 53 U/L (ref 10–40)
ANION GAP SERPL CALCULATED.3IONS-SCNC: 8 MMOL/L (ref 3–16)
AST SERPL-CCNC: 34 U/L (ref 15–37)
BACTERIA: ABNORMAL /HPF
BASOPHILS ABSOLUTE: 0.1 K/UL (ref 0–0.2)
BASOPHILS RELATIVE PERCENT: 0.7 %
BILIRUB SERPL-MCNC: 0.4 MG/DL (ref 0–1)
BILIRUBIN URINE: NEGATIVE
BLOOD, URINE: NEGATIVE
BUN BLDV-MCNC: 6 MG/DL (ref 7–20)
CALCIUM SERPL-MCNC: 9.4 MG/DL (ref 8.3–10.6)
CHLORIDE BLD-SCNC: 104 MMOL/L (ref 99–110)
CLARITY: ABNORMAL
CO2: 27 MMOL/L (ref 21–32)
COLOR: YELLOW
CREAT SERPL-MCNC: 0.7 MG/DL (ref 0.6–1.1)
EOSINOPHILS ABSOLUTE: 0.1 K/UL (ref 0–0.6)
EOSINOPHILS RELATIVE PERCENT: 1.7 %
EPITHELIAL CELLS, UA: ABNORMAL /HPF (ref 0–5)
GFR AFRICAN AMERICAN: >60
GFR NON-AFRICAN AMERICAN: >60
GLOBULIN: 3.2 G/DL
GLUCOSE BLD-MCNC: 103 MG/DL (ref 70–99)
GLUCOSE URINE: NEGATIVE MG/DL
HCG(URINE) PREGNANCY TEST: NEGATIVE
HCT VFR BLD CALC: 37.4 % (ref 36–48)
HEMOGLOBIN: 11.9 G/DL (ref 12–16)
KETONES, URINE: 40 MG/DL
LEUKOCYTE ESTERASE, URINE: ABNORMAL
LIPASE: 41 U/L (ref 13–60)
LYMPHOCYTES ABSOLUTE: 2.2 K/UL (ref 1–5.1)
LYMPHOCYTES RELATIVE PERCENT: 25.5 %
MCH RBC QN AUTO: 24.6 PG (ref 26–34)
MCHC RBC AUTO-ENTMCNC: 31.8 G/DL (ref 31–36)
MCV RBC AUTO: 77.2 FL (ref 80–100)
MICROSCOPIC EXAMINATION: YES
MONOCYTES ABSOLUTE: 0.5 K/UL (ref 0–1.3)
MONOCYTES RELATIVE PERCENT: 5.3 %
MUCUS: ABNORMAL /LPF
NEUTROPHILS ABSOLUTE: 5.9 K/UL (ref 1.7–7.7)
NEUTROPHILS RELATIVE PERCENT: 66.8 %
NITRITE, URINE: NEGATIVE
PDW BLD-RTO: 16.1 % (ref 12.4–15.4)
PH UA: 5.5 (ref 5–8)
PLATELET # BLD: 434 K/UL (ref 135–450)
PMV BLD AUTO: 8.9 FL (ref 5–10.5)
POTASSIUM SERPL-SCNC: 3.8 MMOL/L (ref 3.5–5.1)
PROTEIN UA: NEGATIVE MG/DL
RBC # BLD: 4.85 M/UL (ref 4–5.2)
RBC UA: ABNORMAL /HPF (ref 0–4)
SODIUM BLD-SCNC: 139 MMOL/L (ref 136–145)
SPECIFIC GRAVITY UA: 1.02 (ref 1–1.03)
TOTAL PROTEIN: 7.5 G/DL (ref 6.4–8.2)
URINE REFLEX TO CULTURE: ABNORMAL
URINE TYPE: ABNORMAL
UROBILINOGEN, URINE: 0.2 E.U./DL
WBC # BLD: 8.8 K/UL (ref 4–11)
WBC UA: ABNORMAL /HPF (ref 0–5)

## 2021-01-19 PROCEDURE — 80053 COMPREHEN METABOLIC PANEL: CPT

## 2021-01-19 PROCEDURE — 83690 ASSAY OF LIPASE: CPT

## 2021-01-19 PROCEDURE — 84703 CHORIONIC GONADOTROPIN ASSAY: CPT

## 2021-01-19 PROCEDURE — 6360000004 HC RX CONTRAST MEDICATION: Performed by: EMERGENCY MEDICINE

## 2021-01-19 PROCEDURE — 81001 URINALYSIS AUTO W/SCOPE: CPT

## 2021-01-19 PROCEDURE — 74177 CT ABD & PELVIS W/CONTRAST: CPT

## 2021-01-19 PROCEDURE — 85025 COMPLETE CBC W/AUTO DIFF WBC: CPT

## 2021-01-19 PROCEDURE — 36415 COLL VENOUS BLD VENIPUNCTURE: CPT

## 2021-01-19 PROCEDURE — 99284 EMERGENCY DEPT VISIT MOD MDM: CPT

## 2021-01-19 RX ADMIN — IOPAMIDOL 100 ML: 755 INJECTION, SOLUTION INTRAVENOUS at 18:32

## 2021-01-19 ASSESSMENT — PAIN DESCRIPTION - PROGRESSION: CLINICAL_PROGRESSION: GRADUALLY WORSENING

## 2021-01-19 ASSESSMENT — PAIN SCALES - GENERAL
PAINLEVEL_OUTOF10: 6
PAINLEVEL_OUTOF10: 0

## 2021-01-19 ASSESSMENT — PAIN DESCRIPTION - PAIN TYPE
TYPE: ACUTE PAIN
TYPE: ACUTE PAIN

## 2021-01-19 ASSESSMENT — PAIN DESCRIPTION - LOCATION: LOCATION: ABDOMEN

## 2021-01-19 ASSESSMENT — PAIN DESCRIPTION - DESCRIPTORS: DESCRIPTORS: CONSTANT;ACHING

## 2021-01-19 NOTE — ED TRIAGE NOTES
Patient has had some right lower quadrant pain since Sunday. It has gotten worse and more constant. No fever, vomiting or diarrhea. Notices increased pain after she urinates. Vincent Guzman had history of ovarian cysts.

## 2021-01-19 NOTE — ED PROVIDER NOTES
[facility]  eMERGENCY dEPARTMENT eNCOUnter      Pt Name: Jaylene Hughes  MRN: [id number]  [place] 1990  Date of evaluation: 1/19/2021  Provider: Rod Andrews MD    CHIEF COMPLAINT       Chief Complaint   Patient presents with    Abdominal Pain     Right lower quadrant pain since Sunday. No fever, vomiting or diarrhea. No pain with urination, but notices increased discomfort in RLQ after urination. CRITICAL CARE TIME   Total Critical Care time was 0 minutes, excluding separately reportable procedures. There was a high probability of clinically significant/life threatening deterioration in the patient's condition which required my urgent intervention. HISTORY OF PRESENT ILLNESS  (Location/Symptom, Timing/Onset, Context/Setting, Quality, Duration, Modifying Factors, Severity.)   Jaylene Hughes is a 35 y.o. female who presents to the emergency department complaining of right lower abdominal pain. She first noticed the pain on Sunday. It was somewhat intermittent at first but has become more constant and gradually gotten worse. No nausea or vomiting. No change in bowel habits. No constipation or diarrhea. She states she may be urinating a little more frequently than usual, but no pain with urination. Her last menstrual period was about 4 weeks ago. She is a G5, P3 Ab2. Status post tubal ligation. No vaginal discharge. Her appetite's been good. States she has a little bit of aching in her low back, right greater than left. States she has had ovarian cyst in the past and it felt somewhat similar to the ovarian cyst in the beginning, but the pain is now constant more severe. Nursing Notes were reviewed and I agree. REVIEW OF SYSTEMS    (2-9 systems for level 4, 10 or more for level 5)     General: No fever or chills. No body aches. ENT: No nasal congestion sore throat or earache. Respiratory: No cough or shortness of breath.   Cardiovascular: No chest pain. GI: Right lower abdominal pain as above. Intermittent initially, now constant, gradually increasing. Nausea or vomiting. Normal appetite. No diarrhea constipation. : Last menstrual period 4 weeks ago. Frequency but no dysuria. No abnormal vaginal discharge. Musculoskeletal: Some aching in her low back, right greater than left. Except as noted above the remainder of the review of systems was reviewed and negative.        PAST MEDICAL HISTORY     Past Medical History:   Diagnosis Date    Abnormal Pap smear of cervix     will biopsy post partum    Anxiety     Chickenpox     Depression     HX OTHER MEDICAL     mild leg length and hand size discrepancy    IBS (irritable bowel syndrome)     Lactose intolerance     Mental disorder     Depression; not medicated currently    Ovarian cyst          SURGICAL HISTORY       Past Surgical History:   Procedure Laterality Date    TUBAL LIGATION           CURRENT MEDICATIONS       Previous Medications    ARIPIPRAZOLE (ABILIFY) 5 MG TABLET    Take 2 tablets by mouth nightly    BIOTIN W/ VITAMINS C & E (HAIR/SKIN/NAILS PO)    Take by mouth    BREXPIPRAZOLE (REXULTI) 1 MG TABS TABLET    Take 1 tablet by mouth daily    FEXOFENADINE-PSEUDOEPHEDRINE (ALLEGRA-D 24 HOUR PO)    Take 1 tablet by mouth once as needed    FLUOXETINE (PROZAC) 40 MG CAPSULE    Take 1 capsule by mouth daily    IBUPROFEN (ADVIL;MOTRIN) 600 MG TABLET    Take 1 tablet by mouth every 6 hours as needed for Pain    PROPRANOLOL (INDERAL) 10 MG TABLET    Take 1 tablet by mouth 3 times daily as needed (anxiety)       ALLERGIES     Depo-provera [medroxyprogesterone acetate] and Promethazine    FAMILY HISTORY       Family History   Problem Relation Age of Onset    Arthritis Mother     Depression Mother     Arthritis Father     Depression Father     Hearing Loss Sister     Learning Disabilities Brother     Mental Retardation Brother     Cancer Maternal Grandmother     Cancer Paternal Grandfather     Kidney Disease Paternal Grandfather           SOCIAL HISTORY       Social History     Socioeconomic History    Marital status:      Spouse name: None    Number of children: None    Years of education: None    Highest education level: None   Occupational History    None   Social Needs    Financial resource strain: None    Food insecurity     Worry: None     Inability: None    Transportation needs     Medical: None     Non-medical: None   Tobacco Use    Smoking status: Former Smoker     Packs/day: 0.50     Types: Cigarettes    Smokeless tobacco: Never Used    Tobacco comment: did quit but started again   Substance and Sexual Activity    Alcohol use: Yes     Alcohol/week: 1.0 standard drinks     Types: 1 Glasses of wine per week     Comment: twice a form    Drug use: No    Sexual activity: Yes     Partners: Male   Lifestyle    Physical activity     Days per week: None     Minutes per session: None    Stress: None   Relationships    Social connections     Talks on phone: None     Gets together: None     Attends Buddhist service: None     Active member of club or organization: None     Attends meetings of clubs or organizations: None     Relationship status: None    Intimate partner violence     Fear of current or ex partner: None     Emotionally abused: None     Physically abused: None     Forced sexual activity: None   Other Topics Concern    None   Social History Narrative    None         PHYSICAL EXAM    (up to 7 for level 4, 8 or more for level 5)     ED Triage Vitals [01/19/21 1746]   BP Temp Temp Source Pulse Resp SpO2 Height Weight   129/86 98 °F (36.7 °C) Oral 88 17 98 % 5' 1\" (1.549 m) 174 lb 13.2 oz (79.3 kg)       Dental: Alert white female, mildly obese, no acute distress. Head: Atraumatic and normocephalic. Eyes: No conjunctival injection. No pallor. Pupils equal round reactive. Extraocular movements are intact. ENT: Maria Eugenia Lawman is clear.   Oropharynx moist without erythema. Neck: Supple without adenopathy, nontender. Heart: Regular rate and rhythm. No murmurs or gallops noted. Lungs: Breath sounds equal bilaterally and clear. Abdomen: Mildly obese, soft, poorly localized tenderness in the right lower quadrant, most pronounced just below McBurney's area. No guarding or rebound. No masses organomegaly. Bowel sounds normal.  No flank tenderness. Musculoskeletal: No lower extremity edema. Intact symmetrical distal pulses. Skin: Warm and dry, good turgor, no rash. No pallor or cyanosis. Neuro: Awake, alert, oriented. No focal motor deficits. Normal gait. Mental status: Normal affect. DIFFERENTIAL DIAGNOSIS   Differential includes but is not limited to mesenteric lymphadenitis, appendicitis, ectopic pregnancy, ovarian torsion, ovarian cyst, pyelonephritis, ureterolithiasis. DIAGNOSTIC RESULTS     EKG: All EKG's are interpreted by Jareth Moncada MD in the absence of a cardiologist.      RADIOLOGY:   Non-plain film images such as CT, Ultrasound and MRI are read by the radiologist. Plain radiographic images are visualized and preliminarily interpreted Jareth Moncada MD with the below findings:      Interpretation per the Radiologist below, if available at the time of this note:    CT ABDOMEN PELVIS W IV CONTRAST Additional Contrast? None   Final Result   1. No acute finding in the abdomen or pelvis. 2. Normal appendix. 3. Physiologic menstrual changes. Recommendations are provided below. RECOMMENDATIONS:   2.1 cm benign appearing ovarian cyst. No follow-up imaging is recommended.       Reference: J Am Wilfrid Radiol 2013;[id number]               ED BEDSIDE ULTRASOUND:   Performed by ED Physician - none    LABS:  Labs Reviewed   URINE RT REFLEX TO CULTURE - Abnormal; Notable for the following components:       Result Value    Ketones, Urine 40 (*)     Leukocyte Esterase, Urine SMALL (*)     All other components within normal limits Narrative:     Performed at:  [address] [place] [address]  [address] [facility]   Phone [phone]   CBC WITH AUTO DIFFERENTIAL - Abnormal; Notable for the following components:    Hemoglobin 11.9 (*)     MCV 77.2 (*)     MCH 24.6 (*)     RDW 16.1 (*)     All other components within normal limits    Narrative:     Performed at:  [place] [address] [address] [facility]   Phone [phone]   COMPREHENSIVE METABOLIC PANEL - Abnormal; Notable for the following components:    Glucose 103 (*)     BUN 6 (*)     ALT 53 (*)     All other components within normal limits    Narrative:     Performed at:  [place] [address] [address] [facility]   Phone [phone]   MICROSCOPIC URINALYSIS - Abnormal; Notable for the following components:    Mucus, UA 1+ (*)     WBC, UA 6-9 (*)     Bacteria, UA 1+ (*)     All other components within normal limits    Narrative:     Performed at:  [place] [address] [address] [facility]   Phone [phone]   PREGNANCY, URINE    Narrative:     Performed at:  [address] [place] [address]   Phone [phone]   LIPASE    Narrative:     Performed at:  [address] [place] [address]   Phone [phone]       All other labs were within normal range or not returned as of this dictation.     EMERGENCY DEPARTMENT COURSE and DIFFERENTIAL DIAGNOSIS/MDM:   Vitals:    Vitals:    01/19/21 1746 01/19/21 1854   BP: 129/86 120/81   Pulse: 88 82   Resp: 17 16   Temp: 98 °F (36.7 °C) 98.5 °F (36.9 °C)   TempSrc: Oral Oral   SpO2: 98% 98%   Weight: 174 lb 13.2 oz (79.3 kg)    Height: 5' 1\" (1.549 m)        Patient presents with some right lower abdominal pain that she first noticed on Sunday. It was intermittent initially. She states she has had ovarian cyst in the past and it felt similar so she just ignored it. It became worse constant and increase in severity so she decided to come in. She has had no constitutional symptoms. No fever, no nausea, no vomiting. Her appetite's been good, she has been eating normally. She states she should be starting her menstrual cycle any day now. She denies any vaginal discharge. No Juan R urgency or dysuria. Her H&H is stable. Her white blood cell count is normal with no shift. She has minimal elevation of her ALT, her liver enzymes and bilirubin are otherwise normal.  Her renal function is normal.  Her lipase is normal.  Urinalysis has 6-9 white cells. It will reflex to culture, she has no  symptoms, I am going to wait for culture results to determine if treatment is necessary. Her CT shows a normal appendix. She has a 2.1 cm simple right ovarian cyst.  He has minimal tenderness in her right lower abdomen. Her exam is not typical for torsion, I have a low index of suspicion for torsion. She is not pregnant, I do not think she has an ectopic pregnancy. Her pain is unilateral, she has no vaginal discharge by history, I do not think she has PID. She has no significant hematuria, no evidence of ureteral stone on CT. No flank pain, I do not think she has pyelonephritis. Think her pain is likely related to her ovarian cyst.  She has a gynecologist.  I recommended follow-up in a week if she has continued symptoms. Naproxen or ibuprofen as needed for pain. I discussed with her the signs and symptoms that should prompt her return including but not limited to severe abdominal pain, dizziness, passing out. I did tell her that occasionally ovarian cyst can rupture and bleed significantly. Test results, diagnosis, and treatment plan were discussed with the patient.   She understands the treatment plan and follow-up as discussed. CONSULTS:  None    PROCEDURES:  None    FINAL IMPRESSION      1.  Right ovarian cyst          DISPOSITION/PLAN   DISPOSITION Decision To Discharge 01/19/2021 08:21:57 PM      PATIENT REFERRED TO:  Stephenie Diaz MD  [address] [place] [address] Road  [phone]    In 1 week        DISCHARGE MEDICATIONS:  New Prescriptions    No medications on file       (Please note that portions of this note were completed with a voice recognition program.  Efforts were made to edit the dictations but occasionally words are mis-transcribed.)    Toney Waterman MD  Attending Emergency Physician        Abdoul Diez MD  01/19/21 2027

## 2021-01-20 NOTE — ED NOTES
Gave patient discharge instructions. She states understanding.  Patient discharged to home      Nicole Tolbert RN  01/19/21 2049

## 2021-08-23 ENCOUNTER — OFFICE VISIT (OUTPATIENT)
Dept: SLEEP MEDICINE | Age: 31
End: 2021-08-23
Payer: COMMERCIAL

## 2021-08-23 VITALS
WEIGHT: 169 LBS | TEMPERATURE: 98.2 F | SYSTOLIC BLOOD PRESSURE: 104 MMHG | HEIGHT: 61 IN | DIASTOLIC BLOOD PRESSURE: 82 MMHG | OXYGEN SATURATION: 94 % | RESPIRATION RATE: 14 BRPM | BODY MASS INDEX: 31.91 KG/M2 | HEART RATE: 102 BPM

## 2021-08-23 DIAGNOSIS — G47.10 HYPERSOMNOLENCE: Primary | ICD-10-CM

## 2021-08-23 DIAGNOSIS — G47.419 NARCOLEPSY WITHOUT CATAPLEXY: ICD-10-CM

## 2021-08-23 PROCEDURE — 99214 OFFICE O/P EST MOD 30 MIN: CPT | Performed by: PSYCHIATRY & NEUROLOGY

## 2021-08-23 ASSESSMENT — SLEEP AND FATIGUE QUESTIONNAIRES
HOW LIKELY ARE YOU TO NOD OFF OR FALL ASLEEP WHILE SITTING INACTIVE IN A PUBLIC PLACE: 2
HOW LIKELY ARE YOU TO NOD OFF OR FALL ASLEEP WHILE SITTING QUIETLY AFTER LUNCH WITHOUT ALCOHOL: 2
HOW LIKELY ARE YOU TO NOD OFF OR FALL ASLEEP WHILE SITTING AND READING: 3
ESS TOTAL SCORE: 18
HOW LIKELY ARE YOU TO NOD OFF OR FALL ASLEEP WHILE WATCHING TV: 3
HOW LIKELY ARE YOU TO NOD OFF OR FALL ASLEEP WHEN YOU ARE A PASSENGER IN A CAR FOR AN HOUR WITHOUT A BREAK: 3
HOW LIKELY ARE YOU TO NOD OFF OR FALL ASLEEP IN A CAR, WHILE STOPPED FOR A FEW MINUTES IN TRAFFIC: 1
HOW LIKELY ARE YOU TO NOD OFF OR FALL ASLEEP WHILE SITTING AND TALKING TO SOMEONE: 1
HOW LIKELY ARE YOU TO NOD OFF OR FALL ASLEEP WHILE LYING DOWN TO REST IN THE AFTERNOON WHEN CIRCUMSTANCES PERMIT: 3

## 2021-08-23 NOTE — PATIENT INSTRUCTIONS
Patient Education        Narcolepsy: Care Instructions  Your Care Instructions  Everybody gets a little sleepy once in a while, during a long car ride or other times when you want to be alert. But some people cannot control their sleepiness. It is no fun to be in the middle of your workday or driving your car down the street and have an overwhelming desire to sleep. This condition is called narcolepsy. Doctors do not know what causes narcolepsy. Your doctor may ask you to keep a sleep diary for a couple of weeks. It will help you and your doctor decide on treatment. It often helps to take limited naps during the day. And these things might help you sleep better at night: create a good place to sleep, do things that help your mood before you go to bed, and keep a consistent sleep schedule. Your doctor may recommend medicine to help you stay awake during the day or sleep at night. Follow-up care is a key part of your treatment and safety. Be sure to make and go to all appointments, and call your doctor if you are having problems. It's also a good idea to know your test results and keep a list of the medicines you take. How can you care for yourself at home? · Try to take 2 or 3 short naps at regular times during the day. After a nap, always give yourself time to become alert before you drive a car or do anything that might cause an accident. · Take your medicines exactly as prescribed. Call your doctor if you think you are having a problem with your medicine. You may need to try several medicines before you find the one that works best for you. · Try to improve your nighttime sleep habits. Here are a few of the things you could do:  ? Go to bed only when you are sleepy, and get up at the same time every day, even if you do not feel rested.  This might help you sleep well the next night and the night after that.  ? If you lie awake for longer than 15 minutes, get up, leave the bedroom, and do something quiet, such as read, until you feel sleepy again. ? Avoid drinking or eating anything with caffeine after 3 p.m. This includes coffee, tea, cola drinks, and chocolate. ? Make sure your bedroom is not too hot or too cold, and keep it quiet and dark. ? Make sure your mattress provides good support. · Be kind to your body:  ? Relieve tension with exercise or a massage. ? Learn and do relaxation techniques. ? Avoid alcohol, caffeine, nicotine, and illegal drugs. They can increase your anxiety level and cause sleep problems. · Get light exercise daily. Gentle stretching, light aerobics, swimming, walking, and riding a bicycle can help to keep you going during the day and to sleep well at night. · Eat a healthy diet. You may feel better if you avoid heavy meals and eat more fruits and vegetables. · Do not use over-the-counter sleeping pills. They can make your sleep restless. · Ask your doctor if any medicines you take could cause sleepiness. For example, cold and allergy medicines can make you drowsy. · Consider joining a support group with people who have narcolepsy or other sleep problems. These groups can be a good source of tips for what to do. Also, it can be comforting to talk to people who face similar challenges. Your doctor can tell you how to contact a support group. When should you call for help? Call your doctor now or seek immediate medical care if:    · You passed out (lost consciousness).     · You cannot use your muscles. This may happen very briefly, sometimes after you laugh or are angry, and may only affect part of your body. Watch closely for changes in your health, and be sure to contact your doctor if:    · Your sleepiness continues to get worse. Where can you learn more? Go to https://delfino.[employer]. org and sign in to your elarm account. Enter Y401 in the DeskGod box to learn more about \"Narcolepsy: Care Instructions. \"     If you do not have an account, please click on the \"Sign Up Now\" link. Current as of: October 26, 2020               Content Version: 12.9  © 2006-2021 Healthwise, Incorporated. Care instructions adapted under license by [facility] ([facility]). If you have questions about a medical condition or this instruction, always ask your healthcare professional. [address] any warranty or liability for your use of this information.

## 2021-08-23 NOTE — PROGRESS NOTES
MD TRISHA Ortiz Board Certified in Sleep Medicine  Certified in [address] [place] [address] Certified in Neurology [address]  [address] [facility] [address] [place],  [address]  [phone]   [address] [place] [address], [address] [place], [place] [address]e Ne                      [address]  [address] [place] [address] [phone]    Subjective:     Patient ID: Kush Hensley is a 28 y.o. female. Chief Complaint   Patient presents with    Follow-up       HPI:        Kush Hensley is a 28 y.o. female was seen today as a follow for obstructive sleep apnea. The patient underwent home sleep testing on 10/14/2020, the overnight registration revealed mild obstructive sleep apnea with apnea hypopnea index of 7.4/hr with lowest O2 saturation of 91%, patient spent about 0 minutes below 90%. Has not tried the APAP  The Patient scored Total score: 18 on [place] Sleepiness Scale ( more than 10 is indicative of daytime sleepiness)     No cataplexy , sleep paralysis. Sleep 10 hours a night plus 1-2 naps/day for 60 minutes each. Can not quit the Prozac nor the Abilify. DOT/CDL - N/A        Previous Report(s)Reviewed: historical medical records         Social History     Socioeconomic History    Marital status:      Spouse name: Not on file    Number of children: Not on file    Years of education: Not on file    Highest education level: Not on file   Occupational History    Not on file   Tobacco Use    Smoking status: Current Every Day Smoker     Packs/day: 0.25     Types: Cigarettes    Smokeless tobacco: Never Used    Tobacco comment: did quit but started again   Vaping Use    Vaping Use: Never used   Substance and Sexual Activity    Alcohol use:  Yes     Alcohol/week: 1.0 standard drinks     Types: 1 Glasses of wine per week     Comment: twice a form    Drug use: No    Sexual activity: Yes     Partners: Male   Other Topics Concern    Not on file   Social History Narrative    Not on file     Social Determinants of Health     Financial Resource Strain:     Difficulty of Paying Living Expenses:    Food Insecurity:     Worried About Running Out of Food in the Last Year:     [address] in the Last Year:    Transportation Needs:     Lack of Transportation (Medical):  Lack of Transportation (Non-Medical):    Physical Activity:     Days of Exercise per Week:     Minutes of Exercise per Session:    Stress:     Feeling of Stress :    Social Connections:     Frequency of Communication with Friends and Family:     Frequency of Social Gatherings with Friends and Family:     Attends Congregation Services:     Active Member of Clubs or Organizations:     Attends Club or Organization Meetings:     Marital Status:    Intimate Partner Violence:     Fear of Current or Ex-Partner:     Emotionally Abused:     Physically Abused:     Sexually Abused:        Prior to Admission medications    Medication Sig Start Date End Date Taking?  Authorizing Provider   Biotin w/ Vitamins C & E (HAIR/SKIN/NAILS PO) Take by mouth   Yes Historical Provider, MD   ARIPiprazole (ABILIFY) 5 MG tablet Take 2 tablets by mouth nightly 10/1/20  Yes Brina Jasso MD   FLUoxetine (PROZAC) 40 MG capsule Take 1 capsule by mouth daily 9/18/20 9/18/21 Yes Brina Jasso MD   ibuprofen (ADVIL;MOTRIN) 600 MG tablet Take 1 tablet by mouth every 6 hours as needed for Pain 1/28/19  Yes Edson Mena MD   Fexofenadine-Pseudoephedrine (ALLEGRA-D 24 HOUR PO) Take 1 tablet by mouth once as needed   Yes Historical Provider, MD   brexpiprazole (REXULTI) 1 MG TABS tablet Take 1 tablet by mouth daily  Patient taking differently: Take 1 mg by mouth daily Has not started this medication because of the expense 10/26/20 1/24/21  SAURAV Murphy - CNP   propranolol (INDERAL) 10 MG tablet Take 1 tablet by mouth 3 times daily as needed (anxiety)  Patient not taking: Reported on 8/23/2021 10/26/20   SAURAV Smith - CNP       Allergies as of 08/23/2021 - Fully Reviewed 08/23/2021   Allergen Reaction Noted    Depo-provera [medroxyprogesterone acetate]  08/04/2011    Promethazine Nausea Only 01/24/2014       Patient Active Problem List   Diagnosis    Allergic rhinitis, seasonal    Migraine    Recurrent major depressive disorder, in full remission ([facility] [address]    Dysfunctional uterine bleeding    Other congenital anomaly of lower limb, including pelvic girdle    Congenital anomaly of upper limb    Anxiety    Chronic bilateral low back pain without sciatica    Obstructive sleep apnea       Past Medical History:   Diagnosis Date    Abnormal Pap smear of cervix     will biopsy post partum    Anxiety     Chickenpox     Depression     HX OTHER MEDICAL     mild leg length and hand size discrepancy    IBS (irritable bowel syndrome)     Lactose intolerance     Mental disorder     Depression; not medicated currently    Ovarian cyst        Past Surgical History:   Procedure Laterality Date    TUBAL LIGATION         Family History   Problem Relation Age of Onset    Arthritis Mother     Depression Mother     Arthritis Father     Depression Father     Hearing Loss Sister     Learning Disabilities Brother     Mental Retardation Brother     Cancer Maternal Grandmother     Cancer Paternal Grandfather     Kidney Disease Paternal Grandfather        Review of Systems    Objective:     Vitals:  Weight BMI Neck circumference    Wt Readings from Last 3 Encounters:   08/23/21 169 lb (76.7 kg)   01/19/21 174 lb 13.2 oz (79.3 kg)   10/06/20 177 lb (80.3 kg)    Body mass index is 31.93 kg/m².        BP HR SaO2   BP Readings from Last 3 Encounters:   08/23/21 104/82   01/19/21 124/82   10/06/20 124/88    Pulse Readings from Last 3 Encounters:   08/23/21 102   01/19/21 80   10/06/20 80    SpO2 Readings from Last 3 Encounters: 08/23/21 94%   01/19/21 99%   10/06/20 94%        Themandibular molar Class :   [x]1 []2 []3      Mallampati I Airway Classification:   []1 []2 []3 [x]4      Physical Exam  Vitals and nursing note reviewed. Constitutional:       Appearance: Normal appearance. HENT:      Head: Atraumatic. Mouth/Throat:      Mouth: Mucous membranes are moist.   Eyes:      Extraocular Movements: Extraocular movements intact. Cardiovascular:      Rate and Rhythm: Normal rate and regular rhythm. Pulmonary:      Effort: Pulmonary effort is normal.      Breath sounds: Normal breath sounds. Musculoskeletal:      Cervical back: Normal range of motion. Skin:     General: Skin is warm. Neurological:      General: No focal deficit present. Psychiatric:         Mood and Affect: Mood normal.         :   Idiopathic hypersonic verus narcolepsy  Unlikely this mild ERVIN is causing this severe EDS     Diagnosis Orders   1. Hypersomnolence  Assessment of Daytime Sleepiness    Baseline Diagnostic Sleep Study    Urine Drug Screen   2. Narcolepsy without cataplexy  Assessment of Daytime Sleepiness    Baseline Diagnostic Sleep Study     Plan:     PSG the MSLT, drug screen after the MSLT. Should sleep 8+ a night, anyway currently sleeping over 10-11 hours. Orders Placed This Encounter   Procedures    Urine Drug Screen    Assessment of Daytime Sleepiness    Baseline Diagnostic Sleep Study       Return in about 5 weeks (around 9/27/2021).     Corbin Hobson MD  Medical Director - [facility]

## 2021-09-17 ENCOUNTER — HOSPITAL ENCOUNTER (OUTPATIENT)
Dept: SLEEP CENTER | Age: 31
Discharge: HOME | End: 2021-09-17
Payer: COMMERCIAL

## 2021-09-17 PROCEDURE — U0005 INFEC AGEN DETEC AMPLI PROBE: HCPCS

## 2021-09-17 PROCEDURE — U0003 INFECTIOUS AGENT DETECTION BY NUCLEIC ACID (DNA OR RNA); SEVERE ACUTE RESPIRATORY SYNDROME CORONAVIRUS 2 (SARS-COV-2) (CORONAVIRUS DISEASE [COVID-19]), AMPLIFIED PROBE TECHNIQUE, MAKING USE OF HIGH THROUGHPUT TECHNOLOGIES AS DESCRIBED BY CMS-2020-01-R: HCPCS

## 2021-09-18 LAB — SARS-COV-2: NOT DETECTED

## 2021-09-19 ENCOUNTER — HOSPITAL ENCOUNTER (OUTPATIENT)
Dept: SLEEP CENTER | Age: 31
Discharge: HOME | End: 2021-09-19
Payer: COMMERCIAL

## 2021-09-19 DIAGNOSIS — G47.10 HYPERSOMNOLENCE: ICD-10-CM

## 2021-09-19 DIAGNOSIS — G47.419 NARCOLEPSY WITHOUT CATAPLEXY: ICD-10-CM

## 2021-09-19 PROCEDURE — 95810 POLYSOM 6/> YRS 4/> PARAM: CPT

## 2021-09-20 ENCOUNTER — HOSPITAL ENCOUNTER (OUTPATIENT)
Dept: SLEEP CENTER | Age: 31
Discharge: HOME | End: 2021-09-20
Payer: COMMERCIAL

## 2021-09-20 DIAGNOSIS — G47.10 HYPERSOMNOLENCE: ICD-10-CM

## 2021-09-20 DIAGNOSIS — G47.419 NARCOLEPSY WITHOUT CATAPLEXY: ICD-10-CM

## 2021-09-20 PROCEDURE — 95805 MULTIPLE SLEEP LATENCY TEST: CPT

## 2021-09-21 DIAGNOSIS — G47.11 IDIOPATHIC HYPERSOMNIA: Primary | ICD-10-CM

## 2021-09-21 PROCEDURE — 95810 POLYSOM 6/> YRS 4/> PARAM: CPT | Performed by: PSYCHIATRY & NEUROLOGY

## 2021-09-21 PROCEDURE — 95805 MULTIPLE SLEEP LATENCY TEST: CPT | Performed by: PSYCHIATRY & NEUROLOGY

## 2021-09-21 RX ORDER — ARMODAFINIL 150 MG/1
TABLET ORAL
Qty: 30 TABLET | Refills: 0 | Status: SHIPPED | OUTPATIENT
Start: 2021-09-21 | End: 2021-11-27 | Stop reason: SDUPTHER

## 2021-09-23 LAB
AMPHETAMINE SCREEN, URINE: NORMAL
BARBITURATE SCREEN URINE: NORMAL
BENZODIAZEPINE SCREEN, URINE: NORMAL
CANNABINOID SCREEN URINE: NORMAL
COCAINE METABOLITE SCREEN URINE: NORMAL
Lab: NORMAL
METHADONE SCREEN, URINE: NORMAL
OPIATE SCREEN URINE: NORMAL
OXYCODONE URINE: NORMAL
PH UA: 6
PHENCYCLIDINE SCREEN URINE: NORMAL
PROPOXYPHENE SCREEN: NORMAL

## 2021-09-24 ENCOUNTER — VIRTUAL VISIT (OUTPATIENT)
Dept: PSYCHIATRY | Age: 31
End: 2021-09-24
Payer: COMMERCIAL

## 2021-09-24 DIAGNOSIS — F41.9 ANXIETY: ICD-10-CM

## 2021-09-24 DIAGNOSIS — F33.3 SEVERE EPISODE OF RECURRENT MAJOR DEPRESSIVE DISORDER, WITH PSYCHOTIC FEATURES (HCC): Primary | ICD-10-CM

## 2021-09-24 PROCEDURE — 99215 OFFICE O/P EST HI 40 MIN: CPT | Performed by: NURSE PRACTITIONER

## 2021-09-24 RX ORDER — FLUOXETINE HYDROCHLORIDE 40 MG/1
40 CAPSULE ORAL DAILY
Qty: 30 CAPSULE | Refills: 2 | Status: ON HOLD
Start: 2021-09-24 | End: 2021-10-21 | Stop reason: HOSPADM

## 2021-09-24 RX ORDER — PROPRANOLOL HYDROCHLORIDE 10 MG/1
10 TABLET ORAL 3 TIMES DAILY PRN
Qty: 90 TABLET | Refills: 2 | Status: SHIPPED | OUTPATIENT
Start: 2021-09-24 | End: 2022-03-29 | Stop reason: SDUPTHER

## 2021-09-24 RX ORDER — ZIPRASIDONE HYDROCHLORIDE 20 MG/1
20 CAPSULE ORAL 2 TIMES DAILY WITH MEALS
Qty: 60 CAPSULE | Refills: 1 | Status: SHIPPED | OUTPATIENT
Start: 2021-09-24 | End: 2021-10-14
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
## 2021-10-14 RX ORDER — QUETIAPINE FUMARATE 25 MG/1
25 TABLET, FILM COATED ORAL NIGHTLY
Qty: 30 TABLET | Refills: 1 | Status: SHIPPED | OUTPATIENT
Start: 2021-10-14 | End: 2021-11-27 | Stop reason: SDUPTHER

## 2021-10-14 RX ORDER — QUETIAPINE FUMARATE 25 MG/1
25 TABLET, FILM COATED ORAL NIGHTLY
Qty: 30 TABLET | Refills: 1 | Status: SHIPPED | OUTPATIENT
Start: 2021-10-14 | End: 2021-10-14 | Stop reason: SDUPTHER

## 2021-10-18 ENCOUNTER — HOSPITAL ENCOUNTER (INPATIENT)
Age: 31
LOS: 3 days | Discharge: HOME | DRG: 885 | End: 2021-10-21
Attending: EMERGENCY MEDICINE | Admitting: PSYCHIATRY & NEUROLOGY
Payer: COMMERCIAL

## 2021-10-18 DIAGNOSIS — R45.851 SUICIDAL THOUGHTS: ICD-10-CM

## 2021-10-18 DIAGNOSIS — F39 MOOD DISORDER (HCC): Primary | ICD-10-CM

## 2021-10-18 PROBLEM — F33.9 MAJOR DEPRESSION, RECURRENT (HCC): Status: ACTIVE | Noted: 2021-10-18

## 2021-10-18 LAB
A/G RATIO: 1.3 (ref 1.1–2.2)
ACETAMINOPHEN LEVEL: <5 UG/ML (ref 10–30)
ALBUMIN SERPL-MCNC: 4.4 G/DL (ref 3.4–5)
ALP BLD-CCNC: 96 U/L (ref 40–129)
ALT SERPL-CCNC: 20 U/L (ref 10–40)
AMPHETAMINE SCREEN, URINE: NORMAL
ANION GAP SERPL CALCULATED.3IONS-SCNC: 11 MMOL/L (ref 3–16)
AST SERPL-CCNC: 16 U/L (ref 15–37)
BARBITURATE SCREEN URINE: NORMAL
BASOPHILS ABSOLUTE: 0.1 K/UL (ref 0–0.2)
BASOPHILS RELATIVE PERCENT: 0.8 %
BENZODIAZEPINE SCREEN, URINE: NORMAL
BILIRUB SERPL-MCNC: 0.3 MG/DL (ref 0–1)
BILIRUBIN URINE: NEGATIVE
BLOOD, URINE: NEGATIVE
BUN BLDV-MCNC: 7 MG/DL (ref 7–20)
CALCIUM SERPL-MCNC: 9.4 MG/DL (ref 8.3–10.6)
CANNABINOID SCREEN URINE: NORMAL
CHLORIDE BLD-SCNC: 103 MMOL/L (ref 99–110)
CLARITY: CLEAR
CO2: 22 MMOL/L (ref 21–32)
COCAINE METABOLITE SCREEN URINE: NORMAL
COLOR: YELLOW
CREAT SERPL-MCNC: 0.7 MG/DL (ref 0.6–1.1)
EOSINOPHILS ABSOLUTE: 0.4 K/UL (ref 0–0.6)
EOSINOPHILS RELATIVE PERCENT: 3.7 %
EPITHELIAL CELLS, UA: NORMAL /HPF (ref 0–5)
ETHANOL: NORMAL MG/DL (ref 0–0.08)
GFR AFRICAN AMERICAN: >60
GFR NON-AFRICAN AMERICAN: >60
GLOBULIN: 3.5 G/DL
GLUCOSE BLD-MCNC: 80 MG/DL (ref 70–99)
GLUCOSE URINE: NEGATIVE MG/DL
HCG(URINE) PREGNANCY TEST: NEGATIVE
HCT VFR BLD CALC: 38.8 % (ref 36–48)
HEMOGLOBIN: 12.7 G/DL (ref 12–16)
INFLUENZA A: NOT DETECTED
INFLUENZA B: NOT DETECTED
KETONES, URINE: NEGATIVE MG/DL
LEUKOCYTE ESTERASE, URINE: ABNORMAL
LYMPHOCYTES ABSOLUTE: 1.9 K/UL (ref 1–5.1)
LYMPHOCYTES RELATIVE PERCENT: 19.4 %
Lab: NORMAL
MCH RBC QN AUTO: 25.9 PG (ref 26–34)
MCHC RBC AUTO-ENTMCNC: 32.8 G/DL (ref 31–36)
MCV RBC AUTO: 78.8 FL (ref 80–100)
METHADONE SCREEN, URINE: NORMAL
MICROSCOPIC EXAMINATION: YES
MONOCYTES ABSOLUTE: 0.8 K/UL (ref 0–1.3)
MONOCYTES RELATIVE PERCENT: 8.5 %
NEUTROPHILS ABSOLUTE: 6.5 K/UL (ref 1.7–7.7)
NEUTROPHILS RELATIVE PERCENT: 67.6 %
NITRITE, URINE: NEGATIVE
OPIATE SCREEN URINE: NORMAL
OXYCODONE URINE: NORMAL
PDW BLD-RTO: 16.6 % (ref 12.4–15.4)
PH UA: 6.5
PH UA: 6.5 (ref 5–8)
PHENCYCLIDINE SCREEN URINE: NORMAL
PLATELET # BLD: 362 K/UL (ref 135–450)
PMV BLD AUTO: 9 FL (ref 5–10.5)
POTASSIUM SERPL-SCNC: 3.9 MMOL/L (ref 3.5–5.1)
PROPOXYPHENE SCREEN: NORMAL
PROTEIN UA: NEGATIVE MG/DL
RBC # BLD: 4.92 M/UL (ref 4–5.2)
RBC UA: NORMAL /HPF (ref 0–4)
SALICYLATE, SERUM: <0.3 MG/DL (ref 15–30)
SARS-COV-2 RNA, RT PCR: NOT DETECTED
SODIUM BLD-SCNC: 136 MMOL/L (ref 136–145)
SPECIFIC GRAVITY UA: <=1.005 (ref 1–1.03)
TOTAL PROTEIN: 7.9 G/DL (ref 6.4–8.2)
URINE TYPE: ABNORMAL
UROBILINOGEN, URINE: 0.2 E.U./DL
WBC # BLD: 9.6 K/UL (ref 4–11)
WBC UA: NORMAL /HPF (ref 0–5)

## 2021-10-18 PROCEDURE — 81001 URINALYSIS AUTO W/SCOPE: CPT

## 2021-10-18 PROCEDURE — 1240000000 HC EMOTIONAL WELLNESS R&B

## 2021-10-18 PROCEDURE — 87636 SARSCOV2 & INF A&B AMP PRB: CPT

## 2021-10-18 PROCEDURE — 80179 DRUG ASSAY SALICYLATE: CPT

## 2021-10-18 PROCEDURE — 82077 ASSAY SPEC XCP UR&BREATH IA: CPT

## 2021-10-18 PROCEDURE — 80143 DRUG ASSAY ACETAMINOPHEN: CPT

## 2021-10-18 PROCEDURE — 85025 COMPLETE CBC W/AUTO DIFF WBC: CPT

## 2021-10-18 PROCEDURE — 84703 CHORIONIC GONADOTROPIN ASSAY: CPT

## 2021-10-18 PROCEDURE — 80307 DRUG TEST PRSMV CHEM ANLYZR: CPT

## 2021-10-18 PROCEDURE — 80053 COMPREHEN METABOLIC PANEL: CPT

## 2021-10-18 PROCEDURE — 99284 EMERGENCY DEPT VISIT MOD MDM: CPT

## 2021-10-18 RX ORDER — LORAZEPAM 1 MG/1
1 TABLET ORAL EVERY 6 HOURS PRN
Status: DISCONTINUED | OUTPATIENT
Start: 2021-10-18 | End: 2021-10-21 | Stop reason: HOSPADM

## 2021-10-18 RX ORDER — ACETAMINOPHEN 325 MG/1
650 TABLET ORAL EVERY 4 HOURS PRN
Status: DISCONTINUED | OUTPATIENT
Start: 2021-10-18 | End: 2021-10-21 | Stop reason: HOSPADM

## 2021-10-18 ASSESSMENT — ENCOUNTER SYMPTOMS
SHORTNESS OF BREATH: 0
COLOR CHANGE: 0
NAUSEA: 0
ABDOMINAL PAIN: 0
WHEEZING: 0
VOICE CHANGE: 0
STRIDOR: 0
FACIAL SWELLING: 0
TROUBLE SWALLOWING: 0
VOMITING: 0

## 2021-10-18 ASSESSMENT — SLEEP AND FATIGUE QUESTIONNAIRES
AVERAGE NUMBER OF SLEEP HOURS: 10
DO YOU HAVE DIFFICULTY SLEEPING: NO
DO YOU USE A SLEEP AID: NO

## 2021-10-18 ASSESSMENT — LIFESTYLE VARIABLES: HISTORY_ALCOHOL_USE: NO

## 2021-10-18 ASSESSMENT — PATIENT HEALTH QUESTIONNAIRE - PHQ9: SUM OF ALL RESPONSES TO PHQ QUESTIONS 1-9: 27

## 2021-10-18 NOTE — ED NOTES
Presenting Problem:Patient presents to the ED voluntarily after having suicidal thoughts with plan. Patient was clinically sober at the time of the evaluation. Patient states she was diagnoses with anxiety and depression in her teens. Then in 2016 her PCP told her he thought she may have stress induced schizophrenia. She states she does have AV/H. She started having suicidal ideations a few months ago and sought the help of a psychiatrist.  She then started taking abilify, which made her manic, this was discontinued and she was started on Geodon. She felt this is when thing started to spiral downhill faster. She told the doctor and last Thursday the Geodon was changed to Seroquel. She was instructed to let her psychiatrist know Monday how she was feeling. She states she does not feel any better and continues to have the suicidal thoughts. She does not want to wait til they are so bad that she cant control them. She states that she feels overwhelmed and out of place. She states that she feels disconnected from her family and kids. She can look at her children and she does not have have the warm loving feeling that she always had before. Appearance/Hygiene:  hospital attire, fair grooming and fair hygiene   Motor Behavior: WNL   Attitude: cooperative  Affect: flat affect   Speech: soft  Mood: anxious   Thought Processes: [place]  Perceptions:  Patient states she does see things. Normally it is mice running across the floor or sometimes a person standing somewhere. This happens daily. She also hears things. She will hear a voice. Sometimes it sounds like it is in the next room and sometimes it sound like it is  Someone standing right next to her. This happens a couple times a week.    Thought content: Clear   Orientation: A&Ox4   Memory: intact  Concentration: Fair    Insight/ judgement: normal insight and judgment      Psychosocial and contextual factors: Pt lives with her  and 3 children. C-SSRS flowsheet is  Complete. Psychiatric History (including current outpatient provider and past inpatient admissions): Yes, for years patient got meds from her PCP. Recently started seeing psychiatrist Sonia Tracy. Has only seen her a couple times.     Access to Firearms: None    ASSESSMENT FOR IMMINENT FUTURE DANGER:    RISK FACTORS:    []  Age <25 or >49   []  Male gender   [x]  Depressed mood   [x]  Active suicidal ideation   [x]  Suicide plan   []  Suicide attempt   [x]  Access to lethal means   []  Prior suicide attempt   []  Active substance abuse    [x]  Highly impulsive behaviors   []  Not attending to self-care/ADLs    []  Recent significant loss   []  Chronic pain or medical illness   []  Social isolation   []  History of violence    []  Active psychosis   []  Cognitive impairment    []  No outpatient services in place   []  Medication noncompliance   [x]  No collateral information to support safety  [] Self- injurious/ Self-harm behavior    PROTECTIVE FACTORS:  [x] Age >25 and <55  [x] Female gender   [] Denies depression  [] Denies suicidal ideation  [] Does not have lethal plan   [] Does not have access to guns or weapons  [] Patient is verbally gila for safety  [x] No prior suicide attempts  [x] No active substance abuse  [x] Patient has social or family support  [x] No active psychosis or cognitive dysfunction  [x] Physically healthy  [x] Has outpatient services in place  [x] Compliant with recommended medications           Mahad Hartley RN  10/18/21 1946

## 2021-10-18 NOTE — Clinical Note
Patient Class: Inpatient [101]   REQUIRED: Diagnosis: Major depression, recurrent ([facility] [918180]   Estimated Length of Stay: Estimated stay of more than 2 midnights   Admitting Provider: Kellen Palma [[id number]]   Telemetry/Cardiac Monitoring Required?: No
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
## 2021-10-18 NOTE — ED NOTES
Report given to [facility] RN to update [facility].       Cristal Wade RN  10/18/21 Magdaleno Fowler RN  10/18/21 1923

## 2021-10-18 NOTE — ED NOTES
Patient brought back from triage. Patient changed into safety clothing and belongings locked into locker. Patient oriented to [facility] and placed into room 4.       Darren Junior RN  10/18/21 [id number]

## 2021-10-18 NOTE — ED PROVIDER NOTES
[facility]  eMERGENCY dEPARTMENT eNCOUnter      Pt Name: Andres Conti  MRN: [id number]  [place] 1990  Date of evaluation: 10/18/2021  Provider: Ashwin Swann MD    CHIEF COMPLAINT       Chief Complaint   Patient presents with    Psychiatric Evaluation     Pt states that her psychiatrist changed her meds about 3 weeks ago. pt feels withdrawn, SI          HISTORY OF PRESENT ILLNESS   (Location/Symptom, Timing/Onset, Context/Setting, Quality, Duration, Modifying Factors, Severity)  Note limiting factors. Andres Conti is a 28 y.o. female with hx of depression and anxiety who reports that her psychiatrist changed her medications 3 weeks ago and now she feels withdrawn and occasional suicidal ideation. The patient reports that she does not feel any emotion at all even when she is spending time with loved ones and this concerns her. Patient also reports that she has had some suicidal thoughts including taking too much of her medication. Patient denies any current plan to kill herself or any attempts or ingestions prior to coming to the ER. She reports her symptoms are severe, constant, and worsening. Patient denies any fever, chest pain, shortness breath, or medical symptoms at this time. HPI    Nursing Notes were reviewed. REVIEW OFSYSTEMS    (2-9 systems for level 4, 10 or more for level 5)     Review of Systems   Constitutional: Negative for appetite change, fever and unexpected weight change. HENT: Negative for facial swelling, trouble swallowing and voice change. Eyes: Negative for visual disturbance. Respiratory: Negative for shortness of breath, wheezing and stridor. Cardiovascular: Negative for chest pain and palpitations. Gastrointestinal: Negative for abdominal pain, nausea and vomiting. Genitourinary: Negative for dysuria and vaginal bleeding. Musculoskeletal: Negative for neck pain and neck stiffness. Skin: Negative for color change and wound.    Neurological: Brother     Mental Retardation Brother     Cancer Maternal Grandmother     Cancer Paternal Grandfather     Kidney Disease Paternal Grandfather           SOCIAL HISTORY       Social History     Socioeconomic History    Marital status:      Spouse name: None    Number of children: None    Years of education: None    Highest education level: None   Occupational History    None   Tobacco Use    Smoking status: Current Every Day Smoker     Packs/day: 0.25     Types: Cigarettes    Smokeless tobacco: Never Used    Tobacco comment: did quit but started again   Vaping Use    Vaping Use: Never used   Substance and Sexual Activity    Alcohol use: Yes     Alcohol/week: 1.0 standard drinks     Types: 1 Glasses of wine per week     Comment: occass last drink over a month    Drug use: Yes     Types: Marijuana     Comment: couple weeks ago    Sexual activity: Yes     Partners: Male   Other Topics Concern    None   Social History Narrative    None     Social Determinants of Health     Financial Resource Strain:     Difficulty of Paying Living Expenses:    Food Insecurity:     Worried About Running Out of Food in the Last Year:     Ran Out of Food in the Last Year:    Transportation Needs:     Lack of Transportation (Medical):      Lack of Transportation (Non-Medical):    Physical Activity:     Days of Exercise per Week:     Minutes of Exercise per Session:    Stress:     Feeling of Stress :    Social Connections:     Frequency of Communication with Friends and Family:     Frequency of Social Gatherings with Friends and Family:     Attends Judaism Services:     Active Member of Clubs or Organizations:     Attends Club or Organization Meetings:     Marital Status:    Intimate Partner Violence:     Fear of Current or Ex-Partner:     Emotionally Abused:     Physically Abused:     Sexually Abused:          PHYSICAL EXAM    (up to 7 for level 4, 8 or more for level 5)     ED Triage Vitals [10/18/21 1739]   BP Temp Temp Source Pulse Resp SpO2 Height Weight   (!) 148/99 97.7 °F (36.5 °C) Oral 87 18 98 % 5' 1\" (1.549 m) 165 lb (74.8 kg)       Physical Exam  Vitals and nursing note reviewed. Constitutional:       Appearance: She is well-developed. She is not diaphoretic. HENT:      Head: Normocephalic and atraumatic. Right Ear: External ear normal.      Left Ear: External ear normal.   Eyes:      Conjunctiva/sclera: Conjunctivae normal.   Neck:      Vascular: No JVD. Trachea: No tracheal deviation. Cardiovascular:      Rate and Rhythm: Normal rate. Pulmonary:      Effort: Pulmonary effort is normal. No respiratory distress. Breath sounds: Normal breath sounds. No wheezing. Abdominal:      General: There is no distension. Palpations: Abdomen is soft. Tenderness: There is no abdominal tenderness. There is no guarding or rebound. Musculoskeletal:         General: No tenderness or deformity. Normal range of motion. Cervical back: Neck supple. Skin:     General: Skin is warm and dry. Neurological:      General: No focal deficit present. Mental Status: She is alert and oriented to person, place, and time. Cranial Nerves: No cranial nerve deficit. Comments: Normal speech and mental status. Normal gait on own power. Psychiatric:      Comments: Flat affect. Patient does report suicidal ideations but denies any current suicidal plan.   Denies any HI.         DIAGNOSTIC RESULTS       LABS:  Labs Reviewed   ACETAMINOPHEN LEVEL - Abnormal; Notable for the following components:       Result Value    Acetaminophen Level <5 (*)     All other components within normal limits    Narrative:     Performed at:  [facility] [address],  [place], [facility]   Phone [phone]   CBC WITH AUTO DIFFERENTIAL - Abnormal; Notable for the following components:    MCV 78.8 (*)     MCH 25.9 (*)     RDW 16.6 (*)     All other 133/95   Pulse: 87 83   Resp: 18 18   Temp: 97.7 °F (36.5 °C) 98.6 °F (37 °C)   TempSrc: Oral Temporal   SpO2: 98% 97%   Weight: 165 lb (74.8 kg)    Height: 5' 1\" (1.549 m)          MDM  Patient denies any attempts of self-harm or medical complaints at this time. Screening labs unremarkable. Feel patient is medically clear at this time and appropriate for psychiatry evaluation. Patient is admitted by the psychiatric service for further care. CONSULTS:  Psychiatry     PROCEDURES:  Unless otherwise noted below, none     Procedures    FINAL IMPRESSION      1. Mood disorder ([facility] [address]    2. Suicidal thoughts          DISPOSITION/PLAN   DISPOSITION Admitted 10/18/2021 08:44:31 PM      (Please note that portions of this note were completed with a voice recognition program.  Efforts were made to edit the dictations but occasionally words aremis-transcribed. )    Clarita Runner, MD (electronically signed)  Attending Emergency Physician           Clarita Runner, MD  10/18/21 0003

## 2021-10-19 PROCEDURE — 99223 1ST HOSP IP/OBS HIGH 75: CPT | Performed by: PSYCHIATRY & NEUROLOGY

## 2021-10-19 PROCEDURE — 99222 1ST HOSP IP/OBS MODERATE 55: CPT | Performed by: PHYSICIAN ASSISTANT

## 2021-10-19 PROCEDURE — 6370000000 HC RX 637 (ALT 250 FOR IP): Performed by: PSYCHIATRY & NEUROLOGY

## 2021-10-19 PROCEDURE — 6370000000 HC RX 637 (ALT 250 FOR IP): Performed by: PHYSICIAN ASSISTANT

## 2021-10-19 PROCEDURE — 1240000000 HC EMOTIONAL WELLNESS R&B

## 2021-10-19 RX ORDER — NICOTINE 21 MG/24HR
1 PATCH, TRANSDERMAL 24 HOURS TRANSDERMAL DAILY
Status: DISCONTINUED | OUTPATIENT
Start: 2021-10-19 | End: 2021-10-21 | Stop reason: HOSPADM

## 2021-10-19 RX ORDER — FLUOXETINE HYDROCHLORIDE 20 MG/1
60 CAPSULE ORAL DAILY
Status: DISCONTINUED | OUTPATIENT
Start: 2021-10-19 | End: 2021-10-21 | Stop reason: HOSPADM

## 2021-10-19 RX ORDER — QUETIAPINE FUMARATE 25 MG/1
25 TABLET, FILM COATED ORAL NIGHTLY
Status: DISCONTINUED | OUTPATIENT
Start: 2021-10-19 | End: 2021-10-21 | Stop reason: HOSPADM

## 2021-10-19 RX ORDER — LAMOTRIGINE 25 MG/1
50 TABLET ORAL DAILY
Status: DISCONTINUED | OUTPATIENT
Start: 2021-10-19 | End: 2021-10-21 | Stop reason: HOSPADM

## 2021-10-19 RX ADMIN — QUETIAPINE FUMARATE 25 MG: 25 TABLET ORAL at 22:06

## 2021-10-19 RX ADMIN — LAMOTRIGINE 50 MG: 25 TABLET ORAL at 13:54

## 2021-10-19 RX ADMIN — FLUOXETINE 60 MG: 20 CAPSULE ORAL at 13:54

## 2021-10-19 ASSESSMENT — SLEEP AND FATIGUE QUESTIONNAIRES
RESTFUL SLEEP: YES
DO YOU USE A SLEEP AID: YES
DIFFICULTY FALLING ASLEEP: NO
DIFFICULTY ARISING: YES
DO YOU HAVE DIFFICULTY SLEEPING: YES
AVERAGE NUMBER OF SLEEP HOURS: 10
DIFFICULTY STAYING ASLEEP: YES

## 2021-10-19 ASSESSMENT — PATIENT HEALTH QUESTIONNAIRE - PHQ9: SUM OF ALL RESPONSES TO PHQ QUESTIONS 1-9: 27

## 2021-10-19 ASSESSMENT — LIFESTYLE VARIABLES: HISTORY_ALCOHOL_USE: NO

## 2021-10-19 NOTE — ED NOTES
Level of Care Disposition: Admit    Patient was seen by ED provider and [facility] staff. The case presented to psychiatric provider on-call Dr. Param Patterson. Based on the ED evaluation and information presented to the provider by Emmy Osborn, it is the recommendation of the on call psychiatric provider that inpatient hospitalization is the least restrictive environment for the patient at this time. The patient will be admitted to the inpatient unit. Admitting provider did not order suicide precautions based on the unit is safe and secure. RATIONALE FOR ADMISSION:   Patient at imminent risk of danger to self as demonstrated by the patient verbalizing her desire to commit suicide with having a plan.     Insurance Pre certification Authorization: JOBY Galicia RN  10/18/21 2039

## 2021-10-19 NOTE — FLOWSHEET NOTE
10/19/21 0835   Psychiatric History   Psychiatric history treatment Current treatment   Contact information Eusebio Sue    Are there any medication issues? Yes  (medication was changed on Friday by Dr. Sharyne Apley. not sure if working since it was just changed. had side effects on geadon and abilify)   Support System   Support system Adequate   Types of Support System Spouse; Mother;Sister   Problems in support system Other (Comment)  (mom has her own issues)   Current Living Situation   Home Living Adequate   Living information Lives with others  (pt,  and 3 kids ages 6, 9 and 2)   Problems with living situation  No   Lack of basic needs No   SSDI/SSI none   Other government assistance foods stamps were just taken away   Problems with environment none   Current abuse issues none   Supervised setting Family supervision  (lives with )   Relationship problems Yes   Relationship problems due to  Divorce/Separation  (pt and  were  at beginning of year and have been back together since July. much better now and attending couples counseling)   Medical and Self-Care Issues   Relevant medical problems sciolosis, fybramyalgia   Relevant self-care issues not been caring or wanting to take care of self for about 1 month   Barriers to treatment No  (pt going to couples counseling. pt will be doing individual as well)   Family Constellation   Spouse/partner-name/age Aguila   Children-names/ages 3 children ages 6, 9 and 2   Parents bio mom is .  raised by dad and step mom   Siblings 1 brother and 3 sisters   Support services Agency involved(Comment)   Comment [facility] counseling at [facility]   Childhood   Raised by Biological father  (dad and step mom)   Biological father Gris Pereira   Relevant family history depression and anxiety on both sides of the family   History of abuse No   Legal History   Legal history No   Other relevant legal issues none   Juvenile legal history No    Abuse Assessment   Physical Abuse Denies   Verbal Abuse Denies   Emotional abuse Denies   Financial Abuse Denies   Sexual abuse Denies   Elder abuse No   Substance Use   Use of substances  Yes  (marijuana occassionally and alcohol very seldomly)   Motivation for SA Treatment   Motivation for treatment No  (pt does not feel marijuana is an issue due to only using occassionally)   Comment pt does not feel is an issue   Education   Education HS graduate -GED  (couple of quarters in college)   Special education   (none)   Work History   Currently employed Yes  ([employer])   Recent job loss or change   (recently changed jobs)    service   (none)   /[facility] involvement none   Leisure/Activity   Past interests crafting, color, video games, painting   Present interests same   Current daily activity work 5 days a week   Social with friends/family No  (not recently)   Cultural and Spiritual   Spiritual concerns No   Cultural concerns No

## 2021-10-19 NOTE — GROUP NOTE
Group Therapy Note    Date: 10/19/2021    Group Start Time: 1000  Group End Time: 6610  Group Topic: Psychoeducation    [address]        Group Therapy Note    Attendees: 6         Patient's Goal: to learn and discuss healthy support systems. Pt's were asked to identify their support systems and given other resources to use for a support system. Notes:  Pt participated in group discussion and filled out her worksheet with her support system. Pt feels that she has a good support system. Status After Intervention:  Improved    Participation Level:  Active Listener and Interactive    Participation Quality: Appropriate and Attentive      Speech:  hesitant      Thought Process/Content: Logical      Affective Functioning: Flat      Mood: depressed      Level of consciousness:  Alert, Oriented x4 and Attentive      Response to Learning: Able to verbalize current knowledge/experience, Able to verbalize/acknowledge new learning and Progressing to goal      Endings: None Reported    Modes of Intervention: Education, Support, Socialization, Exploration, Clarifying and Activity      Discipline Responsible: /Counselor      Signature:  PATO Hayes

## 2021-10-19 NOTE — PLAN OF CARE
Problem: Altered Mood, Depressive Behavior:  Goal: Able to verbalize acceptance of life and situations over which he or she has no control  Description: Able to verbalize acceptance of life and situations over which he or she has no control  Outcome: Ongoing  Goal: Able to verbalize and/or display a decrease in depressive symptoms  Description: Able to verbalize and/or display a decrease in depressive symptoms  Outcome: Ongoing  Goal: Ability to disclose and discuss suicidal ideas will improve  Description: Ability to disclose and discuss suicidal ideas will improve  Outcome: Ongoing  Pt pleasantly cooperative, attending groups and compliant with all care. Pt states she began feeling like she was a burden to her family and did not want to continue living. She had been going through medication changes and feels this worsened her situation. States she began feeling increasingly suicidal;  apathetic and detached from her life, family, etc.  Very depressed, no motivation. States she sleeps easily but wakes often throughout night and doesn't feel rested in the morning. Pt states her appetite has been poor but since having started taking Seroquel it has increased. Pt states she sees things-looks like a mouse running across the floor and sometimes sees the shadow of a person out of her periphery.

## 2021-10-19 NOTE — BH NOTE
[facility]  Admission Note     Admission Type:   Admission Type: Voluntary    Reason for admission:  Reason for Admission: Suicidal ideation with plan and intent. PATIENT STRENGTHS:  Strengths: Communication    Patient Strengths and Limitations:  Strengths: Demonstrates basic social skills, Independent in basic self-care activities  Limitations: Difficult relationships / poor social skills, Tendency to isolate self    Addictive Behavior:   Addictive Behavior  In the past 3 months, have you felt or has someone told you that you have a problem with:  : Other(Comments) (use phone to isolate)  Do you have a history of Chemical Use?: No  Do you have a history of Alcohol Use?: No  Do you have a history of Street Drug Abuse?: Yes (THC)    Medical Problems:   Past Medical History:   Diagnosis Date    Abnormal Pap smear of cervix     will biopsy post partum    Anxiety     Chickenpox     Depression     HX OTHER MEDICAL     mild leg length and hand size discrepancy    Hypersomnia     IBS (irritable bowel syndrome)     Lactose intolerance     Mental disorder     Depression; not medicated currently    Ovarian cyst        Status EXAM:  Status and Exam  Normal: No  Facial Expression: Expressionless, Flat  Affect: Stable  Level of Consciousness: Alert  Mood:Normal: No  Mood: Despair, Empty, Helpless  Motor Activity:Normal: No  Motor Activity: Decreased  Interview Behavior: Cooperative  Preception: [place] to Person, [place] to Time, [place] to Place, [place] to Situation  Attention:Normal: No  Attention: Distractible  Thought Content:Normal: No  Thought Content: Paranoia  Hallucinations:  Auditory (Comment), Visual (Comment)  Delusions: No  Memory:Normal: Yes  Insight and Judgment: No  Insight and Judgment: Poor Judgment, Poor Insight  Present Suicidal Ideation: Yes  Present Homicidal Ideation: No    Pt admitted with followings belongings:  Dentures: None  Vision - Corrective Lenses: Glasses  Hearing Aid: None  Jewelry: Ring  Body Piercings Removed: N/A  Clothing: Shirt, Pajamas, Pants, Footwear, Other (Comment) (tub of markers, 3 coloring books, journal, sherlock young book, small backpack)  Were All Patient Medications Collected?: Not Applicable  Other Valuables: Cell phone, Money (Comment), Purse, Wallet (2 debit cards, 2 credit cards, $12 and change, lighters x 3, vape pen in security envelope in safe)      Valuables placed in safe in security envelope,   Patient oriented to surroundings and program expectations and copy of patient rights given. Received admission packet:    Consents reviewed, signed  Patient verbalize understanding:    Patient admitted due to suicidal ideation. Patient states she has no plan in place but thought about OD on her medication. Patient reported that her depression increased after her sister \"outed \"her to her family about being bi-sexual. Patient endorses [facility]. Auditory are voices but can't make out what they are saying and Visual are seeing shadows that are small . Patient contracted for safety and stated \"I know I need help. I feel safe here. \"                  Chong Hernandez RN

## 2021-10-19 NOTE — GROUP NOTE
Group Therapy Note    Date: 10/19/2021    Group Start Time: 1300  Group End Time: 1350  Group Topic: Cognitive Skills    [id number] [facility]        Group Therapy Note    Attendees: 7    Group members were asked to complete a mindful reflection activity. They were given the opportunity to highlight a success, a challenge, and something they are looking forward too. They were then asked to share when they completed the activity. Notes:  Luann Gould attended group for the full duration. Luann Gould completed her activity and quietly sat at the table. Towards the end of group, Luann Gould mentioned a Beatles moved and engaged in conversation with others in the group about it. Status After Intervention:  Improved    Participation Level:  Active Listener    Participation Quality: Appropriate and Attentive      Speech:  hesitant      Thought Process/Content: Logical      Affective Functioning: Congruent      Mood: depressed      Level of consciousness:  Alert, Oriented x4 and Attentive      Response to Learning: Able to verbalize current knowledge/experience      Endings: None Reported    Modes of Intervention: Socialization, Exploration and Activity      Discipline Responsible: Behavorial Health Tech      Signature:  VAMSI Jerry

## 2021-10-19 NOTE — H&P
Psychiatric Admission Evaluation       Admission Date:    10/18/2021  Identifying Info:   Patient is a 28 y.o. female with hx of depression and anxiety   Patient was admitted to psychiatric unit on a voluntarily basis. Chief complaint:  Worsening depression and a/v/h    HPI: 29 y/o wf with hx of depression and anxiety that presented to ed for worsening depression and a/v/h. Pt stated that she has been having worsening of her sx's for the past 2 weeks. Pt stated that she has been experiencing visual hallucinatiosn which describes as a mouse running across the floor or shadow ppl from corner of her eyes. Pt also stated that she has been having a/h which she describes as someone speaking in the other room. Pt stated that they have been trying medications abilify cause  Her to be anxious, not able to stop moving and smoking a lot. Geodon caused feelings of depersonalization and currently seroquel which she feels slightly better but too sleepy. Pt stated that she has been on prozac since age 22-22 y/o and she reports that it works for her. Pt describes inderal as good for her anxiety. Pt stated that she has been isolating, no motivation, dec concentration, feeling detach, anhedonia, hypersomnia and describes problems staying asleep and interrupted sleep, dec energy. Pt denies jayant or hypomania. current medications    Prior to Admission medications    Medication Sig Start Date End Date Taking?  Authorizing Provider   QUEtiapine (SEROQUEL) 25 MG tablet Take 1 tablet by mouth nightly 10/14/21   SAURAV Steele CNP   propranolol (INDERAL) 10 MG tablet Take 1 tablet by mouth 3 times daily as needed (anxiety) 9/24/21   SAURAV Steele CNP   FLUoxetine (PROZAC) 40 MG capsule Take 1 capsule by mouth daily 9/24/21 12/23/21  SAURAV Steele CNP   Armodafinil (NUVIGIL) 150 MG TABS tablet One tab QAM PRN  Patient not taking: Reported on 9/24/2021 9/21/21 9/21/24  Crystal Jaquez MD Biotin w/ Vitamins C & E (HAIR/SKIN/NAILS PO) Take by mouth  Patient not taking: Reported on 9/24/2021    Historical Provider, MD   ibuprofen (ADVIL;MOTRIN) 600 MG tablet Take 1 tablet by mouth every 6 hours as needed for Pain 1/28/19   Gina Mena MD   Fexofenadine-Pseudoephedrine (ALLEGRA-D 24 HOUR PO) Take 1 tablet by mouth once as needed    Historical Provider, MD     Past psychiatric and medical history:  Hosp:no previous admissions and no suicide attempts, OutpatientTx: Dr Pastor Patel       Substance Abuse History: none reported    Social Hx: Pt lives with  and 3 kids ages 5,5,2 y/o. Pt hs grad and works at  [employer]. No hx of abuse. No legal issues.      Family Mental Health Hx:   Father, mom and siblings depression and anxiety     Mental Status Examination:    Level of consciousness:  within normal limits and awake  Appearance:  well-appearing, street clothes, in chair, fair grooming and good hygiene well-developed, well-nourished  Behavior/Motor:  no abnormalities noted normal gait and station and normal balance AIMS: 0  Attitude toward examiner:  cooperative, attentive and good eye contact  Speech:  spontaneous, normal rate and normal volume Mood:  anxious and depressed Affect:  blunted  Hallucinations: Absent Thought processes:  linear  Attention: attention span and concentration were age appropriate Thought content:  Reality based no evidence of delusions Abstraction: inatct  OCD: none   Insight: impaired insight Judgement: impaired judgment  Cognition:  oriented to person, place, and time  Fund of Knowledge: average   IQ: average Memory: intact  Suicide:  no specific plan to harm self Sleep: has interrupted sleep and has restless sleep Appetite: ok     Inventory of strengths and weaknesses:Family support  ROS:  See Medical H&PE    PE:  BP (!) 167/74   Pulse 77   Temp 98 °F (36.7 °C) (Temporal)   Resp 16   Ht 5' 1\" (1.549 m)   Wt 165 lb (74.8 kg)   LMP 10/01/2021 (Approximate) SpO2 98%   BMI 31.18 kg/m²     Cranial Nerves: 1-12 appear to be intact . LAB:   Admission on 10/18/2021   Component Date Value Ref Range Status    Acetaminophen Level 10/18/2021 <5* 10 - 30 ug/mL Final    Comment: Therapeutic Range: 10.0-30.0 ug/mL  Toxic: >=150 ug/mL      WBC 10/18/2021 9.6  4.0 - 11.0 K/uL Final    RBC 10/18/2021 4.92  4.00 - 5.20 M/uL Final    Hemoglobin 10/18/2021 12.7  12.0 - 16.0 g/dL Final    Hematocrit 10/18/2021 38.8  36.0 - 48.0 % Final    MCV 10/18/2021 78.8* 80.0 - 100.0 fL Final    MCH 10/18/2021 25.9* 26.0 - 34.0 pg Final    MCHC 10/18/2021 32.8  31.0 - 36.0 g/dL Final    RDW 10/18/2021 16.6* 12.4 - 15.4 % Final    Platelets 95/15/7606 362  135 - 450 K/uL Final    MPV 10/18/2021 9.0  5.0 - 10.5 fL Final    Neutrophils % 10/18/2021 67.6  % Final    Lymphocytes % 10/18/2021 19.4  % Final    Monocytes % 10/18/2021 8.5  % Final    Eosinophils % 10/18/2021 3.7  % Final    Basophils % 10/18/2021 0.8  % Final    Neutrophils Absolute 10/18/2021 6.5  1.7 - 7.7 K/uL Final    Lymphocytes Absolute 10/18/2021 1.9  1.0 - 5.1 K/uL Final    Monocytes Absolute 10/18/2021 0.8  0.0 - 1.3 K/uL Final    Eosinophils Absolute 10/18/2021 0.4  0.0 - 0.6 K/uL Final    Basophils Absolute 10/18/2021 0.1  0.0 - 0.2 K/uL Final    Sodium 10/18/2021 136  136 - 145 mmol/L Final    Potassium 10/18/2021 3.9  3.5 - 5.1 mmol/L Final    Chloride 10/18/2021 103  99 - 110 mmol/L Final    CO2 10/18/2021 22  21 - 32 mmol/L Final    Anion Gap 10/18/2021 11  3 - 16 Final    Glucose 10/18/2021 80  70 - 99 mg/dL Final    BUN 10/18/2021 7  7 - 20 mg/dL Final    CREATININE 10/18/2021 0.7  0.6 - 1.1 mg/dL Final    GFR Non- 10/18/2021 >60  >60 Final    Comment: >60 mL/min/1.73m2 EGFR, calc. for ages 25 and older using the  MDRD formula (not corrected for weight), is valid for stable  renal function.       GFR  10/18/2021 >60  >60 Final    Comment: Chronic Kidney Disease: less than 60 ml/min/1.73 sq.m. Kidney Failure: less than 15 ml/min/1.73 sq.m. Results valid for patients 18 years and older.  Calcium 10/18/2021 9.4  8.3 - 10.6 mg/dL Final    Total Protein 10/18/2021 7.9  6.4 - 8.2 g/dL Final    Albumin 10/18/2021 4.4  3.4 - 5.0 g/dL Final    Albumin/Globulin Ratio 10/18/2021 1.3  1.1 - 2.2 Final    Total Bilirubin 10/18/2021 0.3  0.0 - 1.0 mg/dL Final    Alkaline Phosphatase 10/18/2021 96  40 - 129 U/L Final    ALT 10/18/2021 20  10 - 40 U/L Final    AST 10/18/2021 16  15 - 37 U/L Final    Globulin 10/18/2021 3.5  Not Established g/dL Final    Amphetamine Screen, Urine 10/18/2021 Neg  Negative <1000ng/mL Final    Barbiturate Screen, Ur 10/18/2021 Neg  Negative <200 ng/mL Final    Benzodiazepine Screen, Urine 10/18/2021 Neg  Negative <200 ng/mL Final    Cannabinoid Scrn, Ur 10/18/2021 Neg  Negative <50 ng/mL Final    Cocaine Metabolite Screen, Urine 10/18/2021 Neg  Negative <300 ng/mL Final    Opiate Scrn, Ur 10/18/2021 Neg  Negative <300 ng/mL Final    Comment: \"Therapeutic levels of pain medication, especially oxycontin and synthetic  opioids, may not be detected by this Methodology. Pain management screen  panel  Drug panel-PM-Hi Res Ur, Interp (PAIN) should be considered for drug  monitoring \".  PCP Screen, Urine 10/18/2021 Neg  Negative <25 ng/mL Final    Methadone Screen, Urine 10/18/2021 Neg  Negative <300 ng/mL Final    Propoxyphene Scrn, Ur 10/18/2021 Neg  Negative <300 ng/mL Final    Oxycodone Urine 10/18/2021 Neg  Negative <100 ng/ml Final    pH, UA 10/18/2021 6.5   Final    Comment: Urine pH less than 5.0 or greater than 8.0 may indicate sample adulteration. Another sample should be collected if clinically  indicated.  Drug Screen Comment: 10/18/2021 see below   Final    Comment: This method is a screening test to detect only these drug  classes as part of a medical workup.   Confirmatory testing  by another method should be ordered if clinically indicated.  Ethanol Lvl 10/18/2021 None Detected  mg/dL Final    Comment:    None Detected  Conversion factor:  100 mg/dl = .100 g/dl  For Medical Purposes Only      Color, UA 10/18/2021 Yellow  Straw/Yellow Final    Clarity, UA 10/18/2021 Clear  Clear Final    Glucose, Ur 10/18/2021 Negative  Negative mg/dL Final    Bilirubin Urine 10/18/2021 Negative  Negative Final    Ketones, Urine 10/18/2021 Negative  Negative mg/dL Final    Specific Gravity, UA 10/18/2021 <=1.005  1.005 - 1.030 Final    Blood, Urine 10/18/2021 Negative  Negative Final    pH, UA 10/18/2021 6.5  5.0 - 8.0 Final    Protein, UA 10/18/2021 Negative  Negative mg/dL Final    Urobilinogen, Urine 10/18/2021 0.2  <2.0 E.U./dL Final    Nitrite, Urine 10/18/2021 Negative  Negative Final    Leukocyte Esterase, Urine 10/18/2021 SMALL* Negative Final    Microscopic Examination 10/18/2021 YES   Final    Urine Type 10/18/2021 NotGiven   Final    Urine received in a container without preservatives.  HCG(Urine) Pregnancy Test 10/18/2021 Negative  Detects HCG level >20 MIU/mL Final    Comment: Note:  Always repeat results in question with a serum  quantitative pregnancy test. A serum hCG is positive  2-5 days before the urine hCG test.      Salicylate, Serum 98/42/7942 <0.3* 15.0 - 30.0 mg/dL Final    Comment: Therapeutic Range: 15.0-30.0 mg/dL  Toxic: >30.0 mg/dL      SARS-CoV-2 RNA, RT PCR 10/18/2021 NOT DETECTED  NOT DETECTED Final    Comment: Not Detected results do not preclude SARS-CoV-2 infection and  should not be used as the sole basis for patient management  decisions. Results must be combined with clinical observations,  patient history, and epidemiological information. Testing was performed using ABHIL[employer]AVI SARS-CoV-2 and Influenza A/B  nucleic acid assay.  This test is a multiplex Real-Time Reverse  Transcriptase Polymerase Chain Reaction (RT-PCR)-based in vitro  diagnostic test intended for the qualitative detection of nucleic  acids from SARS-CoV-2, influenza A, and influenza B in nasopharyngeal  and nasal swab specimens for use under the FDAs Emergency Use  Authorization (EUA) only. Patient Fact Sheet:  FindDrives.pl  Provider Fact Sheet: FindDrives.pl  EUA: FindDrives.pl  IFU: FindDrives.pl    Methodology:  RT-PCR      INFLUENZA A 10/18/2021 NOT DETECTED  NOT DETECTED Final    INFLUENZA B 10/18/2021 NOT DETECTED  NOT DETECTED Final    WBC, UA 10/18/2021 3-5  0 - 5 /HPF Final    RBC, UA 10/18/2021 0-2  0 - 4 /HPF Final    Epithelial Cells, UA 10/18/2021 2-5  0 - 5 /HPF Final         Formulation: Pt appears to have worsening depression with debilitating psychotic sx's. Dx: axis I: MDD severe with psychosis  Axis 2: deferred   Josseline 3: See Medical History  d Axis 4: Problems with primary support group and Other psychosocial and environmental problems      Tx plan:    prevent self injury, stabilize affect, restore sleep, treat depression, treat psychosis, establish/maintain aftercare. All conditions present on admission are being treated while pt is hospitalized.      Medications  Current Facility-Administered Medications   Medication Dose Route Frequency Provider Last Rate Last Admin    nicotine (NICODERM CQ) 14 MG/24HR 1 patch  1 patch TransDERmal Daily Zhao James        FLUoxetine (PROZAC) capsule 60 mg  60 mg Oral Daily Amor Hernandez MD        lamoTRIgine (LAMICTAL) tablet 50 mg  50 mg Oral Daily Amor Hernandez MD        QUEtiapine (SEROQUEL) tablet 25 mg  25 mg Oral Nightly Amor Hernandez MD        acetaminophen (TYLENOL) tablet 650 mg  650 mg Oral Q4H PRN Kristy Buchanan MD        LORazepam (ATIVAN) tablet 1 mg  1 mg Oral Q6H PRN Kristy Buchanan MD          nicotine  1 patch TransDERmal Daily    FLUoxetine  60 mg Oral Daily    lamoTRIgine  50 mg Oral Daily    QUEtiapine  25 mg Oral Nightly      PRN Meds: acetaminophen, LORazepam   Estimated length of stay: 3-5 days  Prognosis:  good   Criteria for Discharge:    Not suicidal, sleeping well, affect stable, depression improving, eating well, aftercare arranged. History and Physical Dictated  Spent at least 70 minutes with evaluation process and more than 50% of the time was spent obtaining history and planning treatment with the patient  Dx: Behavioral Services  Medicare Certification Upon Admission    I certify that this patient's inpatient psychiatric hospital admission is medically necessary for:   X (1) Treatment which could reasonably be expected to improve this patient's condition,      X (2) Or for diagnostic study;     AND    X (2) The inpatient psychiatric services are provided while the individual is under the care of a physician and are included in the individualized plan of care.     Estimated length of stay/service 3-5 days    Plan for post-hospital care outpt    Electronically signed by Jackie Hughes MD on 10/19/2021 at 1:49 PM

## 2021-10-19 NOTE — BH NOTE
Patient arrived on the unit at 2145 with paperwork and belongings. Patient admitted for SI with no plan.

## 2021-10-19 NOTE — H&P
Hospital Medicine History & Physical      PCP: Estephanie Colvin MD    Date of Admission: 10/18/2021    Date of Service: Pt seen/examined on 10/19/2021    Chief Complaint:    Chief Complaint   Patient presents with    Psychiatric Evaluation     Pt states that her psychiatrist changed her meds about 3 weeks ago. pt feels withdrawn, SI          History Of Present Illness: The patient is a 28 y.o. female with a PMH of Depression, Idiopathic Hypersomnia who presented to [facility] for suicidal ideation and worsening depression following a medication change. Patient was seen and evaluated in the ED by the ED medical provider, patient was medically cleared for admission to [facility] at [facility]. This note serves as an admission medical H&P.   PCP: Estephanie Colvin MD   Tobacco Use: 0.5 ppd  EtOH Use: rare use  Illicit Drug Use: occasional cannabis use    Pt denies any medical concerns at this time. Past Medical History:        Diagnosis Date    Abnormal Pap smear of cervix     will biopsy post partum    Anxiety     Chickenpox     Depression     HX OTHER MEDICAL     mild leg length and hand size discrepancy    Hypersomnia     IBS (irritable bowel syndrome)     Lactose intolerance     Mental disorder     Depression; not medicated currently    Ovarian cyst        Past Surgical History:        Procedure Laterality Date    TUBAL LIGATION         Medications Prior to Admission:    Prior to Admission medications    Medication Sig Start Date End Date Taking?  Authorizing Provider   QUEtiapine (SEROQUEL) 25 MG tablet Take 1 tablet by mouth nightly 10/14/21   SAURAV Lyons CNP   propranolol (INDERAL) 10 MG tablet Take 1 tablet by mouth 3 times daily as needed (anxiety) 9/24/21   SAURAV Lyons CNP   FLUoxetine (PROZAC) 40 MG capsule Take 1 capsule by mouth daily 9/24/21 12/23/21  SAURAV Lyons CNP   Armodafinil (NUVIGIL) 150 MG TABS tablet One tab QAM PRN  Patient not taking: Reported on 9/24/2021 9/21/21 9/21/24  Lucia Moser MD   Biotin w/ Vitamins C & E (HAIR/SKIN/NAILS PO) Take by mouth  Patient not taking: Reported on 9/24/2021    Historical Provider, MD   ibuprofen (ADVIL;MOTRIN) 600 MG tablet Take 1 tablet by mouth every 6 hours as needed for Pain 1/28/19   Carmen Mena MD   Fexofenadine-Pseudoephedrine (ALLEGRA-D 24 HOUR PO) Take 1 tablet by mouth once as needed    Historical Provider, MD       Allergies:  Depo-provera [medroxyprogesterone acetate] and Promethazine    Social History:  The patient currently lives with family. TOBACCO:   reports that she has been smoking cigarettes. She has been smoking about 0.25 packs per day. She has never used smokeless tobacco.  ETOH:   reports current alcohol use of about 1.0 standard drinks of alcohol per week. Family History:   Positive as follows:        Problem Relation Age of Onset    Arthritis Mother     Depression Mother     Arthritis Father     Depression Father     Hearing Loss Sister     Learning Disabilities Brother     Mental Retardation Brother     Cancer Maternal Grandmother     Cancer Paternal Grandfather     Kidney Disease Paternal Grandfather        REVIEW OF SYSTEMS:     Constitutional: Negative for fever   HENT: Negative for sore throat   Eyes: Negative for redness   Respiratory: Negative  for dyspnea, cough   Cardiovascular: Negative for chest pain   Gastrointestinal: Negative for vomiting, diarrhea   Genitourinary: Negative for hematuria   Musculoskeletal: Negative for arthralgias   Skin: Negative for rash   Neurological: Negative for syncope   Hematological: Negative for adenopathy   Psychiatric/Behavorial: Negative for anxiety    PHYSICAL EXAM:    BP (!) 167/74   Pulse 77   Temp 98 °F (36.7 °C) (Temporal)   Resp 16   Ht 5' 1\" (1.549 m)   Wt 165 lb (74.8 kg)   LMP 10/01/2021 (Approximate)   SpO2 98%   BMI 31.18 kg/m²   Gen: No distress. Alert. Evorn Royal  female, obese  Eyes: PERRL.  No sclera icterus. No conjunctival injection. glasses  ENT: No discharge. Pharynx clear. Neck:  Trachea midline. Resp: No accessory muscle use. No crackles. No wheezes. No rhonchi. CV: Regular rate. Regular rhythm. No murmur. No rub. No edema. GI: Soft, Non-tender. Non-distended. Skin: Warm and dry. No rash on exposed extremities. Neuro: Awake. Grossly nonfocal, CN II-XII intact, moves all extremities, follows commands, no dysarthria    Psych: Defer to psychiatry.     CBC:   Recent Labs     10/18/21  1815   WBC 9.6   HGB 12.7   HCT 38.8   MCV 78.8*        BMP:   Recent Labs     10/18/21  1815      K 3.9      CO2 22   BUN 7   CREATININE 0.7     LIVER PROFILE:   Recent Labs     10/18/21  1815   AST 16   ALT 20   BILITOT 0.3   ALKPHOS 96     UA:  Recent Labs     10/18/21  1815   COLORU Yellow   PHUR 6.5  6.5   WBCUA 3-5   RBCUA 0-2   CLARITYU Clear   SPECGRAV <=1.005   LEUKOCYTESUR SMALL*   UROBILINOGEN 0.2   BILIRUBINUR Negative   BLOODU Negative   GLUCOSEU Negative      U/A:    Lab Results   Component Value Date    COLORU Yellow 10/18/2021    WBCUA 3-5 10/18/2021    RBCUA 0-2 10/18/2021    MUCUS 1+ 01/19/2021    BACTERIA 1+ 01/19/2021    CLARITYU Clear 10/18/2021    SPECGRAV <=1.005 10/18/2021    LEUKOCYTESUR SMALL 10/18/2021    BLOODU Negative 10/18/2021    GLUCOSEU Negative 10/18/2021     CULTURES    SARS-COV-2 - Rapid: Not detected    Rapid Influenza A/B: negative      EKG:  None    RADIOLOGY  None    Pertinent previous results reviewed   None    ASSESSMENT/PLAN:    Major Depression, recurrent  Suicidal Ideation  - cont mgmt per [facility]    Idiopathic Hypersomnia  - f/w Sleep Medicine - Dr. Matthew Delgadillo  - has not yet started taking Nuvigil    ERVIN  - does not use CPAP  - reports this is \"very mild\"    Cannabis Abuse  - reports occasional use  - UDS was negative  - counseled cessation     Tobacco Dependence  - Recommended cessation  - nicotine patch ordered     Obesity  - Body mass index is 31.18 kg/m².  - Counseled on weight loss. Pt has no medical complaints at this time. Pt was informed that they may have [facility] contact us should any medical concerns arise during this admission.     Zhao Howell PA-C 10:18 AM 10/19/2021

## 2021-10-19 NOTE — FLOWSHEET NOTE
Identified Limitations  takng care of myself and putting myself first for once   Perception of most stressful event prior to hospitalization lost my emotions and not feeling anything towards my kids. things building up, sister told family that pt is bisexual, pt is in school, new job, loss of dog   Perception of changes needed medication adjusted, deal with everything instead of shutting down   Strengths and Limitations   Strengths Independent in basic self-care activities; Positive leisure interests;Demonstrates basic social skills; Positive support network   Limitations Tendency to isolate self;Difficult relationships / poor social skills; External locus of control;Demonstrates discomfort with /lack of social skills; Difficulty problem solving/relies on others to help solve problems        met with pt and completed assessments and CSSRS. Pt denies SI and A/V hallucinations at this time. Pt voices not concerns at this time except wanting medication to be managed.

## 2021-10-20 PROCEDURE — 99233 SBSQ HOSP IP/OBS HIGH 50: CPT | Performed by: PSYCHIATRY & NEUROLOGY

## 2021-10-20 PROCEDURE — 1240000000 HC EMOTIONAL WELLNESS R&B

## 2021-10-20 PROCEDURE — 6370000000 HC RX 637 (ALT 250 FOR IP): Performed by: PSYCHIATRY & NEUROLOGY

## 2021-10-20 PROCEDURE — 6370000000 HC RX 637 (ALT 250 FOR IP): Performed by: PHYSICIAN ASSISTANT

## 2021-10-20 RX ADMIN — LORAZEPAM 1 MG: 1 TABLET ORAL at 19:51

## 2021-10-20 RX ADMIN — FLUOXETINE 60 MG: 20 CAPSULE ORAL at 08:28

## 2021-10-20 RX ADMIN — QUETIAPINE FUMARATE 25 MG: 25 TABLET ORAL at 20:52

## 2021-10-20 RX ADMIN — LAMOTRIGINE 50 MG: 25 TABLET ORAL at 08:27

## 2021-10-20 NOTE — GROUP NOTE
Group Therapy Note    Date: 10/19/2021    Group Start Time: 2020  Group End Time: 2050  Group Topic: 100 Denisa Kiran RN        Group Therapy Note    Attendees: 9         Patient's Goal:  Pt states she is just trying to get comfortable here. Notes:  States she already feels more comfortable. Stated  visited which went well. Got to talk to her children on the phone. States her appetite was better and overall she is feeling better. Status After Intervention:  Improved    Participation Level:  Active Listener and Interactive    Participation Quality: Appropriate, attentive, and sharing      Speech:  normal      Thought Process/Content: Logical  Linear      Affective Functioning: Congruent      Mood: euthymic      Level of consciousness:  Alert and Oriented x4      Response to Learning: Able to verbalize current knowledge/experience, Able to verbalize/acknowledge new learning and Able to retain information      Endings: None Reported    Modes of Intervention: Education, Support and Socialization      Discipline Responsible: Registered Nurse      Signature:  Patricia Mcintosh RN

## 2021-10-20 NOTE — GROUP NOTE
Group Therapy Note    Date: 10/20/2021    Group Start Time: 1300  Group End Time: 1074  Group Topic: [address] [facility], [place]        Group Therapy Note    Music Therapy group consisted of link analysis intervention and verbal processing. During process patients discussed different aspects of dreams (goals, fantasies, daydreams, nightmares). Patients then engaged in creative intervention, illustrating a dream from 3 phases of life: childhood, self-discovery, and reminiscence. Attendees: 8         Notes:  Adriane Avina was present and attentive across group session, flat with minimal verbal engagement. Patient did engage in creative intervention, refused to share with group members when provided opportunity to share.     Status After Intervention:  Unchanged    Participation Level: Minimal    Participation Quality: Lethargic      Speech:  mute      Thought Process/Content: Logical      Affective Functioning: Congruent      Mood: depressed      Level of consciousness:  Alert and Attentive      Response to Learning: Capable of insight and Progressing to goal      Endings: None Reported    Modes of Intervention: Education, Socialization, Exploration, Clarifying, Activity and Media      Discipline Responsible: Psychoeducational Specialist      Signature:  Doreen Fox MM, MT-BC

## 2021-10-20 NOTE — PLAN OF CARE
Problem: Altered Mood, Depressive Behavior:  Goal: Able to verbalize and/or display a decrease in depressive symptoms  Description: Able to verbalize and/or display a decrease in depressive symptoms  10/20/2021 1500 by Erica Naranjo LPN  Outcome: Ongoing     Problem: Altered Mood, Depressive Behavior:  Goal: Absence of self-harm  Description: Absence of self-harm  10/20/2021 1500 by Erica Naranjo LPN  Outcome: Ongoing   Samia Huddleston has been visible but withdrawn to self this shift. Patient is calm, cooperative and medication compliant. Patient currently denies SI/HI/AVH. Patient expresses feeling better since admission. Will continue to monitor for safety.

## 2021-10-20 NOTE — PLAN OF CARE
Problem: Altered Mood, Depressive Behavior:  Goal: Able to verbalize acceptance of life and situations over which he or she has no control  Description: Able to verbalize acceptance of life and situations over which he or she has no control  10/20/2021 0156 by Rachel Saenz RN  Outcome: Ongoing     Problem: Altered Mood, Depressive Behavior:  Goal: Able to verbalize and/or display a decrease in depressive symptoms  Description: Able to verbalize and/or display a decrease in depressive symptoms  10/20/2021 0156 by Rachel Saenz RN  Outcome: Ongoing     Problem: Altered Mood, Depressive Behavior:  Goal: Absence of self-harm  Description: Absence of self-harm  Outcome: Ongoing   Pt has been mostly out on the unit with peers this evening. She is interacting well. Attended Wrap Up Group and was verbal there. Pt appears brighter. States she's feeling better and more hopeful. She did say she was hearing voices but that it had greatly decreased. Feels more hopeful.

## 2021-10-20 NOTE — PROGRESS NOTES
Department of Psychiatry  Attending Progress Note  Chief Complaint: follow-up depression, and a/h  Patient's chart was reviewed and collaborated with  about the treatment plan. SUBJECTIVE:    Patient is feeling better. Suicidal ideation:  denies suicidal ideation. Patient does not have medication side effects. Pt stated that she feels that depression and a/h are improving. She describes not feeling like someone else and feeling more like herself. Pt stated that she is tired but is due to her sleep disorder. No changes in meds,      ROS: Patient has new complaints: no  Sleeping adequately:  Yes   Appetite adequate: Yes  Attending groups: Yes  Visitors:No    OBJECTIVE    Physical  VITALS:  /80   Pulse 94   Temp 98.2 °F (36.8 °C) (Temporal)   Resp 16   Ht 5' 1\" (1.549 m)   Wt 165 lb (74.8 kg)   LMP 10/01/2021 (Approximate)   SpO2 97%   BMI 31.18 kg/m²     Mental Status Examination:  Patients appearance was well-appearing, street clothes, lying in bed, fair grooming and good hygiene. Thoughts are Logical. Homicidal ideations none. No abnormal movements, tics or mannerisms. Memory intact Aims 0. Concentration Good. Alert and oriented X 4. Insight and Judgement limited. Patient was cooperative.  Patient gait normal. Mood better, affect depressed affect Hallucinations Absent, suicidal ideations no specific plan to harm self Speech fluent and spontaneous    Data  Labs:   Admission on 10/18/2021   Component Date Value Ref Range Status    Acetaminophen Level 10/18/2021 <5* 10 - 30 ug/mL Final    Comment: Therapeutic Range: 10.0-30.0 ug/mL  Toxic: >=150 ug/mL      WBC 10/18/2021 9.6  4.0 - 11.0 K/uL Final    RBC 10/18/2021 4.92  4.00 - 5.20 M/uL Final    Hemoglobin 10/18/2021 12.7  12.0 - 16.0 g/dL Final    Hematocrit 10/18/2021 38.8  36.0 - 48.0 % Final    MCV 10/18/2021 78.8* 80.0 - 100.0 fL Final    MCH 10/18/2021 25.9* 26.0 - 34.0 pg Final    MCHC 10/18/2021 32.8  31.0 - 36.0 g/dL Final    RDW 10/18/2021 16.6* 12.4 - 15.4 % Final    Platelets 35/58/8747 362  135 - 450 K/uL Final    MPV 10/18/2021 9.0  5.0 - 10.5 fL Final    Neutrophils % 10/18/2021 67.6  % Final    Lymphocytes % 10/18/2021 19.4  % Final    Monocytes % 10/18/2021 8.5  % Final    Eosinophils % 10/18/2021 3.7  % Final    Basophils % 10/18/2021 0.8  % Final    Neutrophils Absolute 10/18/2021 6.5  1.7 - 7.7 K/uL Final    Lymphocytes Absolute 10/18/2021 1.9  1.0 - 5.1 K/uL Final    Monocytes Absolute 10/18/2021 0.8  0.0 - 1.3 K/uL Final    Eosinophils Absolute 10/18/2021 0.4  0.0 - 0.6 K/uL Final    Basophils Absolute 10/18/2021 0.1  0.0 - 0.2 K/uL Final    Sodium 10/18/2021 136  136 - 145 mmol/L Final    Potassium 10/18/2021 3.9  3.5 - 5.1 mmol/L Final    Chloride 10/18/2021 103  99 - 110 mmol/L Final    CO2 10/18/2021 22  21 - 32 mmol/L Final    Anion Gap 10/18/2021 11  3 - 16 Final    Glucose 10/18/2021 80  70 - 99 mg/dL Final    BUN 10/18/2021 7  7 - 20 mg/dL Final    CREATININE 10/18/2021 0.7  0.6 - 1.1 mg/dL Final    GFR Non- 10/18/2021 >60  >60 Final    Comment: >60 mL/min/1.73m2 EGFR, calc. for ages 25 and older using the  MDRD formula (not corrected for weight), is valid for stable  renal function.  GFR  10/18/2021 >60  >60 Final    Comment: Chronic Kidney Disease: less than 60 ml/min/1.73 sq.m. Kidney Failure: less than 15 ml/min/1.73 sq.m. Results valid for patients 18 years and older.       Calcium 10/18/2021 9.4  8.3 - 10.6 mg/dL Final    Total Protein 10/18/2021 7.9  6.4 - 8.2 g/dL Final    Albumin 10/18/2021 4.4  3.4 - 5.0 g/dL Final    Albumin/Globulin Ratio 10/18/2021 1.3  1.1 - 2.2 Final    Total Bilirubin 10/18/2021 0.3  0.0 - 1.0 mg/dL Final    Alkaline Phosphatase 10/18/2021 96  40 - 129 U/L Final    ALT 10/18/2021 20  10 - 40 U/L Final    AST 10/18/2021 16  15 - 37 U/L Final    Globulin 10/18/2021 3.5  Not Established g/dL Final    Amphetamine Screen, Urine 10/18/2021 Neg  Negative <1000ng/mL Final    Barbiturate Screen, Ur 10/18/2021 Neg  Negative <200 ng/mL Final    Benzodiazepine Screen, Urine 10/18/2021 Neg  Negative <200 ng/mL Final    Cannabinoid Scrn, Ur 10/18/2021 Neg  Negative <50 ng/mL Final    Cocaine Metabolite Screen, Urine 10/18/2021 Neg  Negative <300 ng/mL Final    Opiate Scrn, Ur 10/18/2021 Neg  Negative <300 ng/mL Final    Comment: \"Therapeutic levels of pain medication, especially oxycontin and synthetic  opioids, may not be detected by this Methodology. Pain management screen  panel  Drug panel-PM-Hi Res Ur, Interp (PAIN) should be considered for drug  monitoring \".  PCP Screen, Urine 10/18/2021 Neg  Negative <25 ng/mL Final    Methadone Screen, Urine 10/18/2021 Neg  Negative <300 ng/mL Final    Propoxyphene Scrn, Ur 10/18/2021 Neg  Negative <300 ng/mL Final    Oxycodone Urine 10/18/2021 Neg  Negative <100 ng/ml Final    pH, UA 10/18/2021 6.5   Final    Comment: Urine pH less than 5.0 or greater than 8.0 may indicate sample adulteration. Another sample should be collected if clinically  indicated.  Drug Screen Comment: 10/18/2021 see below   Final    Comment: This method is a screening test to detect only these drug  classes as part of a medical workup. Confirmatory testing  by another method should be ordered if clinically indicated.       Ethanol Lvl 10/18/2021 None Detected  mg/dL Final    Comment:    None Detected  Conversion factor:  100 mg/dl = .100 g/dl  For Medical Purposes Only      Color, UA 10/18/2021 Yellow  Straw/Yellow Final    Clarity, UA 10/18/2021 Clear  Clear Final    Glucose, Ur 10/18/2021 Negative  Negative mg/dL Final    Bilirubin Urine 10/18/2021 Negative  Negative Final    Ketones, Urine 10/18/2021 Negative  Negative mg/dL Final    Specific Gravity, UA 10/18/2021 <=1.005  1.005 - 1.030 Final    Blood, Urine 10/18/2021 Negative  Negative Final    pH, UA 10/18/2021 6.5  5.0 - 8.0 Final    Protein, UA 10/18/2021 Negative  Negative mg/dL Final    Urobilinogen, Urine 10/18/2021 0.2  <2.0 E.U./dL Final    Nitrite, Urine 10/18/2021 Negative  Negative Final    Leukocyte Esterase, Urine 10/18/2021 SMALL* Negative Final    Microscopic Examination 10/18/2021 YES   Final    Urine Type 10/18/2021 NotGiven   Final    Urine received in a container without preservatives.  HCG(Urine) Pregnancy Test 10/18/2021 Negative  Detects HCG level >20 MIU/mL Final    Comment: Note:  Always repeat results in question with a serum  quantitative pregnancy test. A serum hCG is positive  2-5 days before the urine hCG test.      Salicylate, Serum 69/72/1917 <0.3* 15.0 - 30.0 mg/dL Final    Comment: Therapeutic Range: 15.0-30.0 mg/dL  Toxic: >30.0 mg/dL      SARS-CoV-2 RNA, RT PCR 10/18/2021 NOT DETECTED  NOT DETECTED Final    Comment: Not Detected results do not preclude SARS-CoV-2 infection and  should not be used as the sole basis for patient management  decisions. Results must be combined with clinical observations,  patient history, and epidemiological information. Testing was performed using ABHILASH XAVI SARS-CoV-2 and Influenza A/B  nucleic acid assay. This test is a multiplex Real-Time Reverse  Transcriptase Polymerase Chain Reaction (RT-PCR)-based in vitro  diagnostic test intended for the qualitative detection of nucleic  acids from SARS-CoV-2, influenza A, and influenza B in nasopharyngeal  and nasal swab specimens for use under the FDAs Emergency Use  Authorization (EUA) only.     Patient Fact Sheet:  FindDrives.pl  Provider Fact Sheet: FindDrives.pl  EUA: FindDrives.pl  IFU: FindDrives.pl    Methodology:  RT-PCR      INFLUENZA A 10/18/2021 NOT DETECTED  NOT DETECTED Final    INFLUENZA B 10/18/2021 NOT DETECTED  NOT DETECTED Final    WBC, UA 10/18/2021 3-5  0 - 5 /HPF Final    RBC, UA 10/18/2021 0-2  0 - 4 /HPF Final    Epithelial Cells, UA 10/18/2021 2-5  0 - 5 /HPF Final            Medications  Current Facility-Administered Medications: nicotine (NICODERM CQ) 14 MG/24HR 1 patch, 1 patch, TransDERmal, Daily  FLUoxetine (PROZAC) capsule 60 mg, 60 mg, Oral, Daily  lamoTRIgine (LAMICTAL) tablet 50 mg, 50 mg, Oral, Daily  QUEtiapine (SEROQUEL) tablet 25 mg, 25 mg, Oral, Nightly  acetaminophen (TYLENOL) tablet 650 mg, 650 mg, Oral, Q4H PRN  LORazepam (ATIVAN) tablet 1 mg, 1 mg, Oral, Q6H PRN    ASSESSMENT AND PLAN    axis I: MDD severe with psychosis  Axis 2: deferred   [place] 3: See Medical History  d Axis 4: Problems with primary support group and Other psychosocial and environmental problems      1. Patient s symptoms   are improving  2. Probable discharge is 1-2 days  3. Discharge planning is incomplete  4 Suicidal ideation is better  5 I spent 35 minutes face to face and more than 50 % was spent coordinating care, exploring sx's and discussing pharamcotherapy

## 2021-10-21 VITALS
TEMPERATURE: 98.4 F | HEIGHT: 61 IN | BODY MASS INDEX: 31.15 KG/M2 | RESPIRATION RATE: 16 BRPM | SYSTOLIC BLOOD PRESSURE: 121 MMHG | DIASTOLIC BLOOD PRESSURE: 67 MMHG | OXYGEN SATURATION: 98 % | WEIGHT: 165 LBS | HEART RATE: 93 BPM

## 2021-10-21 PROCEDURE — 99239 HOSP IP/OBS DSCHRG MGMT >30: CPT | Performed by: PSYCHIATRY & NEUROLOGY

## 2021-10-21 PROCEDURE — 6370000000 HC RX 637 (ALT 250 FOR IP): Performed by: PSYCHIATRY & NEUROLOGY

## 2021-10-21 PROCEDURE — 5130000000 HC BRIDGE APPOINTMENT

## 2021-10-21 PROCEDURE — 6370000000 HC RX 637 (ALT 250 FOR IP): Performed by: PHYSICIAN ASSISTANT

## 2021-10-21 RX ORDER — FLUOXETINE HYDROCHLORIDE 20 MG/1
60 CAPSULE ORAL DAILY
Qty: 90 CAPSULE | Refills: 0 | Status: SHIPPED | OUTPATIENT
Start: 2021-10-22 | End: 2022-03-29 | Stop reason: SDUPTHER

## 2021-10-21 RX ORDER — LAMOTRIGINE 25 MG/1
50 TABLET ORAL DAILY
Qty: 60 TABLET | Refills: 0 | Status: SHIPPED | OUTPATIENT
Start: 2021-10-22 | End: 2022-03-29 | Stop reason: SDUPTHER

## 2021-10-21 RX ADMIN — FLUOXETINE 60 MG: 20 CAPSULE ORAL at 09:32

## 2021-10-21 RX ADMIN — LAMOTRIGINE 50 MG: 25 TABLET ORAL at 09:32

## 2021-10-21 ASSESSMENT — PAIN SCALES - GENERAL: PAINLEVEL_OUTOF10: 0

## 2021-10-21 NOTE — GROUP NOTE
Group Therapy Note    Date: 10/20/2021    Group Start Time: 2030  Group End Time: 2100  Group Topic: [address], RN        Group Therapy Note    Attendees:          Patient's Goal:  Keep feeling my emotions    Notes:  Patient stated that she did meet her goal today    Status After Intervention:  Improved    Participation Level: Interactive    Participation Quality: Appropriate                Signature:  Flex Romeo RN

## 2021-10-21 NOTE — CARE COORDINATION
[facility]  Discharge Note    Pt discharged with followings belongings:   Dentures: None  Vision - Corrective Lenses: Glasses  Hearing Aid: None  Jewelry: Ring  Body Piercings Removed: N/A  Clothing: Footwear, Pants, Shirt, Undergarments (Comment), Pajamas  Were All Patient Medications Collected?: Not Applicable  Other Valuables: Other (Comment)   Valuables sent home with patient or returned to patient. Patient education on aftercare instructions: yes. Information faxed to N/A by this writer  at 12:43 PM .Patient verbalize understanding of AVS:  yes. Status EXAM upon discharge:  Status and Exam  Normal: No  Facial Expression: Brightened  Affect: Appropriate  Level of Consciousness: Alert  Mood:Normal: No  Mood: Depressed  Motor Activity:Normal: Yes  Motor Activity:  (WNL)  Interview Behavior: Cooperative  Preception: Brooks to Person, Sushma Juventino to Time, Brooks to Place, Brooks to Situation  Attention:Normal: Yes  Attention: Others(See comment) (WNL)  Thought Processes: Other(See comment) (WNL)  Thought Content:Normal: Yes  Thought Content: Poverty of Content  Hallucinations: None  Delusions: No  Memory:Normal: Yes  Insight and Judgment: Yes  Insight and Judgment:  (improving)  Present Suicidal Ideation: No  Present Homicidal Ideation: No      Metabolic Screening:    No results found for: LABA1C    Lab Results   Component Value Date    CHOL 144 03/13/2020     Lab Results   Component Value Date    TRIG 63 03/13/2020     Lab Results   Component Value Date    HDL 40 03/13/2020     No components found for: [facility] [place]  Lab Results   Component Value Date    LABVLDL 13 03/13/2020       Omkar Salazar RN    Bridge Appointment completed: Reviewed Discharge Instructions with patient. Patient verbalizes understanding and agreement with the discharge plan using the teachback method.      Referral for Outpatient Tobacco Cessation Counseling, upon discharge (lucy X if applicable and completed):    ( ) Hospital staff assisted patient to call Quit Line or faxed referral                                   during hospitalization                  (X)  Recognizing danger situations (included triggers and roadblocks), if not completed on admission                    (X)  Coping skills (new ways to manage stress, exercise, relaxation techniques, changing routine, distraction), if not completed on admission                                                           (X)  Basic information about quitting (benefits of quitting, techniques in how to quit, available resources, if not completed on admission  ( ) Referral for counseling faxed to [facility]   ( ) Patient refused referral  ( ) Patient refused counseling  ( ) Patient refused smoking cessation medication upon discharge    Vaccinations (lucy X if applicable and completed):  ( ) Patient states already received influenza vaccine elsewhere  ( ) Patient received influenza vaccine during this hospitalization  (X) Patient refused influenza vaccine at this time

## 2021-10-21 NOTE — PLAN OF CARE
Nursing notes 1900 to present- Patient's affect is brighter and she denied SI. She stated that she feels much better and now feels like she has a reason to live. She contribute this improvement to getting her medications adjusted. She denied SI , HI ad AVH. She attended group and ate snack. She was social with some peers. She did receive ativan 1 mg oral at 1951 for anxiety which was effective. No unsafe behaviors noted. Safety checks continue.      Problem: Altered Mood, Depressive Behavior:  Goal: Able to verbalize acceptance of life and situations over which he or she has no control  Description: Able to verbalize acceptance of life and situations over which he or she has no control  Outcome: Ongoing     Problem: Altered Mood, Depressive Behavior:  Goal: Absence of self-harm  Description: Absence of self-harm  10/21/2021 0028 by Dione Carrillo RN  Outcome: Ongoing  10/20/2021 1500 by Griselda Matt LPN  Outcome: Ongoing

## 2021-10-21 NOTE — PLAN OF CARE
Problem: Altered Mood, Depressive Behavior:  Goal: Able to verbalize acceptance of life and situations over which he or she has no control  Description: Able to verbalize acceptance of life and situations over which he or she has no control  10/21/2021 1221 by Guy Atkinson RN  Outcome: Ongoing     Problem: Altered Mood, Depressive Behavior:  Goal: Able to verbalize and/or display a decrease in depressive symptoms  Description: Able to verbalize and/or display a decrease in depressive symptoms  10/21/2021 1221 by Guy Atkinson RN  Outcome: Ongoing     Problem: Altered Mood, Depressive Behavior:  Goal: Ability to disclose and discuss suicidal ideas will improve  Description: Ability to disclose and discuss suicidal ideas will improve  Outcome: Ongoing     Problem: Altered Mood, Depressive Behavior:  Goal: Able to verbalize support systems  Description: Able to verbalize support systems  Outcome: Ongoing     Problem: Altered Mood, Depressive Behavior:  Goal: Absence of self-harm  Description: Absence of self-harm  10/21/2021 1221 by Guy Atkinson RN  Outcome: Ongoing     Problem: Altered Mood, Depressive Behavior:  Goal: Participates in care planning  Description: Participates in care planning  Outcome: Ongoing      Patient visible on unit. Social with peers. Attended and participated in groups. She has been medication compliant. She was calm and cooperative with interview. She denies SI/HI/AVH. She states overall her mood has improved and she feels ready for discharge.

## 2021-10-21 NOTE — SUICIDE SAFETY PLAN
SAFETY PLAN    A suicide Safety Plan is a document that supports someone when they are having thoughts of suicide. Warning Signs that indicate a suicidal crisis may be developing: What (situations, thoughts, feelings, body sensations, behaviors, etc.) do you experience that lets you know you are beginning to think about suicide? 1.detatched  2. isolated  3. Not caring, no emotions    Internal Coping Strategies:  What things can I do (relaxation techniques, hobbies, physical activities, etc.) to take my mind off my problems without contacting another person? 1. Breathing exercises  2. Stay on top of my meds  3. Remove stimulus    People and social settings that provide distraction: Who can I call or where can I go to distract me? 1. Name:[place] Phone: [phone]  2. Name: Ignacio Bernal Phone: [phone]  3. Place: spending time with Nilda [email or web address]           4. Place: [employer] whom I can ask for help: Who can I call when I need help - for example, friends, family, clergy, someone else? 1. Name:Dr.. Rosado        2. Name: Ignacio Ansari Phone: [phone]  3. Name: [employer] Phone: [phone]    Professionals or [facility] agencies I can contact during a crisis: Who can I call for help - for example, my doctor, my psychiatrist, my psychologist, a mental health provider, a suicide hotline? 1. Clinician Name: [address] [place] [address]   Address: [facility] [address] [place], [facility]      Phone  #: [phone]    2. Suicide Prevention Lifeline: 3-510-520-TALK (6710)    The crisis number for [facility] is [phone]. You can use this number at any time to access emergency mental health services. The crisis number for [facility] is [address] ([address] [place] [address] Avenue can use this number at any time to access emergency mental health services. The crisis number for [facility] is [phone]. You can use this number at any time to access emergency mental health services.   The crisis number for [facility] is [phone] (SAVE). This crisis line is available 24 hours a day, seven days a week. The crisis number for [facility] is [phone]. The crisis number for [facility] is 281-CARE. This crisis line is available 24 hours a day, seven days a week. The crisis number for [facility] is [phone]. This crisis line is available 24 hours a day, seven days a week. The crisis number for [place] is 8-343-515-TALK. You can use this number at any time to access emergency mental health services. A National Crisis Number is 4-163-XZVDVGH (7-589-275-433-097-6634). You can use this number at any time to access emergency mental health services. The crisis number for [facility] is [phone]. You can use this number at any time to access emergency mental health services. [facility] is [phone] ([phone]). You can use this number at any time to access emergency mental health services. Making the environment safe: How can I make my environment (house/apartment/living space) safer? For example, can I remove guns, medications, and other items?   1. Keep open communication with Brenda Garza and be willing to listen when he says he sees the signs again

## 2021-10-31 ENCOUNTER — APPOINTMENT (OUTPATIENT)
Dept: GENERAL RADIOLOGY | Age: 31
End: 2021-10-31
Payer: COMMERCIAL

## 2021-10-31 ENCOUNTER — HOSPITAL ENCOUNTER (EMERGENCY)
Age: 31
Discharge: HOME | End: 2021-11-01
Attending: EMERGENCY MEDICINE
Payer: COMMERCIAL

## 2021-10-31 VITALS
TEMPERATURE: 98.3 F | SYSTOLIC BLOOD PRESSURE: 107 MMHG | BODY MASS INDEX: 31.54 KG/M2 | WEIGHT: 167.06 LBS | OXYGEN SATURATION: 97 % | RESPIRATION RATE: 14 BRPM | HEART RATE: 96 BPM | DIASTOLIC BLOOD PRESSURE: 64 MMHG | HEIGHT: 61 IN

## 2021-10-31 DIAGNOSIS — N39.0 URINARY TRACT INFECTION WITHOUT HEMATURIA, SITE UNSPECIFIED: Primary | ICD-10-CM

## 2021-10-31 LAB
BACTERIA: ABNORMAL /HPF
BILIRUBIN URINE: ABNORMAL
BLOOD, URINE: ABNORMAL
CLARITY: ABNORMAL
COLOR: YELLOW
EPITHELIAL CELLS, UA: ABNORMAL /HPF (ref 0–5)
GLUCOSE URINE: NEGATIVE MG/DL
HYALINE CASTS: ABNORMAL /LPF (ref 0–2)
KETONES, URINE: 80 MG/DL
LEUKOCYTE ESTERASE, URINE: ABNORMAL
MICROSCOPIC EXAMINATION: YES
MUCUS: ABNORMAL /LPF
NITRITE, URINE: NEGATIVE
PH UA: 6 (ref 5–8)
PROTEIN UA: 30 MG/DL
RAPID INFLUENZA  B AGN: NEGATIVE
RAPID INFLUENZA A AGN: NEGATIVE
RBC UA: ABNORMAL /HPF (ref 0–4)
SARS-COV-2, NAAT: NOT DETECTED
SPECIFIC GRAVITY UA: >=1.03 (ref 1–1.03)
URINE REFLEX TO CULTURE: YES
URINE TYPE: ABNORMAL
UROBILINOGEN, URINE: 0.2 E.U./DL
WBC UA: ABNORMAL /HPF (ref 0–5)

## 2021-10-31 PROCEDURE — 87804 INFLUENZA ASSAY W/OPTIC: CPT

## 2021-10-31 PROCEDURE — 87635 SARS-COV-2 COVID-19 AMP PRB: CPT

## 2021-10-31 PROCEDURE — 87086 URINE CULTURE/COLONY COUNT: CPT

## 2021-10-31 PROCEDURE — 6370000000 HC RX 637 (ALT 250 FOR IP): Performed by: EMERGENCY MEDICINE

## 2021-10-31 PROCEDURE — 71046 X-RAY EXAM CHEST 2 VIEWS: CPT

## 2021-10-31 PROCEDURE — 81001 URINALYSIS AUTO W/SCOPE: CPT

## 2021-10-31 PROCEDURE — 99282 EMERGENCY DEPT VISIT SF MDM: CPT

## 2021-10-31 RX ORDER — CEFUROXIME AXETIL 250 MG/1
500 TABLET ORAL ONCE
Status: COMPLETED | OUTPATIENT
Start: 2021-10-31 | End: 2021-10-31

## 2021-10-31 RX ORDER — CEFUROXIME AXETIL 250 MG/1
250 TABLET ORAL 2 TIMES DAILY
Qty: 14 TABLET | Refills: 0 | Status: SHIPPED | OUTPATIENT
Start: 2021-10-31 | End: 2021-11-07

## 2021-10-31 RX ADMIN — CEFUROXIME AXETIL 500 MG: 250 TABLET ORAL at 22:47

## 2021-10-31 ASSESSMENT — PAIN SCALES - GENERAL: PAINLEVEL_OUTOF10: 7

## 2021-10-31 ASSESSMENT — PAIN DESCRIPTION - DESCRIPTORS: DESCRIPTORS: TIGHTNESS

## 2021-10-31 ASSESSMENT — PAIN DESCRIPTION - PAIN TYPE: TYPE: ACUTE PAIN

## 2021-10-31 ASSESSMENT — PAIN DESCRIPTION - LOCATION: LOCATION: CHEST

## 2021-11-01 NOTE — ED PROVIDER NOTES
[facility]  eMERGENCY dEPARTMENT eNCOUnter      Pt Name: Betito Tavera  MRN: [id number]  [place] 1990  Date of evaluation: 10/31/2021  Provider: Adrienne Roberts MD    CHIEF COMPLAINT       Chief Complaint   Patient presents with    Fever      since friday states 105.0 7 pm at home took advil         HISTORY OF PRESENT ILLNESS  (Location/Symptom, Timing/Onset, Context/Setting, Quality, Duration, Modifying Factors, Severity.)   Betito Tavera is a 28 y.o. female who presents to the emergency department planing of fever for 3 days. She states at 7 PM tonight she had a temperature of 105. She states she took Advil. States she is a little bit of a runny nose. She denies sore throat or earache. She states she has a mild nonproductive cough. She has some tightness in her chest and back. She denies abdominal pain, nausea, vomiting, or diarrhea. She has no frequency urgency or dysuria. She is currently on her menses. She did not get the flu vaccine. She did not get the Covid vaccine. She did have Covid about a year ago. Nursing Notes were reviewed and I agree. REVIEW OF SYSTEMS    (2-9 systems for level 4, 10 or more for level 5)     Dental: Fever for 3 days. HEENT: Mild nasal congestion. No sore throat or earache. Cardiovascular: Some tightness in her chest and back. No pain. Pulmonary: Mild nonproductive cough. No shortness of breath. GI: No abdominal pain, nausea, vomiting, or diarrhea. : No frequency urgency or dysuria. She is currently on her menses. Skin: No rash. Neuro: No headache or dizziness. Except as noted above the remainder of the review of systems was reviewed and negative.        PAST MEDICAL HISTORY         Diagnosis Date    Abnormal Pap smear of cervix     will biopsy post partum    Anxiety     Chickenpox     Depression     HX OTHER MEDICAL     mild leg length and hand size discrepancy    Hypersomnia     IBS (irritable bowel syndrome)     Lactose intolerance     Mental disorder     Depression; not medicated currently    Ovarian cyst        SURGICAL HISTORY           Procedure Laterality Date    TUBAL LIGATION         CURRENT MEDICATIONS       Previous Medications    ARMODAFINIL (NUVIGIL) 150 MG TABS TABLET    One tab QAM PRN    BIOTIN W/ VITAMINS C & E (HAIR/SKIN/NAILS PO)    Take by mouth    FEXOFENADINE-PSEUDOEPHEDRINE (ALLEGRA-D 24 HOUR PO)    Take 1 tablet by mouth once as needed    FLUOXETINE (PROZAC) 20 MG CAPSULE    Take 3 capsules by mouth daily    LAMOTRIGINE (LAMICTAL) 25 MG TABLET    Take 2 tablets by mouth daily    PROPRANOLOL (INDERAL) 10 MG TABLET    Take 1 tablet by mouth 3 times daily as needed (anxiety)    QUETIAPINE (SEROQUEL) 25 MG TABLET    Take 1 tablet by mouth nightly       ALLERGIES     Depo-provera [medroxyprogesterone acetate] and Promethazine    FAMILY HISTORY           Problem Relation Age of Onset    Arthritis Mother     Depression Mother     Arthritis Father     Depression Father     Hearing Loss Sister     Learning Disabilities Brother     Mental Retardation Brother     Cancer Maternal Grandmother     Cancer Paternal Grandfather     Kidney Disease Paternal Grandfather      Family Status   Relation Name Status    Mother  (Not Specified)    Father  (Not Specified)    Sister  (Not Specified)    Brother  (Not Specified)    MGM  (Not Specified)    PGF  (Not Specified)        SOCIAL HISTORY      reports that she has been smoking cigarettes. She has been smoking about 0.25 packs per day. She has never used smokeless tobacco. She reports current alcohol use of about 1.0 standard drinks of alcohol per week. She reports current drug use. Drug: Marijuana.     PHYSICAL EXAM    (up to 7 for level 4, 8 or more for level 5)     ED Triage Vitals [10/31/21 2138]   BP Temp Temp Source Pulse Resp SpO2 Height Weight   107/64 98.3 °F (36.8 °C) Oral 96 14 97 % 5' 1\" (1.549 m) 167 lb 1 oz (75.8 kg) General: Alert well-appearing female no acute distress. Head: Atraumatic and normocephalic. Eyes: No conjunctival injection. No pallor. Pupils equal round reactive. No discharge. ENT: TMs are normal.  Nose clear. Oropharynx moist without erythema. Neck: Supple, nontender, no meningismus. No adenopathy. Heart: Regular rate and rhythm. No murmurs or gallops noted. Lungs: Breath sounds equal bilaterally and clear. Abdomen: Soft, nondistended, nontender. No masses organomegaly. Bowel sounds are normal.  No flank tenderness. Musculoskeletal: No lower extremity edema. Intact symmetrical distal pulses. Skin: Warm and dry, good turgor. No pallor or cyanosis. No diaphoresis. No rash. Neuro: Awake, alert, oriented. Symmetrical reactive pupils. Intact extraocular movements. Normal gait. No ataxia. DIAGNOSTIC RESULTS     RADIOLOGY:   Non-plain film images such as CT, Ultrasound and MRI are read by the radiologist. Plain radiographic images are visualized and preliminarily interpreted by Meera Cazares MD with the below findings:      Interpretation per the Radiologist below, if available at the time of this note:    XR CHEST (2 VW)   Final Result   Stable chest with no acute abnormality seen.              LABS:  Labs Reviewed   URINE RT REFLEX TO CULTURE - Abnormal; Notable for the following components:       Result Value    Bilirubin Urine SMALL (*)     Ketones, Urine 80 (*)     Blood, Urine MODERATE (*)     Protein, UA 30 (*)     Leukocyte Esterase, Urine TRACE (*)     All other components within normal limits    Narrative:     Performed at:  [facility]) - [facility]  [address] [facility]   Phone [phone]   MICROSCOPIC URINALYSIS - Abnormal; Notable for the following components:    Hyaline Casts, UA 3-5 (*)     Mucus, UA 2+ (*)     WBC, UA 10-20 (*)     Bacteria, UA Rare (*)     All other components within normal limits Narrative:     Performed at:  [address] [facility]  [address] [facility]   Phone [phone]   RAPID INFLUENZA A/B ANTIGENS    Narrative:     Performed at:  [address] [facility]  [address] [facility]   Phone [phone], RAPID    Narrative:     Performed at:  [address]  [address] [facility]   Phone [phone]   CULTURE, URINE       All other labs were within normal range or not returned as of this dictation. EMERGENCY DEPARTMENT COURSE and DIFFERENTIAL DIAGNOSIS/MDM:   Vitals:    Vitals:    10/31/21 2138   BP: 107/64   Pulse: 96   Resp: 14   Temp: 98.3 °F (36.8 °C)   TempSrc: Oral   SpO2: 97%   Weight: 167 lb 1 oz (75.8 kg)   Height: 5' 1\" (1.549 m)       This patient presents with a history of reported fever for 3 days. She states it was up to 105 earlier today. She is afebrile here. She states she did take some ibuprofen. Her only other complaints are some mild rhinorrhea, a minimal cough and some tightness in her chest and back. Her exam is benign. She clinically looks well. Her Covid screen is negative. Her flu screen is negative. Her urine shows 10-20 white cells with 2-5 epithelial cells with ketones of 80. Her chest x-ray is negative. She will be treated for urinary tract infection. She was given a dose of Ceftin here. She will be discharged with a prescription for Ceftin sent to her pharmacy. She was instructed to follow-up in 3 days with her primary care provider for persistent fever or any other new symptoms of concern. She was instructed to return here for worsening of symptoms or new symptoms of concern. Test results, diagnosis, and treatment plan were discussed with the patient. She understands the treatment plan and follow-up as discussed. PROCEDURES:  None    FINAL IMPRESSION      1.  Urinary tract infection without hematuria, site unspecified          DISPOSITION/PLAN   DISPOSITION Decision To Discharge 10/31/2021 10:33:46 PM      PATIENT REFERRED TO:  Flory Major MD  [address] [place] [address]  [phone]    In 3 days  For persistent fever, new symptoms of concern.       DISCHARGE MEDICATIONS:  New Prescriptions    CEFUROXIME (CEFTIN) 250 MG TABLET    Take 1 tablet by mouth 2 times daily for 7 days       (Please note that portions of this note were completed with a voice recognition program.  Efforts were made to edit the dictations but occasionally words are mis-transcribed.)    Jason Frederick MD  Attending Emergency Physician        Windy Lock MD  10/31/21 5056

## 2021-11-01 NOTE — ED TRIAGE NOTES
Complains of fever and chest tightness wore with deep breath, since Thursday with occasional cough states her temp at 7 pm  Was  105.0 with temporal thermometer at home and took advil at that time.

## 2021-11-01 NOTE — ED TRIAGE NOTES
[facility]  Nasal and throat swabs collected

## 2021-11-02 LAB — URINE CULTURE, ROUTINE: NORMAL

## 2021-11-23 ENCOUNTER — APPOINTMENT (OUTPATIENT)
Dept: GENERAL RADIOLOGY | Age: 31
End: 2021-11-23
Payer: COMMERCIAL

## 2021-11-23 ENCOUNTER — HOSPITAL ENCOUNTER (EMERGENCY)
Age: 31
Discharge: HOME | End: 2021-11-23
Attending: EMERGENCY MEDICINE
Payer: COMMERCIAL

## 2021-11-23 VITALS
SYSTOLIC BLOOD PRESSURE: 142 MMHG | WEIGHT: 177.69 LBS | OXYGEN SATURATION: 96 % | BODY MASS INDEX: 33.55 KG/M2 | HEIGHT: 61 IN | TEMPERATURE: 98.2 F | RESPIRATION RATE: 20 BRPM | DIASTOLIC BLOOD PRESSURE: 85 MMHG | HEART RATE: 94 BPM

## 2021-11-23 DIAGNOSIS — T14.90XA INHALATION INJURY: Primary | ICD-10-CM

## 2021-11-23 DIAGNOSIS — J98.01 BRONCHOSPASM, ACUTE: ICD-10-CM

## 2021-11-23 PROCEDURE — 6370000000 HC RX 637 (ALT 250 FOR IP): Performed by: EMERGENCY MEDICINE

## 2021-11-23 PROCEDURE — 93005 ELECTROCARDIOGRAM TRACING: CPT | Performed by: EMERGENCY MEDICINE

## 2021-11-23 PROCEDURE — 94640 AIRWAY INHALATION TREATMENT: CPT

## 2021-11-23 PROCEDURE — 71045 X-RAY EXAM CHEST 1 VIEW: CPT

## 2021-11-23 PROCEDURE — 96374 THER/PROPH/DIAG INJ IV PUSH: CPT

## 2021-11-23 PROCEDURE — 36415 COLL VENOUS BLD VENIPUNCTURE: CPT

## 2021-11-23 PROCEDURE — 99284 EMERGENCY DEPT VISIT MOD MDM: CPT

## 2021-11-23 PROCEDURE — 2700000000 HC OXYGEN THERAPY PER DAY

## 2021-11-23 PROCEDURE — 6360000002 HC RX W HCPCS: Performed by: EMERGENCY MEDICINE

## 2021-11-23 RX ORDER — ALBUTEROL SULFATE 90 UG/1
2 AEROSOL, METERED RESPIRATORY (INHALATION) EVERY 4 HOURS PRN
Qty: 18 G | Refills: 0 | Status: SHIPPED | OUTPATIENT
Start: 2021-11-23 | End: 2022-10-04

## 2021-11-23 RX ORDER — IPRATROPIUM BROMIDE AND ALBUTEROL SULFATE 2.5; .5 MG/3ML; MG/3ML
1 SOLUTION RESPIRATORY (INHALATION) ONCE
Status: COMPLETED | OUTPATIENT
Start: 2021-11-23 | End: 2021-11-23

## 2021-11-23 RX ORDER — DEXAMETHASONE SODIUM PHOSPHATE 10 MG/ML
10 INJECTION INTRAMUSCULAR; INTRAVENOUS ONCE
Status: COMPLETED | OUTPATIENT
Start: 2021-11-23 | End: 2021-11-23

## 2021-11-23 RX ORDER — ALBUTEROL SULFATE 90 UG/1
2 AEROSOL, METERED RESPIRATORY (INHALATION) EVERY 4 HOURS PRN
Qty: 18 G | Refills: 0 | Status: SHIPPED | OUTPATIENT
Start: 2021-11-23 | End: 2021-11-23 | Stop reason: SDUPTHER

## 2021-11-23 RX ADMIN — DEXAMETHASONE SODIUM PHOSPHATE 10 MG: 10 INJECTION INTRAMUSCULAR; INTRAVENOUS at 19:23

## 2021-11-23 RX ADMIN — IPRATROPIUM BROMIDE AND ALBUTEROL SULFATE 1 AMPULE: .5; 2.5 SOLUTION RESPIRATORY (INHALATION) at 19:30

## 2021-11-23 ASSESSMENT — PAIN DESCRIPTION - FREQUENCY: FREQUENCY: CONTINUOUS

## 2021-11-23 ASSESSMENT — PAIN DESCRIPTION - PROGRESSION: CLINICAL_PROGRESSION: GRADUALLY WORSENING

## 2021-11-23 ASSESSMENT — PAIN - FUNCTIONAL ASSESSMENT: PAIN_FUNCTIONAL_ASSESSMENT: PREVENTS OR INTERFERES SOME ACTIVE ACTIVITIES AND ADLS

## 2021-11-23 ASSESSMENT — PAIN SCALES - GENERAL: PAINLEVEL_OUTOF10: 10

## 2021-11-23 ASSESSMENT — PAIN DESCRIPTION - PAIN TYPE: TYPE: ACUTE PAIN

## 2021-11-23 ASSESSMENT — PAIN DESCRIPTION - DESCRIPTORS: DESCRIPTORS: BURNING;TIGHTNESS

## 2021-11-23 ASSESSMENT — PAIN DESCRIPTION - LOCATION: LOCATION: CHEST;THROAT

## 2021-11-23 ASSESSMENT — PAIN DESCRIPTION - ONSET: ONSET: SUDDEN

## 2021-11-23 NOTE — ED NOTES
Bed: [facility]  Expected date: 11/23/21  Expected time:   Means of arrival: [address] [facility]  Comments:  Chikis Hughes RN  11/23/21 7122

## 2021-11-24 LAB
EKG ATRIAL RATE: 89 BPM
EKG DIAGNOSIS: NORMAL
EKG P AXIS: 30 DEGREES
EKG P-R INTERVAL: 108 MS
EKG Q-T INTERVAL: 362 MS
EKG QRS DURATION: 72 MS
EKG QTC CALCULATION (BAZETT): 440 MS
EKG R AXIS: 53 DEGREES
EKG T AXIS: 37 DEGREES
EKG VENTRICULAR RATE: 89 BPM

## 2021-11-24 PROCEDURE — 93010 ELECTROCARDIOGRAM REPORT: CPT | Performed by: INTERNAL MEDICINE

## 2021-11-24 NOTE — ED PROVIDER NOTES
Emergency Physician Note        Note Open Time: 8:41 PM EST    Chief Complaint  Toxic Inhalation (pt came in after mixing bleach with lysol toilet  pt coughing and c/o chest burning)       History of Present Illness  Jb Hughes is a 28 y.o. female who presents to the ED for inhalation injury. Patient reports that about half an hour prior to arrival she was cleaning the bathroom and mixed bleach and a toilet bowl . Shortly thereafter she felt short of breath and had chest pain. She did not realize that this will be a toxic combination. She has no history of heart or lung disease but is a smoker. 10 systems reviewed, pertinent positives per HPI otherwise noted to be negative    I have reviewed the following from the nursing documentation:      Prior to Admission medications    Medication Sig Start Date End Date Taking?  Authorizing Provider   albuterol sulfate HFA (PROVENTIL HFA) 108 (90 Base) MCG/ACT inhaler Inhale 2 puffs into the lungs every 4 hours as needed for Wheezing or Shortness of Breath Please provide pt c spacer as well 11/23/21 11/23/22 Yes Isaac Paredes MD   lamoTRIgine (LAMICTAL) 25 MG tablet Take 2 tablets by mouth daily 10/22/21   Johann Potts MD   FLUoxetine (PROZAC) 20 MG capsule Take 3 capsules by mouth daily 10/22/21   Johann Potts MD   QUEtiapine (SEROQUEL) 25 MG tablet Take 1 tablet by mouth nightly 10/14/21   SAURAV Couch CNP   propranolol (INDERAL) 10 MG tablet Take 1 tablet by mouth 3 times daily as needed (anxiety) 9/24/21   SAURAV Couch CNP   Armodafinil (NUVIGIL) 150 MG TABS tablet One tab QAM PRN  Patient not taking: Reported on 9/24/2021 9/21/21 9/21/24  Annamarie Mera MD   Biotin w/ Vitamins C & E (HAIR/SKIN/NAILS PO) Take by mouth  Patient not taking: Reported on 9/24/2021    Historical Provider, MD   Fexofenadine-Pseudoephedrine (ALLEGRA-D 24 HOUR PO) Take 1 tablet by mouth once as needed Resource Strain:     Difficulty of Paying Living Expenses: Not on file   Food Insecurity:     Worried About Running Out of Food in the Last Year: Not on file    Fritz of Food in the Last Year: Not on file   Transportation Needs:     Lack of Transportation (Medical): Not on file    Lack of Transportation (Non-Medical): Not on file   Physical Activity:     Days of Exercise per Week: Not on file    Minutes of Exercise per Session: Not on file   Stress:     Feeling of Stress : Not on file   Social Connections:     Frequency of Communication with Friends and Family: Not on file    Frequency of Social Gatherings with Friends and Family: Not on file    Attends Jew Services: Not on file    Active Member of [address] [place] [address] or Organizations: Not on file    Attends Club or Organization Meetings: Not on file    Marital Status: Not on file   Intimate Partner Violence:     Fear of Current or Ex-Partner: Not on file    Emotionally Abused: Not on file    Physically Abused: Not on file    Sexually Abused: Not on file   Housing Stability:     Unable to Pay for Housing in the Last Year: Not on file    Number of [place] in the Last Year: Not on file    Unstable Housing in the Last Year: Not on file       Nursing notes reviewed. ED Triage Vitals [11/23/21 1849]   Enc Vitals Group      BP (!) 148/88      Pulse 89      Resp 24      Temp 98.2 °F (36.8 °C)      Temp Source Oral      SpO2 (!) 89 %      Weight 177 lb 11.1 oz (80.6 kg)      Height 5' 1\" (1.549 m)      Head Circumference       Peak Flow       Pain Score       Pain Loc       Pain Edu? Excl. in [address]? GENERAL:  Awake, alert. Well developed, well nourished with mild respiratory distress. HENT:  Normocephalic, Atraumatic, moist mucous membranes. No tongue, lip or uvular edema. EYES:  Pupils equal round and reactive to light, Conjunctiva normal, extraocular movements normal.  NECK:  No meningeal signs, Supple.   CHEST: Tachycardic and regular, chest wall non-tender. LUNGS: Poor air exchange with scattered wheeze. ABDOMEN:  Soft, non-tender, no rebound, rigidity or guarding, non-distended, normal bowel sounds. No costovertebral angle tenderness to palpation. BACK:  No tenderness. EXTREMITIES:  Normal range of motion, no edema, no bony tenderness, no deformity, distal pulses present. SKIN: Warm, dry and intact. NEUROLOGIC: Normal mental status. Moving all extremities to command. LABS and DIAGNOSTIC RESULTS  EKG  The Ekg interpreted by me shows  normal sinus rhythm with a rate of 89  Axis is   Normal  QTc is  normal  Intervals and Durations are unremarkable. ST Segments: normal  Delta waves, Brugada Syndrome, and Short ID are not present. No significant change from prior EKG dated 2/15/13    RADIOLOGY  X-RAYS:  I have reviewed radiologic plain film image(s). ALL OTHER NON-PLAIN FILM IMAGES SUCH AS CT, ULTRASOUND AND MRI HAVE BEEN READ BY THE RADIOLOGIST. XR CHEST PORTABLE   Final Result   No acute process by radiograph. MEDICAL DECISION MAKING    The total Critical Care time is 40 minutes which excludes separately billable procedures. The critical care was concerning treatment with supplemental oxygen and nebulized beta agonist for treatment of respiratory distress. This time is exclusive of any time documented by any other providers. I advised the patient to return to the emergency department immediately for any new or worsening symptoms, such as chest pain or shortness of breath. The patient voiced agreement and understanding of the treatment plan. No results found for this visit on 11/23/21.     I estimate there is LOW risk for ABDOMINAL AORTIC ANEURYSM, CAUDA EQUINA or CENTRAL CORD SYNDROME, COMPARTMENT SYNDROME, EPIDURAL MASS LESION, HERNIATED DISK CAUSING SEVERE STENOSIS, INTRACRANIAL HEMORRHAGE, INTRA-ABDOMINAL INJURY, PERFORATED BOWEL, SUBDURAL HEMATOMA, TENDON or NEUROVASCULAR INJURY, or a THORACIC AORTIC DISSECTION, thus I consider the discharge disposition reasonable. Also, there is no evidence or peritonitis, sepsis, or toxicity. Delmy Watson and I have discussed the diagnosis and risks, and we agree with discharging home to follow-up with their primary doctor. We also discussed returning to the Emergency Department immediately if new or worsening symptoms occur. We have discussed the symptoms which are most concerning (e.g., bloody stool, fever, changing or worsening pain, vomiting) that necessitate immediate return. Final Impression    1. Inhalation injury    2. Bronchospasm, acute        Blood pressure 135/84, pulse 95, temperature 98.2 °F (36.8 °C), temperature source Oral, resp. rate 29, height 5' 1\" (1.549 m), weight 177 lb 11.1 oz (80.6 kg), last menstrual period 10/26/2021, SpO2 97 %, unknown if currently breastfeeding. Patient was given scripts for the following medications. I counseled patient how to take these medications. New Prescriptions    ALBUTEROL SULFATE HFA (PROVENTIL HFA) 108 (90 BASE) MCG/ACT INHALER    Inhale 2 puffs into the lungs every 4 hours as needed for Wheezing or Shortness of Breath Please provide pt c spacer as well       Disposition  Pt is in good condition upon Discharge to home. This chart was generated using the [address] [place] [address] [address] dictation system. I created this record but it may contain dictation errors.          Frieda Molina MD  11/23/21 2049

## 2021-11-29 RX ORDER — QUETIAPINE FUMARATE 25 MG/1
25 TABLET, FILM COATED ORAL NIGHTLY
Qty: 30 TABLET | Refills: 0 | Status: SHIPPED | OUTPATIENT
Start: 2021-11-29 | End: 2022-03-14

## 2021-11-29 NOTE — TELEPHONE ENCOUNTER
Ricardo Mcdaniels is out sick all week. Can you please fill prescription? Medication:   Requested Prescriptions     Pending Prescriptions Disp Refills    QUEtiapine (SEROQUEL) 25 MG tablet 30 tablet 1     Sig: Take 1 tablet by mouth nightly        Last Filled:      Patient Phone Number: [phone] (home)     Last appt: Visit date not found   Next appt: Visit date not found    Last OARRS:   RX Monitoring 11/5/2021   Periodic Controlled Substance Monitoring No signs of potential drug abuse or diversion identified.

## 2021-12-02 NOTE — DISCHARGE SUMMARY
Discharge Summary   Admit Date: 10/18/2021   Discharge Date:  10/21/2021  Spent over 40 minutes with patient and staff on [facility]   Final Dx:     axis I: MDD severe with psychosis  Axis 2: deferred   Josseline 3: See Medical History  d Axis 4: Problems with primary support group and Other psychosocial and environmental problems      Condition on DC  Mood and affect are stable and pt is not suicidal   VITALS:  /67   Pulse 93   Temp 98.4 °F (36.9 °C) (Temporal)   Resp 16   Ht 5' 1\" (1.549 m)   Wt 165 lb (74.8 kg)   LMP 10/01/2021 (Approximate)   SpO2 98%   BMI 31.18 kg/m²   Brief Summary Present Illness   29 y/o wf with hx of depression and anxiety that presented to ed for worsening depression and a/v/h. Pt stated that she has been having worsening of her sx's for the past 2 weeks. Pt stated that she has been experiencing visual hallucinatiosn which describes as a mouse running across the floor or shadow ppl from corner of her eyes. Pt also stated that she has been having a/h which she describes as someone speaking in the other room. Pt stated that they have been trying medications abilify cause  Her to be anxious, not able to stop moving and smoking a lot. Geodon caused feelings of depersonalization and currently seroquel which she feels slightly better but too sleepy. Pt stated that she has been on prozac since age 22-22 y/o and she reports that it works for her. Pt describes inderal as good for her anxiety. Pt stated that she has been isolating, no motivation, dec concentration, feeling detach, anhedonia, hypersomnia and describes problems staying asleep and interrupted sleep, dec energy. Pt denies jayant or hypomania. Hospital Course Pt was admitted for depression, anxiety and hallucinations which appears to be mood associated. Pt was started on lamictal and seroquel AND RESTARTED ON PROZAC. Pt attended grps and she was social with peers and staff.  Pt had uneventful hospital course with resolution of si and improvements of her depression. Patient appears to be in stable condition and close to their baseline functioning. The patient denies suicidal or homicidal ideations and is showing future orientation. Patient no longer presented an imminent risk of danger to themselves and/or others. At the time of discharge it appears that the patient has received the maximum medical benefit from this hospitalization and can be appropriately managed with community treatment.       PE: (reviewed) and labs (see medical H&PE)  Labs:    Admission on 10/18/2021, Discharged on 10/21/2021   Component Date Value Ref Range Status    Acetaminophen Level 10/18/2021 <5* 10 - 30 ug/mL Final    Comment: Therapeutic Range: 10.0-30.0 ug/mL  Toxic: >=150 ug/mL      WBC 10/18/2021 9.6  4.0 - 11.0 K/uL Final    RBC 10/18/2021 4.92  4.00 - 5.20 M/uL Final    Hemoglobin 10/18/2021 12.7  12.0 - 16.0 g/dL Final    Hematocrit 10/18/2021 38.8  36.0 - 48.0 % Final    MCV 10/18/2021 78.8* 80.0 - 100.0 fL Final    MCH 10/18/2021 25.9* 26.0 - 34.0 pg Final    MCHC 10/18/2021 32.8  31.0 - 36.0 g/dL Final    RDW 10/18/2021 16.6* 12.4 - 15.4 % Final    Platelets 76/99/3351 362  135 - 450 K/uL Final    MPV 10/18/2021 9.0  5.0 - 10.5 fL Final    Neutrophils % 10/18/2021 67.6  % Final    Lymphocytes % 10/18/2021 19.4  % Final    Monocytes % 10/18/2021 8.5  % Final    Eosinophils % 10/18/2021 3.7  % Final    Basophils % 10/18/2021 0.8  % Final    Neutrophils Absolute 10/18/2021 6.5  1.7 - 7.7 K/uL Final    Lymphocytes Absolute 10/18/2021 1.9  1.0 - 5.1 K/uL Final    Monocytes Absolute 10/18/2021 0.8  0.0 - 1.3 K/uL Final    Eosinophils Absolute 10/18/2021 0.4  0.0 - 0.6 K/uL Final    Basophils Absolute 10/18/2021 0.1  0.0 - 0.2 K/uL Final    Sodium 10/18/2021 136  136 - 145 mmol/L Final    Potassium 10/18/2021 3.9  3.5 - 5.1 mmol/L Final    Chloride 10/18/2021 103  99 - 110 mmol/L Final    CO2 10/18/2021 22  21 - 32 mmol/L Final  Anion Gap 10/18/2021 11  3 - 16 Final    Glucose 10/18/2021 80  70 - 99 mg/dL Final    BUN 10/18/2021 7  7 - 20 mg/dL Final    CREATININE 10/18/2021 0.7  0.6 - 1.1 mg/dL Final    GFR Non- 10/18/2021 >60  >60 Final    Comment: >60 mL/min/1.73m2 EGFR, calc. for ages 25 and older using the  MDRD formula (not corrected for weight), is valid for stable  renal function.  GFR  10/18/2021 >60  >60 Final    Comment: Chronic Kidney Disease: less than 60 ml/min/1.73 sq.m. Kidney Failure: less than 15 ml/min/1.73 sq.m. Results valid for patients 18 years and older.  Calcium 10/18/2021 9.4  8.3 - 10.6 mg/dL Final    Total Protein 10/18/2021 7.9  6.4 - 8.2 g/dL Final    Albumin 10/18/2021 4.4  3.4 - 5.0 g/dL Final    Albumin/Globulin Ratio 10/18/2021 1.3  1.1 - 2.2 Final    Total Bilirubin 10/18/2021 0.3  0.0 - 1.0 mg/dL Final    Alkaline Phosphatase 10/18/2021 96  40 - 129 U/L Final    ALT 10/18/2021 20  10 - 40 U/L Final    AST 10/18/2021 16  15 - 37 U/L Final    Globulin 10/18/2021 3.5  Not Established g/dL Final    Amphetamine Screen, Urine 10/18/2021 Neg  Negative <1000ng/mL Final    Barbiturate Screen, Ur 10/18/2021 Neg  Negative <200 ng/mL Final    Benzodiazepine Screen, Urine 10/18/2021 Neg  Negative <200 ng/mL Final    Cannabinoid Scrn, Ur 10/18/2021 Neg  Negative <50 ng/mL Final    Cocaine Metabolite Screen, Urine 10/18/2021 Neg  Negative <300 ng/mL Final    Opiate Scrn, Ur 10/18/2021 Neg  Negative <300 ng/mL Final    Comment: \"Therapeutic levels of pain medication, especially oxycontin and synthetic  opioids, may not be detected by this Methodology. Pain management screen  panel  Drug panel-PM-Hi Res Ur, Interp (PAIN) should be considered for drug  monitoring \".       PCP Screen, Urine 10/18/2021 Neg  Negative <25 ng/mL Final    Methadone Screen, Urine 10/18/2021 Neg  Negative <300 ng/mL Final    Propoxyphene Scrn, Ur 10/18/2021 Neg  Negative <300 ng/mL Final    Oxycodone Urine 10/18/2021 Neg  Negative <100 ng/ml Final    pH, UA 10/18/2021 6.5   Final    Comment: Urine pH less than 5.0 or greater than 8.0 may indicate sample adulteration. Another sample should be collected if clinically  indicated.  Drug Screen Comment: 10/18/2021 see below   Final    Comment: This method is a screening test to detect only these drug  classes as part of a medical workup. Confirmatory testing  by another method should be ordered if clinically indicated.  Ethanol Lvl 10/18/2021 None Detected  mg/dL Final    Comment:    None Detected  Conversion factor:  100 mg/dl = .100 g/dl  For Medical Purposes Only      Color, UA 10/18/2021 Yellow  Straw/Yellow Final    Clarity, UA 10/18/2021 Clear  Clear Final    Glucose, Ur 10/18/2021 Negative  Negative mg/dL Final    Bilirubin Urine 10/18/2021 Negative  Negative Final    Ketones, Urine 10/18/2021 Negative  Negative mg/dL Final    Specific Gravity, UA 10/18/2021 <=1.005  1.005 - 1.030 Final    Blood, Urine 10/18/2021 Negative  Negative Final    pH, UA 10/18/2021 6.5  5.0 - 8.0 Final    Protein, UA 10/18/2021 Negative  Negative mg/dL Final    Urobilinogen, Urine 10/18/2021 0.2  <2.0 E.U./dL Final    Nitrite, Urine 10/18/2021 Negative  Negative Final    Leukocyte Esterase, Urine 10/18/2021 SMALL* Negative Final    Microscopic Examination 10/18/2021 YES   Final    Urine Type 10/18/2021 NotGiven   Final    Urine received in a container without preservatives.     HCG(Urine) Pregnancy Test 10/18/2021 Negative  Detects HCG level >20 MIU/mL Final    Comment: Note:  Always repeat results in question with a serum  quantitative pregnancy test. A serum hCG is positive  2-5 days before the urine hCG test.      Salicylate, Serum 18/45/1151 <0.3* 15.0 - 30.0 mg/dL Final    Comment: Therapeutic Range: 15.0-30.0 mg/dL  Toxic: >30.0 mg/dL      SARS-CoV-2 RNA, RT PCR 10/18/2021 NOT DETECTED  NOT DETECTED Final    Comment: Not Detected results do not preclude SARS-CoV-2 infection and  should not be used as the sole basis for patient management  decisions. Results must be combined with clinical observations,  patient history, and epidemiological information. Testing was performed using [employer]AVI SARS-CoV-2 and Influenza A/B  nucleic acid assay. This test is a multiplex Real-Time Reverse  Transcriptase Polymerase Chain Reaction (RT-PCR)-based in vitro  diagnostic test intended for the qualitative detection of nucleic  acids from SARS-CoV-2, influenza A, and influenza B in nasopharyngeal  and nasal swab specimens for use under the FDAs Emergency Use  Authorization (EUA) only.     Patient Fact Sheet:  FindDrives.pl  Provider Fact Sheet: FindDrives.pl  EUA: FindDrives.pl  IFU: FindDrives.pl    Methodology:  RT-PCR      INFLUENZA A 10/18/2021 NOT DETECTED  NOT DETECTED Final    INFLUENZA B 10/18/2021 NOT DETECTED  NOT DETECTED Final    WBC, UA 10/18/2021 3-5  0 - 5 /HPF Final    RBC, UA 10/18/2021 0-2  0 - 4 /HPF Final    Epithelial Cells, UA 10/18/2021 2-5  0 - 5 /HPF Final        Mental Status Exam at Discharge:  Level of consciousness:  awake  Appearance:  well-appearing, in chair, good grooming and good hygiene well-developed, well-nourished  Behavior/Motor:  no abnormalities noted normal gait and station AIMS: 0  Attitude toward examiner:  cooperative, attentive and good eye contact  Speech:  spontaneous, normal rate, normal volume and well articulated  Mood:  dysthymic  Affect:  mood congruent Anxiety: mild  Hallucinations: Absent  Thought processes:  coherent Attention span, Concentration & Attention:  attention span and concentration were age appropriate  Thought content:  Reality based no evidence of delusions OCD: none    Insight: normal insight and judgment Cognition:  oriented to person, place, and time  Inhale Digital of Knowledge: average  IQ:average Memory: intact  Suicide:  No specific plan to harm self  Sleep: sleeps through the night  Appetite: ok   Reassess No Risk:  no specific plan to harm self Pt has phone numbers to contact if suicidal thoughts recur and states pt will return to the hospital if suicidal feelings return.    Hospital Routine Meds:     Hospital PRN Meds:    Discharge Meds:    Discharge Medication List as of 10/21/2021 12:38 PM           Details   lamoTRIgine (LAMICTAL) 25 MG tablet Take 2 tablets by mouth daily, Disp-60 tablet, R-0Normal              Details   FLUoxetine (PROZAC) 20 MG capsule Take 3 capsules by mouth daily, Disp-90 capsule, R-0Normal              Details   QUEtiapine (SEROQUEL) 25 MG tablet Take 1 tablet by mouth nightly, Disp-30 tablet, R-1Normal      propranolol (INDERAL) 10 MG tablet Take 1 tablet by mouth 3 times daily as needed (anxiety), Disp-90 tablet, R-2Normal      Armodafinil (NUVIGIL) 150 MG TABS tablet One tab QAM PRN, Disp-30 tablet, R-0Normal      Biotin w/ Vitamins C & E (HAIR/SKIN/NAILS PO) Take by mouthHistorical Med      Fexofenadine-Pseudoephedrine (ALLEGRA-D 24 HOUR PO) Take 1 tablet by mouth once as neededHistorical Med                   Disposition - Residence      Follow Up:  See Discharge Instructions

## 2022-03-14 RX ORDER — QUETIAPINE FUMARATE 25 MG/1
TABLET, FILM COATED ORAL
Qty: 30 TABLET | Refills: 0 | Status: SHIPPED | OUTPATIENT
Start: 2022-03-14 | End: 2022-03-29 | Stop reason: SDUPTHER

## 2022-03-18 NOTE — PROGRESS NOTES
Lou Ellis (:  1990) is a 33 y.o. female,Established patient, here for evaluation of the following chief complaint(s):  Depression (doing really well on meds) and Back Pain (has chronic back issues. has gotten worse over the last 6 months. low back pain radiating into both legs. no numbness. sharp pain and then aches)         ASSESSMENT/PLAN:  1. Mood disorder (HCC)  Stable on current medications  -     FLUoxetine (PROZAC) 20 MG capsule; Take 3 capsules by mouth daily, Disp-90 capsule, R-0Normal  -     lamoTRIgine (LAMICTAL) 25 MG tablet; Take 2 tablets by mouth daily, Disp-60 tablet, R-0Normal  -     QUEtiapine (SEROQUEL) 25 MG tablet; TAKE ONE TABLET BY MOUTH ONCE NIGHTLY, Disp-30 tablet, R-0CALL FOR APPOINTMENTNormal  2. Anxiety  Stable on current medications  -     FLUoxetine (PROZAC) 20 MG capsule; Take 3 capsules by mouth daily, Disp-90 capsule, R-0Normal  -     propranolol (INDERAL) 10 MG tablet; Take 1 tablet by mouth 3 times daily as needed (anxiety), Disp-90 tablet, R-2Normal  -     QUEtiapine (SEROQUEL) 25 MG tablet; TAKE ONE TABLET BY MOUTH ONCE NIGHTLY, Disp-30 tablet, R-0CALL FOR APPOINTMENTNormal  3. Tobacco dependence  Discussed smoking cessation  4. Obesity (BMI 30.0-34. 9)  Discussed importance of diet/exercise  -     Comprehensive Metabolic Panel; Future  -     TSH; Future  -     Lipid Panel; Future  5. Seasonal allergic rhinitis, unspecified trigger  Stable on OTC meds  6. Chronic bilateral low back pain without sciatica  Provided with back exercises, discussed weight loss, referral place to [facility]  7. Hair loss  Will get TSH today      Return in about 6 months (around 2022) for mood. Subjective   SUBJECTIVE/OBJECTIVE:  Mood is good, did spend a few days inpatient in 2021, but reports that she has been stable and feeling good since.     Lower back pain- sharp shooting pain from lower back into hips when going from bending over to standing up. She reports that this occurs once or twice/week. Takes 800mg ibuprofen for pain relief a few times/week. Obesity- does not follow a diet or exercise routine. Seasonal allergies- well controlled with allegra OTC    Reports that she has been having increased hair loss- \"handfuls come out when in the shower\"      Review of Systems   Constitutional: Negative for activity change and fatigue. HENT: Negative for rhinorrhea, sinus pressure and sinus pain. Respiratory: Negative for cough and shortness of breath. Cardiovascular: Negative for chest pain and leg swelling. Gastrointestinal: Negative for abdominal pain, constipation, diarrhea, nausea and vomiting. Musculoskeletal: Positive for back pain. Skin: Negative for rash. Allergic/Immunologic: Positive for environmental allergies (well controlled with OTC meds). Neurological: Negative for dizziness, numbness and headaches. Psychiatric/Behavioral: The patient is not nervous/anxious. Objective   Physical Exam  Vitals reviewed. Constitutional:       Appearance: Normal appearance. HENT:      Head: Normocephalic and atraumatic. Cardiovascular:      Rate and Rhythm: Normal rate and regular rhythm. Pulses: Normal pulses. Heart sounds: Normal heart sounds. Pulmonary:      Effort: Pulmonary effort is normal.      Breath sounds: Normal breath sounds. Musculoskeletal:         General: Normal range of motion. Comments: Negative for TTP, full ROM   Skin:     General: Skin is warm and dry. Neurological:      Mental Status: She is alert and oriented to person, place, and time. Psychiatric:         Behavior: Behavior normal.         Thought Content: Thought content normal.                  An electronic signature was used to authenticate this note.     --SAURAV Murray - CNP

## 2022-03-28 DIAGNOSIS — G47.11 IDIOPATHIC HYPERSOMNIA: ICD-10-CM

## 2022-03-29 ENCOUNTER — OFFICE VISIT (OUTPATIENT)
Dept: FAMILY MEDICINE CLINIC | Age: 32
End: 2022-03-29
Payer: COMMERCIAL

## 2022-03-29 VITALS
RESPIRATION RATE: 14 BRPM | HEART RATE: 112 BPM | SYSTOLIC BLOOD PRESSURE: 126 MMHG | WEIGHT: 171.2 LBS | BODY MASS INDEX: 32.32 KG/M2 | HEIGHT: 61 IN | OXYGEN SATURATION: 98 % | DIASTOLIC BLOOD PRESSURE: 82 MMHG | TEMPERATURE: 98.4 F

## 2022-03-29 DIAGNOSIS — F41.9 ANXIETY: ICD-10-CM

## 2022-03-29 DIAGNOSIS — G47.11 IDIOPATHIC HYPERSOMNIA: ICD-10-CM

## 2022-03-29 DIAGNOSIS — M54.50 CHRONIC BILATERAL LOW BACK PAIN WITHOUT SCIATICA: ICD-10-CM

## 2022-03-29 DIAGNOSIS — E66.9 OBESITY (BMI 30.0-34.9): ICD-10-CM

## 2022-03-29 DIAGNOSIS — F39 MOOD DISORDER (HCC): Primary | ICD-10-CM

## 2022-03-29 DIAGNOSIS — G89.29 CHRONIC BILATERAL LOW BACK PAIN WITHOUT SCIATICA: ICD-10-CM

## 2022-03-29 DIAGNOSIS — J30.2 SEASONAL ALLERGIC RHINITIS, UNSPECIFIED TRIGGER: ICD-10-CM

## 2022-03-29 DIAGNOSIS — F17.200 TOBACCO DEPENDENCE: ICD-10-CM

## 2022-03-29 DIAGNOSIS — L65.9 HAIR LOSS: ICD-10-CM

## 2022-03-29 PROCEDURE — 99213 OFFICE O/P EST LOW 20 MIN: CPT

## 2022-03-29 RX ORDER — QUETIAPINE FUMARATE 25 MG/1
TABLET, FILM COATED ORAL
Qty: 30 TABLET | Refills: 0 | Status: SHIPPED | OUTPATIENT
Start: 2022-03-29 | End: 2022-04-25

## 2022-03-29 RX ORDER — LAMOTRIGINE 25 MG/1
50 TABLET ORAL DAILY
Qty: 60 TABLET | Refills: 0 | Status: SHIPPED | OUTPATIENT
Start: 2022-03-29 | End: 2022-05-16 | Stop reason: SDUPTHER

## 2022-03-29 RX ORDER — ARMODAFINIL 150 MG/1
TABLET ORAL
Qty: 30 TABLET | Refills: 5 | Status: CANCELLED | OUTPATIENT
Start: 2022-03-29 | End: 2026-03-29

## 2022-03-29 RX ORDER — ARMODAFINIL 150 MG/1
TABLET ORAL
Qty: 30 TABLET | Refills: 5 | Status: SHIPPED | OUTPATIENT
Start: 2022-03-29 | End: 2022-10-04 | Stop reason: SDUPTHER

## 2022-03-29 RX ORDER — FLUOXETINE HYDROCHLORIDE 20 MG/1
60 CAPSULE ORAL DAILY
Qty: 90 CAPSULE | Refills: 0 | Status: SHIPPED | OUTPATIENT
Start: 2022-03-29 | End: 2022-05-16 | Stop reason: SDUPTHER

## 2022-03-29 RX ORDER — ARMODAFINIL 150 MG/1
TABLET ORAL
Qty: 30 TABLET | Refills: 5 | OUTPATIENT
Start: 2022-03-29 | End: 2026-03-29

## 2022-03-29 RX ORDER — PROPRANOLOL HYDROCHLORIDE 10 MG/1
10 TABLET ORAL 3 TIMES DAILY PRN
Qty: 90 TABLET | Refills: 2 | Status: SHIPPED | OUTPATIENT
Start: 2022-03-29

## 2022-03-29 ASSESSMENT — PATIENT HEALTH QUESTIONNAIRE - PHQ9
4. FEELING TIRED OR HAVING LITTLE ENERGY: 0
9. THOUGHTS THAT YOU WOULD BE BETTER OFF DEAD, OR OF HURTING YOURSELF: 0
SUM OF ALL RESPONSES TO PHQ QUESTIONS 1-9: 0
10. IF YOU CHECKED OFF ANY PROBLEMS, HOW DIFFICULT HAVE THESE PROBLEMS MADE IT FOR YOU TO DO YOUR WORK, TAKE CARE OF THINGS AT HOME, OR GET ALONG WITH OTHER PEOPLE: 0
3. TROUBLE FALLING OR STAYING ASLEEP: 0
6. FEELING BAD ABOUT YOURSELF - OR THAT YOU ARE A FAILURE OR HAVE LET YOURSELF OR YOUR FAMILY DOWN: 0
SUM OF ALL RESPONSES TO PHQ QUESTIONS 1-9: 0
2. FEELING DOWN, DEPRESSED OR HOPELESS: 0
8. MOVING OR SPEAKING SO SLOWLY THAT OTHER PEOPLE COULD HAVE NOTICED. OR THE OPPOSITE, BEING SO FIGETY OR RESTLESS THAT YOU HAVE BEEN MOVING AROUND A LOT MORE THAN USUAL: 0
1. LITTLE INTEREST OR PLEASURE IN DOING THINGS: 0
SUM OF ALL RESPONSES TO PHQ9 QUESTIONS 1 & 2: 0
SUM OF ALL RESPONSES TO PHQ QUESTIONS 1-9: 0
5. POOR APPETITE OR OVEREATING: 0
7. TROUBLE CONCENTRATING ON THINGS, SUCH AS READING THE NEWSPAPER OR WATCHING TELEVISION: 0
SUM OF ALL RESPONSES TO PHQ QUESTIONS 1-9: 0

## 2022-03-29 ASSESSMENT — ENCOUNTER SYMPTOMS
DIARRHEA: 0
SINUS PAIN: 0
SHORTNESS OF BREATH: 0
ABDOMINAL PAIN: 0
SINUS PRESSURE: 0
BACK PAIN: 1
RHINORRHEA: 0
VOMITING: 0
NAUSEA: 0
COUGH: 0
CONSTIPATION: 0

## 2022-03-29 NOTE — PATIENT INSTRUCTIONS
Look into a therapist/counselor    Physical Therapy referral has been placed  [address] [facility]  [place], [place], [address]   Call to schedule: [phone]    Patient Education        Low Back Pain: Exercises  Introduction  Here are some examples of exercises for you to try. The exercises may be suggested for a condition or for rehabilitation. Start each exercise slowly. Ease off the exercises if you start to have pain. You will be told when to start these exercises and which ones will work bestfor you. How to do the exercises  Press-up    1. Lie on your stomach, supporting your body with your forearms. 2. Press your elbows down into the floor to raise your upper back. As you do this, relax your stomach muscles and allow your back to arch without using your back muscles. As your press up, do not let your hips or pelvis come off the floor. 3. Hold for 15 to 30 seconds, then relax. 4. Repeat 2 to 4 times. Alternate arm and leg (bird dog) exercise    Do this exercise slowly. Try to keep your body straight at all times, and donot let one hip drop lower than the other. 1. Start on the floor, on your hands and knees. 2. Tighten your belly muscles. 3. Raise one leg off the floor, and hold it straight out behind you. Be careful not to let your hip drop down, because that will twist your trunk. 4. Hold for about 6 seconds, then lower your leg and switch to the other leg. 5. Repeat 8 to 12 times on each leg. 6. Over time, work up to holding for 10 to 30 seconds each time. 7. If you feel stable and secure with your leg raised, try raising the opposite arm straight out in front of you at the same time. Knee-to-chest exercise    1. Lie on your back with your knees bent and your feet flat on the floor.   2. Bring one knee to your chest, keeping the other foot flat on the floor (or keeping the other leg straight, whichever feels better on your lower back). 3. Keep your lower back pressed to the floor. Hold for at least 15 to 30 seconds. 4. Relax, and lower the knee to the starting position. 5. Repeat with the other leg. Repeat 2 to 4 times with each leg. 6. To get more stretch, put your other leg flat on the floor while pulling your knee to your chest.  Curl-ups    1. Lie on the floor on your back with your knees bent at a 90-degree angle. Your feet should be flat on the floor, about 12 inches from your buttocks. 2. Cross your arms over your chest. If this bothers your neck, try putting your hands behind your neck (not your head), with your elbows spread apart. 3. Slowly tighten your belly muscles and raise your shoulder blades off the floor. 4. Keep your head in line with your body, and do not press your chin to your chest.  5. Hold this position for 1 or 2 seconds, then slowly lower yourself back down to the floor. 6. Repeat 8 to 12 times. Pelvic tilt exercise    1. Lie on your back with your knees bent. 2. \"Brace\" your stomach. This means to tighten your muscles by pulling in and imagining your belly button moving toward your spine. You should feel like your back is pressing to the floor and your hips and pelvis are rocking back. 3. Hold for about 6 seconds while you breathe smoothly. 4. Repeat 8 to 12 times. Heel dig bridging    1. Lie on your back with both knees bent and your ankles bent so that only your heels are digging into the floor. Your knees should be bent about 90 degrees. 2. Then push your heels into the floor, squeeze your buttocks, and lift your hips off the floor until your shoulders, hips, and knees are all in a straight line. 3. Hold for about 6 seconds as you continue to breathe normally, and then slowly lower your hips back down to the floor and rest for up to 10 seconds. 4. Do 8 to 12 repetitions. Hamstring stretch in doorway    1.  Lie on your back in a doorway, with one leg through the open door.  2. Slide your leg up the wall to straighten your knee. You should feel a gentle stretch down the back of your leg. 3. Hold the stretch for at least 15 to 30 seconds. Do not arch your back, point your toes, or bend either knee. Keep one heel touching the floor and the other heel touching the wall. 4. Repeat with your other leg. 5. Do 2 to 4 times for each leg. Hip flexor stretch    1. Kneel on the floor with one knee bent and one leg behind you. Place your forward knee over your foot. Keep your other knee touching the floor. 2. Slowly push your hips forward until you feel a stretch in the upper thigh of your rear leg. 3. Hold the stretch for at least 15 to 30 seconds. Repeat with your other leg. 4. Do 2 to 4 times on each side. Wall sit    1. Stand with your back 10 to 12 inches away from a wall. 2. Lean into the wall until your back is flat against it. 3. Slowly slide down until your knees are slightly bent, pressing your lower back into the wall. 4. Hold for about 6 seconds, then slide back up the wall. 5. Repeat 8 to 12 times. Follow-up care is a key part of your treatment and safety. Be sure to make and go to all appointments, and call your doctor if you are having problems. It's also a good idea to know your test results and keep alist of the medicines you take. Where can you learn more? Go to https://[email or web address]. org and sign in to your [employer] account. Enter V831 in the [employer] box to learn more about \"Low Back Pain: Exercises. \"     If you do not have an account, please click on the \"Sign Up Now\" link. Current as of: July 1, 2021               Content Version: 13.2  © 2006-2022 Healthwise[employer] Inc[employer]. Care instructions adapted under license by [facility] ([facility]).  If you have questions about a medical condition or this instruction, always ask your healthcare professional. [place] [address] any warranty or liability for your use of this information.

## 2022-03-30 ENCOUNTER — TELEPHONE (OUTPATIENT)
Dept: PULMONOLOGY | Age: 32
End: 2022-03-30

## 2022-03-30 NOTE — TELEPHONE ENCOUNTER
Nuvigil (pt has generic)  [address]  Pt is due to have refills and Luis Enrique Johnson says they haven't gotten auth from [facility]. Please assist. Pt is out of it.

## 2022-04-25 DIAGNOSIS — F39 MOOD DISORDER (HCC): ICD-10-CM

## 2022-04-25 DIAGNOSIS — F41.9 ANXIETY: ICD-10-CM

## 2022-04-25 RX ORDER — QUETIAPINE FUMARATE 25 MG/1
TABLET, FILM COATED ORAL
Qty: 30 TABLET | Refills: 5 | Status: SHIPPED | OUTPATIENT
Start: 2022-04-25

## 2022-05-16 DIAGNOSIS — F39 MOOD DISORDER (HCC): ICD-10-CM

## 2022-05-16 DIAGNOSIS — F41.9 ANXIETY: ICD-10-CM

## 2022-05-17 RX ORDER — FLUOXETINE HYDROCHLORIDE 20 MG/1
60 CAPSULE ORAL DAILY
Qty: 90 CAPSULE | Refills: 2 | Status: SHIPPED | OUTPATIENT
Start: 2022-05-17 | End: 2022-08-29

## 2022-05-17 RX ORDER — LAMOTRIGINE 25 MG/1
50 TABLET ORAL DAILY
Qty: 60 TABLET | Refills: 2 | Status: SHIPPED | OUTPATIENT
Start: 2022-05-17 | End: 2022-08-29

## 2022-08-27 DIAGNOSIS — F41.9 ANXIETY: ICD-10-CM

## 2022-08-27 DIAGNOSIS — F39 MOOD DISORDER (HCC): ICD-10-CM

## 2022-08-29 RX ORDER — FLUOXETINE HYDROCHLORIDE 20 MG/1
CAPSULE ORAL
Qty: 90 CAPSULE | Refills: 0 | Status: SHIPPED | OUTPATIENT
Start: 2022-08-29 | End: 2022-11-21 | Stop reason: SDUPTHER

## 2022-08-29 RX ORDER — LAMOTRIGINE 25 MG/1
TABLET ORAL
Qty: 60 TABLET | Refills: 0 | Status: SHIPPED | OUTPATIENT
Start: 2022-08-29 | End: 2022-11-21 | Stop reason: SDUPTHER

## 2022-08-29 NOTE — TELEPHONE ENCOUNTER
Please schedule annual complete physical (30 minutes) in 1 month with Dr. Ajit Leavitt or her NP, Pb Saab.

## 2022-09-29 DIAGNOSIS — G47.11 IDIOPATHIC HYPERSOMNIA: ICD-10-CM

## 2022-09-29 RX ORDER — ARMODAFINIL 150 MG/1
TABLET ORAL
Qty: 30 TABLET | Refills: 5 | OUTPATIENT
Start: 2022-09-29 | End: 2026-09-29

## 2022-10-04 ENCOUNTER — TELEPHONE (OUTPATIENT)
Dept: PULMONOLOGY | Age: 32
End: 2022-10-04

## 2022-10-04 ENCOUNTER — OFFICE VISIT (OUTPATIENT)
Dept: FAMILY MEDICINE CLINIC | Age: 32
End: 2022-10-04
Payer: COMMERCIAL

## 2022-10-04 VITALS
TEMPERATURE: 98.6 F | SYSTOLIC BLOOD PRESSURE: 120 MMHG | OXYGEN SATURATION: 97 % | BODY MASS INDEX: 32.97 KG/M2 | DIASTOLIC BLOOD PRESSURE: 82 MMHG | HEIGHT: 61 IN | WEIGHT: 174.6 LBS | HEART RATE: 85 BPM | RESPIRATION RATE: 14 BRPM

## 2022-10-04 DIAGNOSIS — G47.11 IDIOPATHIC HYPERSOMNIA: ICD-10-CM

## 2022-10-04 DIAGNOSIS — F39 MOOD DISORDER (HCC): Primary | ICD-10-CM

## 2022-10-04 DIAGNOSIS — Z23 NEED FOR PNEUMOCOCCAL VACCINATION: ICD-10-CM

## 2022-10-04 DIAGNOSIS — Z28.21 COVID-19 VACCINATION DECLINED: ICD-10-CM

## 2022-10-04 DIAGNOSIS — F41.9 ANXIETY: ICD-10-CM

## 2022-10-04 PROCEDURE — 90677 PCV20 VACCINE IM: CPT | Performed by: FAMILY MEDICINE

## 2022-10-04 PROCEDURE — 90471 IMMUNIZATION ADMIN: CPT | Performed by: FAMILY MEDICINE

## 2022-10-04 PROCEDURE — 99213 OFFICE O/P EST LOW 20 MIN: CPT | Performed by: FAMILY MEDICINE

## 2022-10-04 RX ORDER — ARMODAFINIL 150 MG/1
TABLET ORAL
Qty: 30 TABLET | Refills: 5 | Status: SHIPPED | OUTPATIENT
Start: 2022-10-04 | End: 2026-10-04

## 2022-10-04 SDOH — ECONOMIC STABILITY: FOOD INSECURITY: WITHIN THE PAST 12 MONTHS, THE FOOD YOU BOUGHT JUST DIDN'T LAST AND YOU DIDN'T HAVE MONEY TO GET MORE.: NEVER TRUE

## 2022-10-04 SDOH — ECONOMIC STABILITY: FOOD INSECURITY: WITHIN THE PAST 12 MONTHS, YOU WORRIED THAT YOUR FOOD WOULD RUN OUT BEFORE YOU GOT MONEY TO BUY MORE.: NEVER TRUE

## 2022-10-04 ASSESSMENT — SOCIAL DETERMINANTS OF HEALTH (SDOH): HOW HARD IS IT FOR YOU TO PAY FOR THE VERY BASICS LIKE FOOD, HOUSING, MEDICAL CARE, AND HEATING?: NOT HARD AT ALL

## 2022-10-04 NOTE — TELEPHONE ENCOUNTER
I am assuming medication request is for Nuvigil. Last appt:8/23/2021  Next appt:10/18/2022  Medication matches medication on Epic list      Will FWD to see if ok for refill since patient has not been seen in over a year.

## 2022-10-04 NOTE — PROGRESS NOTES
Mood Visit  Subjective:     Chief Complaint   Patient presents with    Depression     Doing well on meds. Lizett Espinoza is a 33 y.o. female who presents for follow up of mood issue. Got L foot Fx prox 5th phalanx. Now in boot. Hopefully no surgery    HISTORY  Are you working with a psychologist / psychiatrist?  No  Have you felt your symptoms are better, worse, or unchanged since your last visit   better  Mood is good. Sleep is good. Patient denies depression symptoms of: suicidal intention  Patient denies anxiety symptoms of: losing control. CHART REVIEW  Health Maintenance   Topic Date Due    Pneumococcal 0-64 years Vaccine (1 - PCV) Never done    Flu vaccine (1) 10/04/2023 (Originally 8/1/2022)    COVID-19 Vaccine (1) 10/11/2023 (Originally 1990)    Depression Monitoring  03/29/2023    Cervical cancer screen  07/23/2023    DTaP/Tdap/Td vaccine (2 - Td or Tdap) 03/13/2030    Hepatitis C screen  Completed    HIV screen  Completed    Hepatitis A vaccine  Aged Out    Hepatitis B vaccine  Aged Out    Hib vaccine  Aged Out    Meningococcal (ACWY) vaccine  Aged Out    Varicella vaccine  Discontinued     Prior to Visit Medications    Medication Sig Taking?  Authorizing Provider   lamoTRIgine (LAMICTAL) 25 MG tablet TAKE TWO TABLETS BY MOUTH DAILY Yes Stephenie Ball MD   FLUoxetine (PROZAC) 20 MG capsule TAKE THREE CAPSULES BY MOUTH DAILY Yes Stephenie Ball MD   QUEtiapine (SEROQUEL) 25 MG tablet TAKE ONE TABLET BY MOUTH ONCE NIGHTLY Yes Stephenie Ball MD   Armodafinil (NUVIGIL) 150 MG TABS tablet TAKE ONE TABLET BY MOUTH EVERY MORNING AS NEEDED Yes Sandra Whitmore MD   propranolol (INDERAL) 10 MG tablet Take 1 tablet by mouth 3 times daily as needed (anxiety) Yes SAURAV Montaño - CNP   Biotin w/ Vitamins C & E (HAIR/SKIN/NAILS PO) Take by mouth Yes Historical Provider, MD   Fexofenadine-Pseudoephedrine (ALLEGRA-D 24 HOUR PO) Take 1 tablet by mouth once as needed Yes Historical Provider, MD Social History     Tobacco Use    Smoking status: Former     Packs/day: 0.25     Types: Cigarettes    Smokeless tobacco: Never    Tobacco comments:     Quit but vapes   Vaping Use    Vaping Use: Never used   Substance Use Topics    Alcohol use:  Yes     Alcohol/week: 1.0 standard drink     Types: 1 Glasses of wine per week     Comment: occass last drink over a month    Drug use: Yes     Types: Marijuana (Weed)     Comment: couple weeks ago      LAST LABS  Cholesterol, Total   Date Value Ref Range Status   03/13/2020 144 0 - 199 mg/dL Final     LDL Calculated   Date Value Ref Range Status   03/13/2020 91 <100 mg/dL Final     HDL   Date Value Ref Range Status   03/13/2020 40 40 - 60 mg/dL Final     Triglycerides   Date Value Ref Range Status   03/13/2020 63 0 - 150 mg/dL Final     Lab Results   Component Value Date    GLUCOSE 80 10/18/2021     Lab Results   Component Value Date     10/18/2021    K 3.9 10/18/2021    CREATININE 0.7 10/18/2021     Lab Results   Component Value Date    WBC 9.6 10/18/2021    HGB 12.7 10/18/2021    HCT 38.8 10/18/2021    MCV 78.8 (L) 10/18/2021     10/18/2021     Lab Results   Component Value Date    ALT 20 10/18/2021    AST 16 10/18/2021    ALKPHOS 96 10/18/2021    BILITOT 0.3 10/18/2021     TSH (uIU/mL)   Date Value   03/13/2020 1.28     No results found for: LABA1C  Objective:   PHYSICAL EXAM  /82 (Site: Left Upper Arm, Position: Sitting, Cuff Size: Medium Adult)   Pulse 85   Temp 98.6 °F (37 °C) (Core)   Resp 14   Ht 5' 1\" (1.549 m)   Wt 174 lb 9.6 oz (79.2 kg)   LMP 09/09/2022 (Approximate)   SpO2 97%   BMI 32.99 kg/m²   BP Readings from Last 5 Encounters:   10/04/22 120/82   03/29/22 126/82   11/23/21 (!) 142/85   10/31/21 107/64   08/23/21 104/82     Wt Readings from Last 5 Encounters:   10/04/22 174 lb 9.6 oz (79.2 kg)   03/29/22 171 lb 3.2 oz (77.7 kg)   11/23/21 177 lb 11.1 oz (80.6 kg)   10/31/21 167 lb 1 oz (75.8 kg)   08/23/21 169 lb (76.7 kg) GENERAL: well-developed, well-nourished, alert, no distress, calm  PSYCH:  full facial expressions, good grooming, good insight, normal perception, normal reasoning, and normal speech pattern and content and normal thought patterns    The time spent counseling patient was 10 minutes of 15 minute appointment. Assessment and Plan:      Diagnosis Orders   1. Mood disorder [facility])  Ambulatory referral to Psychology    Ambulatory referral to Psychiatry      2. Anxiety  Ambulatory referral to Psychology    Ambulatory referral to Psychiatry      3. COVID-19 vaccination declined        4. Need for pneumococcal vaccination  Pneumococcal, PCV20, PREVNAR 21, (age 25 yrs+), IM, PF      improved. Plan as above and below. COUNSELLING  Discussed use, benefit, and side effects of prescribed medications. Barriers to medication compliance addressed. All patient questions answered. Pt voiced understanding. INSTRUCTIONS  NEXT APPOINTMENT: Please schedule annual complete physical (30 minutes) in 6 months FASTING with Dr. Meng Tran or her NP, Emre Monaco. Please get flu vaccine when available in fall. Can get either at this office or at stores such as [employer] and [employer]. Look into counseling and psychiatry.

## 2022-10-04 NOTE — PATIENT INSTRUCTIONS
INSTRUCTIONS  NEXT APPOINTMENT: Please schedule annual complete physical (30 minutes) in 6 months FASTING with Dr. Janki Luu or her NP, Maria Elena Mera. Please get flu vaccine when available in fall. Can get either at this office or at stores such as [employer]. Look into counseling and psychiatry.

## 2022-10-04 NOTE — TELEPHONE ENCOUNTER
Patient is requesting a refill , at least enough to hold her over until next appointment     Next office Visit 10/18

## 2022-10-18 ENCOUNTER — OFFICE VISIT (OUTPATIENT)
Dept: SLEEP MEDICINE | Age: 32
End: 2022-10-18
Payer: COMMERCIAL

## 2022-10-18 VITALS
BODY MASS INDEX: 32.77 KG/M2 | HEIGHT: 61 IN | TEMPERATURE: 97.5 F | SYSTOLIC BLOOD PRESSURE: 120 MMHG | HEART RATE: 85 BPM | RESPIRATION RATE: 21 BRPM | OXYGEN SATURATION: 98 % | WEIGHT: 173.6 LBS | DIASTOLIC BLOOD PRESSURE: 80 MMHG

## 2022-10-18 DIAGNOSIS — R53.83 FATIGUE, UNSPECIFIED TYPE: ICD-10-CM

## 2022-10-18 DIAGNOSIS — G47.11 IDIOPATHIC HYPERSOMNIA: Primary | ICD-10-CM

## 2022-10-18 PROCEDURE — 99214 OFFICE O/P EST MOD 30 MIN: CPT | Performed by: PSYCHIATRY & NEUROLOGY

## 2022-10-18 RX ORDER — MODAFINIL 200 MG/1
TABLET ORAL
Qty: 30 TABLET | Refills: 5 | Status: SHIPPED | OUTPATIENT
Start: 2022-10-18 | End: 2022-10-24 | Stop reason: SDUPTHER

## 2022-10-18 ASSESSMENT — SLEEP AND FATIGUE QUESTIONNAIRES
HOW LIKELY ARE YOU TO NOD OFF OR FALL ASLEEP WHILE SITTING AND READING: 3
HOW LIKELY ARE YOU TO NOD OFF OR FALL ASLEEP WHEN YOU ARE A PASSENGER IN A CAR FOR AN HOUR WITHOUT A BREAK: 3
HOW LIKELY ARE YOU TO NOD OFF OR FALL ASLEEP WHILE WATCHING TV: 3
HOW LIKELY ARE YOU TO NOD OFF OR FALL ASLEEP WHILE SITTING QUIETLY AFTER LUNCH WITHOUT ALCOHOL: 2
HOW LIKELY ARE YOU TO NOD OFF OR FALL ASLEEP IN A CAR, WHILE STOPPED FOR A FEW MINUTES IN TRAFFIC: 2
HOW LIKELY ARE YOU TO NOD OFF OR FALL ASLEEP WHILE LYING DOWN TO REST IN THE AFTERNOON WHEN CIRCUMSTANCES PERMIT: 3
ESS TOTAL SCORE: 20
HOW LIKELY ARE YOU TO NOD OFF OR FALL ASLEEP WHILE SITTING AND TALKING TO SOMEONE: 2
HOW LIKELY ARE YOU TO NOD OFF OR FALL ASLEEP WHILE SITTING INACTIVE IN A PUBLIC PLACE: 2

## 2022-10-18 NOTE — PROGRESS NOTES
MD TRISHA Vallejo Board Certified in Sleep Medicine  Certified in [address] [place] [address] Certified in Neurology [address]  [address]  [facility] [address]  [phone]   [address] [place] [address] [facility]                      [address]  [address] [address] [phone]    Subjective:     Patient ID: Lizett Espinoza is a 33 y.o. female. Chief Complaint   Patient presents with    Follow-up     Medication          HPI:        Lizett Espinoza is a 33 y.o. female was seen today as a follow for daytime sleepiness The patient underwent comprehensive polysomnogram on 09/19/2021, the overnight registration revealed no significant sleep disordered breathing with apnea hypopnea index of 2.9 with lowest O2 saturation of 89%, patient spent about 0 minutes below 90% (weight was 169 pounds), total sleep time was 419.5 minute with SE 84.2%, sleep onset latency was 69.5 minutes, and REM onset latency was 137.5 minutes,  Subsequently, the patient underwent MSLT , mean sleep latency was 2.4 minutes with one REM episode.  ( Was on Prozac while on the study)  Was taking Nuvigil 150 mg and was helping, did not need to take a nap, but had to stop because of the insurance coverage     The Patient scored Medway Sleepiness Score: 20 on Medway Sleepiness Scale ( more than 10 is indicative of daytime sleepiness)       DOT/CDL - N/A        Previous Report(s)Reviewed: historical medical records         Social History     Socioeconomic History    Marital status:      Spouse name: Not on file    Number of children: Not on file    Years of education: Not on file    Highest education level: Not on file   Occupational History    Not on file   Tobacco Use    Smoking status: Former     Packs/day: 0.25     Types: Cigarettes Smokeless tobacco: Never    Tobacco comments:     Quit but vapes   Vaping Use    Vaping Use: Never used   Substance and Sexual Activity    Alcohol use: Yes     Alcohol/week: 1.0 standard drink     Types: 1 Glasses of wine per week     Comment: occass last drink over a month    Drug use: Yes     Types: Marijuana (Weed)     Comment: couple weeks ago    Sexual activity: Yes     Partners: Male   Other Topics Concern    Not on file   Social History Narrative    Not on file     Social Determinants of Health     Financial Resource Strain: Low Risk     Difficulty of Paying Living Expenses: Not hard at all   Food Insecurity: No Food Insecurity    Worried About Running Out of Food in the Last Year: Never true    Ran Out of Food in the Last Year: Never true   Transportation Needs: Not on file   Physical Activity: Not on file   Stress: Not on file   Social Connections: Not on file   Intimate Partner Violence: Not on file   Housing Stability: Not on file       Prior to Admission medications    Medication Sig Start Date End Date Taking?  Authorizing Provider   modafinil (PROVIGIL) 200 MG tablet One tab QAM 10/18/22 10/15/26 Yes Bety Burger MD   Armodafinil (NUVIGIL) 150 MG TABS tablet TAKE ONE TABLET BY MOUTH EVERY MORNING AS NEEDED 10/4/22 10/4/26 Yes Bety Burger MD   lamoTRIgine (LAMICTAL) 25 MG tablet TAKE TWO TABLETS BY MOUTH DAILY 8/29/22  Yes Raza Quach MD   FLUoxetine (PROZAC) 20 MG capsule TAKE THREE CAPSULES BY MOUTH DAILY 8/29/22  Yes Raza Quach MD   QUEtiapine (SEROQUEL) 25 MG tablet TAKE ONE TABLET BY MOUTH ONCE NIGHTLY 4/25/22  Yes Raza Quach MD   propranolol (INDERAL) 10 MG tablet Take 1 tablet by mouth 3 times daily as needed (anxiety) 3/29/22  Yes Helen Peppers, APRN - CNP   Biotin w/ Vitamins C & E (HAIR/SKIN/NAILS PO) Take by mouth   Yes Historical Provider, MD   Fexofenadine-Pseudoephedrine (ALLEGRA-D 24 HOUR PO) Take 1 tablet by mouth once as needed   Yes Historical Provider, MD Allergies as of 10/18/2022 - Fully Reviewed 10/18/2022   Allergen Reaction Noted    Depo-provera [medroxyprogesterone acetate]  08/04/2011    Promethazine Nausea Only 01/24/2014       Patient Active Problem List   Diagnosis    Allergic rhinitis, seasonal    Migraine    Recurrent major depressive disorder, in full remission ([address]    Dysfunctional uterine bleeding    Other congenital anomaly of lower limb, including pelvic girdle    Congenital anomaly of upper limb    Anxiety    Chronic bilateral low back pain without sciatica    ERVIN (obstructive sleep apnea)    Hypersomnolence    Idiopathic hypersomnia    Major depression, recurrent ([address]    Mood disorder ([address]    Tobacco dependence    Obesity (BMI 30.0-34. 9)    COVID-19 vaccination declined       Past Medical History:   Diagnosis Date    Abnormal Pap smear of cervix     will biopsy post partum    Anxiety     Chickenpox     Depression     HX OTHER MEDICAL     mild leg length and hand size discrepancy    Hypersomnia     IBS (irritable bowel syndrome)     Lactose intolerance     Mental disorder     Depression; not medicated currently    Ovarian cyst        Past Surgical History:   Procedure Laterality Date    TUBAL LIGATION         Family History   Problem Relation Age of Onset    Arthritis Mother     Depression Mother     Arthritis Father     Depression Father     Hearing Loss Sister     Learning Disabilities Brother     Mental Retardation Brother     Cancer Maternal Grandmother     Cancer Paternal Grandfather     Kidney Disease Paternal Grandfather        Review of Systems   Constitutional:  Positive for fatigue. Objective:     Vitals:  Weight BMI Neck circumference    Wt Readings from Last 3 Encounters:   10/18/22 173 lb 9.6 oz (78.7 kg)   10/04/22 174 lb 9.6 oz (79.2 kg)   03/29/22 171 lb 3.2 oz (77.7 kg)    Body mass index is 32.8 kg/m².        BP HR SaO2   BP Readings from Last 3 Encounters:   10/18/22 120/80   10/04/22 120/82   03/29/22 126/82    Pulse Readings from Last 3 Encounters:   10/18/22 85   10/04/22 85   03/29/22 112    SpO2 Readings from Last 3 Encounters:   10/18/22 98%   10/04/22 97%   03/29/22 98%        Themandibular molar Class :   [x]1 []2 []3      Mallampati I Airway Classification:   []1 []2 []3 [x]4      Physical Exam  Vitals and nursing note reviewed. Cardiovascular:      Rate and Rhythm: Normal rate and regular rhythm. Pulmonary:      Effort: Pulmonary effort is normal.      Breath sounds: Normal breath sounds. Musculoskeletal:      Right lower leg: No edema. Left lower leg: No edema.       :        Diagnosis Orders   1. Idiopathic hypersomnia  modafinil (PROVIGIL) 200 MG tablet      2. Fatigue, unspecified type          Plan:   I will try the Provigil instead 200 mg , then increase to 400 mg as needed. Drug contract for Provigil. Be carefull with driving. [place]AR[place] report reviewed and is consistent with the plan of care. The patient understands the risks of dependency and addiction with above medication. Plan to take lowest therapeutic dose possible and discontinue as soon as possible. Yearly OARRS report, controlled substance agreement and urine tox screen. No orders of the defined types were placed in this encounter. Return in about 6 months (around 4/18/2023) for EDS. Eloise Ortiz MD  Medical Director - [facility]

## 2022-10-24 ENCOUNTER — HOSPITAL ENCOUNTER (EMERGENCY)
Age: 32
Discharge: LEFT BEFORE DISCHARGE | End: 2022-10-25
Attending: EMERGENCY MEDICINE

## 2022-10-24 ENCOUNTER — TELEPHONE (OUTPATIENT)
Dept: PULMONOLOGY | Age: 32
End: 2022-10-24

## 2022-10-24 VITALS
BODY MASS INDEX: 33.22 KG/M2 | OXYGEN SATURATION: 99 % | TEMPERATURE: 97.8 F | SYSTOLIC BLOOD PRESSURE: 145 MMHG | DIASTOLIC BLOOD PRESSURE: 97 MMHG | HEART RATE: 74 BPM | HEIGHT: 61 IN | RESPIRATION RATE: 16 BRPM | WEIGHT: 175.93 LBS

## 2022-10-24 DIAGNOSIS — G47.11 IDIOPATHIC HYPERSOMNIA: ICD-10-CM

## 2022-10-24 RX ORDER — MODAFINIL 200 MG/1
TABLET ORAL
Qty: 30 TABLET | Refills: 5 | Status: SHIPPED | OUTPATIENT
Start: 2022-10-24 | End: 2026-10-21

## 2022-10-24 RX ORDER — MODAFINIL 200 MG/1
TABLET ORAL
Qty: 30 TABLET | Refills: 5 | Status: CANCELLED | OUTPATIENT
Start: 2022-10-24 | End: 2026-10-21

## 2022-10-24 ASSESSMENT — PAIN DESCRIPTION - LOCATION: LOCATION: JAW

## 2022-10-24 ASSESSMENT — PAIN SCALES - GENERAL: PAINLEVEL_OUTOF10: 10

## 2022-10-24 ASSESSMENT — PAIN DESCRIPTION - ORIENTATION: ORIENTATION: LEFT

## 2022-10-24 ASSESSMENT — PAIN DESCRIPTION - PAIN TYPE: TYPE: ACUTE PAIN

## 2022-10-24 ASSESSMENT — PAIN DESCRIPTION - ONSET: ONSET: GRADUAL

## 2022-10-24 ASSESSMENT — PAIN DESCRIPTION - FREQUENCY: FREQUENCY: CONTINUOUS

## 2022-10-24 ASSESSMENT — PAIN - FUNCTIONAL ASSESSMENT: PAIN_FUNCTIONAL_ASSESSMENT: 0-10

## 2022-10-24 NOTE — TELEPHONE ENCOUNTER
Catie calls. Requesting script of Provigil be transferred to [facility],  it will be cheaper and close to her work. Current script at [facility] has not been picked up.   New script pending with [address] attached

## 2022-11-21 DIAGNOSIS — F39 MOOD DISORDER (HCC): ICD-10-CM

## 2022-11-21 DIAGNOSIS — F41.9 ANXIETY: ICD-10-CM

## 2022-11-21 RX ORDER — LAMOTRIGINE 25 MG/1
50 TABLET ORAL DAILY
Qty: 60 TABLET | Refills: 4 | Status: SHIPPED | OUTPATIENT
Start: 2022-11-21

## 2022-11-21 RX ORDER — FLUOXETINE HYDROCHLORIDE 20 MG/1
60 CAPSULE ORAL DAILY
Qty: 90 CAPSULE | Refills: 4 | Status: SHIPPED | OUTPATIENT
Start: 2022-11-21

## 2022-11-21 RX ORDER — QUETIAPINE FUMARATE 25 MG/1
TABLET, FILM COATED ORAL
Qty: 30 TABLET | Refills: 5 | Status: SHIPPED | OUTPATIENT
Start: 2022-11-21

## 2023-01-19 DIAGNOSIS — G47.11 IDIOPATHIC HYPERSOMNIA: ICD-10-CM

## 2023-01-23 DIAGNOSIS — G47.11 IDIOPATHIC HYPERSOMNIA: ICD-10-CM

## 2023-01-23 RX ORDER — MODAFINIL 200 MG/1
TABLET ORAL
Qty: 30 TABLET | Refills: 5 | Status: SHIPPED | OUTPATIENT
Start: 2023-01-23 | End: 2027-01-16

## 2023-01-23 RX ORDER — MODAFINIL 200 MG/1
TABLET ORAL
Qty: 30 TABLET | Refills: 5 | Status: SHIPPED | OUTPATIENT
Start: 2023-01-23 | End: 2023-01-23 | Stop reason: SDUPTHER

## 2023-05-08 ENCOUNTER — APPOINTMENT (OUTPATIENT)
Dept: GENERAL RADIOLOGY | Age: 33
End: 2023-05-08

## 2023-05-08 ENCOUNTER — APPOINTMENT (OUTPATIENT)
Dept: CT IMAGING | Age: 33
End: 2023-05-08

## 2023-05-08 ENCOUNTER — HOSPITAL ENCOUNTER (EMERGENCY)
Age: 33
Discharge: HOME | End: 2023-05-09
Attending: EMERGENCY MEDICINE

## 2023-05-08 DIAGNOSIS — I10 PRIMARY HYPERTENSION: ICD-10-CM

## 2023-05-08 DIAGNOSIS — R07.9 CHEST PAIN, UNSPECIFIED TYPE: Primary | ICD-10-CM

## 2023-05-08 DIAGNOSIS — R20.2 PARESTHESIA: ICD-10-CM

## 2023-05-08 LAB
ALBUMIN SERPL-MCNC: 4.2 G/DL (ref 3.4–5)
ALBUMIN/GLOB SERPL: 1.3 {RATIO} (ref 1.1–2.2)
ALP SERPL-CCNC: 103 U/L (ref 40–129)
ALT SERPL-CCNC: 26 U/L (ref 10–40)
ANION GAP SERPL CALCULATED.3IONS-SCNC: 13 MMOL/L (ref 3–16)
AST SERPL-CCNC: 37 U/L (ref 15–37)
BASOPHILS # BLD: 0 K/UL (ref 0–0.2)
BASOPHILS NFR BLD: 0.4 %
BILIRUB SERPL-MCNC: <0.2 MG/DL (ref 0–1)
BUN SERPL-MCNC: 6 MG/DL (ref 7–20)
CALCIUM SERPL-MCNC: 9.1 MG/DL (ref 8.3–10.6)
CHLORIDE SERPL-SCNC: 102 MMOL/L (ref 99–110)
CHP ED QC CHECK: YES
CO2 SERPL-SCNC: 20 MMOL/L (ref 21–32)
CREAT SERPL-MCNC: 0.7 MG/DL (ref 0.6–1.1)
DEPRECATED RDW RBC AUTO: 14.3 % (ref 12.4–15.4)
EOSINOPHIL # BLD: 0.2 K/UL (ref 0–0.6)
EOSINOPHIL NFR BLD: 2.2 %
GFR SERPLBLD CREATININE-BSD FMLA CKD-EPI: >60 ML/MIN/{1.73_M2}
GLUCOSE BLD-MCNC: 106 MG/DL
GLUCOSE SERPL-MCNC: 104 MG/DL (ref 70–99)
HCT VFR BLD AUTO: 38.9 % (ref 36–48)
HGB BLD-MCNC: 12.5 G/DL (ref 12–16)
IMM GRANULOCYTES # BLD: 0 K/UL (ref 0–0.2)
IMM GRANULOCYTES NFR BLD: 0.2 %
LYMPHOCYTES # BLD: 3.5 K/UL (ref 1–5.1)
LYMPHOCYTES NFR BLD: 31.8 %
MCH RBC QN AUTO: 25.4 PG (ref 26–34)
MCHC RBC AUTO-ENTMCNC: 32.1 G/DL (ref 32–36.4)
MCV RBC AUTO: 78.9 FL (ref 80–100)
MONOCYTES # BLD: 0.8 K/UL (ref 0–1.3)
MONOCYTES NFR BLD: 7 %
NEUTROPHILS # BLD: 6.4 K/UL (ref 1.7–7.7)
NEUTROPHILS NFR BLD: 58.4 %
PLATELET # BLD AUTO: 481 K/UL (ref 135–450)
PMV BLD AUTO: 10 FL (ref 5–10.5)
POTASSIUM SERPL-SCNC: 3.9 MMOL/L (ref 3.5–5.1)
PROT SERPL-MCNC: 7.4 G/DL (ref 6.4–8.2)
RBC # BLD AUTO: 4.93 M/UL (ref 4–5.2)
SODIUM SERPL-SCNC: 135 MMOL/L (ref 136–145)
TROPONIN, HIGH SENSITIVITY: <6 NG/L (ref 0–14)
WBC # BLD AUTO: 10.9 K/UL (ref 4–11)

## 2023-05-08 PROCEDURE — 71046 X-RAY EXAM CHEST 2 VIEWS: CPT

## 2023-05-08 PROCEDURE — 70450 CT HEAD/BRAIN W/O DYE: CPT

## 2023-05-08 PROCEDURE — 84484 ASSAY OF TROPONIN QUANT: CPT

## 2023-05-08 PROCEDURE — 99285 EMERGENCY DEPT VISIT HI MDM: CPT

## 2023-05-08 PROCEDURE — 93005 ELECTROCARDIOGRAM TRACING: CPT | Performed by: EMERGENCY MEDICINE

## 2023-05-08 PROCEDURE — 96374 THER/PROPH/DIAG INJ IV PUSH: CPT

## 2023-05-08 PROCEDURE — 85025 COMPLETE CBC W/AUTO DIFF WBC: CPT

## 2023-05-08 PROCEDURE — 80053 COMPREHEN METABOLIC PANEL: CPT

## 2023-05-08 PROCEDURE — 36415 COLL VENOUS BLD VENIPUNCTURE: CPT

## 2023-05-08 PROCEDURE — 6360000002 HC RX W HCPCS: Performed by: EMERGENCY MEDICINE

## 2023-05-08 RX ORDER — QUETIAPINE FUMARATE 25 MG/1
25 TABLET, FILM COATED ORAL 2 TIMES DAILY
COMMUNITY

## 2023-05-08 RX ORDER — LAMOTRIGINE 100 MG/1
100 TABLET ORAL DAILY
COMMUNITY

## 2023-05-08 RX ORDER — PROPRANOLOL HYDROCHLORIDE 20 MG/1
20 TABLET ORAL 3 TIMES DAILY
COMMUNITY

## 2023-05-08 RX ORDER — LORAZEPAM 2 MG/ML
1 INJECTION INTRAMUSCULAR ONCE
Status: COMPLETED | OUTPATIENT
Start: 2023-05-08 | End: 2023-05-08

## 2023-05-08 RX ADMIN — LORAZEPAM 1 MG: 2 INJECTION INTRAMUSCULAR; INTRAVENOUS at 22:36

## 2023-05-08 ASSESSMENT — PAIN - FUNCTIONAL ASSESSMENT
PAIN_FUNCTIONAL_ASSESSMENT: 0-10
PAIN_FUNCTIONAL_ASSESSMENT: PREVENTS OR INTERFERES SOME ACTIVE ACTIVITIES AND ADLS
PAIN_FUNCTIONAL_ASSESSMENT: 0-10

## 2023-05-08 ASSESSMENT — PAIN DESCRIPTION - LOCATION
LOCATION: CHEST
LOCATION_2: HEAD
LOCATION: CHEST

## 2023-05-08 ASSESSMENT — PAIN DESCRIPTION - FREQUENCY: FREQUENCY: CONTINUOUS

## 2023-05-08 ASSESSMENT — PAIN DESCRIPTION - ONSET: ONSET: ON-GOING

## 2023-05-08 ASSESSMENT — PAIN SCALES - GENERAL: PAINLEVEL_OUTOF10: 1

## 2023-05-08 ASSESSMENT — PAIN DESCRIPTION - DESCRIPTORS
DESCRIPTORS: TIGHTNESS
DESCRIPTORS: TIGHTNESS
DESCRIPTORS_2: ACHING

## 2023-05-08 ASSESSMENT — PAIN DESCRIPTION - ORIENTATION
ORIENTATION: MID
ORIENTATION_2: RIGHT;ANTERIOR

## 2023-05-08 ASSESSMENT — LIFESTYLE VARIABLES: HOW OFTEN DO YOU HAVE A DRINK CONTAINING ALCOHOL: MONTHLY OR LESS

## 2023-05-08 ASSESSMENT — PAIN DESCRIPTION - INTENSITY: RATING_2: 7

## 2023-05-09 VITALS
RESPIRATION RATE: 14 BRPM | DIASTOLIC BLOOD PRESSURE: 91 MMHG | SYSTOLIC BLOOD PRESSURE: 126 MMHG | HEART RATE: 69 BPM | TEMPERATURE: 98.8 F | OXYGEN SATURATION: 98 % | WEIGHT: 179.3 LBS

## 2023-05-09 LAB
EKG ATRIAL RATE: 69 BPM
EKG ATRIAL RATE: 75 BPM
EKG DIAGNOSIS: NORMAL
EKG DIAGNOSIS: NORMAL
EKG P AXIS: 13 DEGREES
EKG P AXIS: 28 DEGREES
EKG P-R INTERVAL: 146 MS
EKG P-R INTERVAL: 158 MS
EKG Q-T INTERVAL: 392 MS
EKG Q-T INTERVAL: 404 MS
EKG QRS DURATION: 82 MS
EKG QRS DURATION: 84 MS
EKG QTC CALCULATION (BAZETT): 432 MS
EKG QTC CALCULATION (BAZETT): 437 MS
EKG R AXIS: 28 DEGREES
EKG R AXIS: 37 DEGREES
EKG T AXIS: 23 DEGREES
EKG T AXIS: 25 DEGREES
EKG VENTRICULAR RATE: 69 BPM
EKG VENTRICULAR RATE: 75 BPM
TROPONIN, HIGH SENSITIVITY: <6 NG/L (ref 0–14)

## 2023-05-09 PROCEDURE — 36415 COLL VENOUS BLD VENIPUNCTURE: CPT

## 2023-05-09 PROCEDURE — 84484 ASSAY OF TROPONIN QUANT: CPT

## 2023-05-09 PROCEDURE — 93005 ELECTROCARDIOGRAM TRACING: CPT | Performed by: EMERGENCY MEDICINE

## 2023-05-09 PROCEDURE — 93010 ELECTROCARDIOGRAM REPORT: CPT | Performed by: INTERNAL MEDICINE

## 2023-05-09 ASSESSMENT — HEART SCORE: ECG: 0

## 2023-05-09 ASSESSMENT — PAIN - FUNCTIONAL ASSESSMENT
PAIN_FUNCTIONAL_ASSESSMENT: NONE - DENIES PAIN
PAIN_FUNCTIONAL_ASSESSMENT: NONE - DENIES PAIN

## 2023-05-09 NOTE — ED NOTES
Pt resting quietly, states numbness is gone, headache is gone. States she still has [place] little\" chest tightness. Pt awake, alert. States she feels \"like i'm tripping\". States she feels more relaxed. Spouse at bedside.       Mani Gardiner RN  05/08/23 2900

## 2023-05-09 NOTE — ED TRIAGE NOTES
Pt brought to ED per family, placed in wheelchair, able to get self from wheelchair to bed without assistance. Pt crying, states she has numbness to left side of her face, left lower leg, chest tightness, and headache. Pt states she noticed her blood pressure was high around 4pm, took Propranolol. States about 30 minutes ago she developed the numbness, chest tightness and headache. Pt speech clear, appropriate. Smile symmetrical. Strong equal hand grasps and foot pushes/ pulls. No mouth droop noted.

## 2023-05-27 ENCOUNTER — TELEPHONE (OUTPATIENT)
Dept: FAMILY MEDICINE CLINIC | Age: 33
End: 2023-05-27

## 2023-07-14 ENCOUNTER — TELEPHONE (OUTPATIENT)
Dept: PULMONOLOGY | Age: 33
End: 2023-07-14

## 2023-07-14 DIAGNOSIS — F41.9 ANXIETY: ICD-10-CM

## 2023-07-14 DIAGNOSIS — F39 MOOD DISORDER (HCC): ICD-10-CM

## 2023-07-14 RX ORDER — FLUOXETINE HYDROCHLORIDE 20 MG/1
60 CAPSULE ORAL DAILY
Qty: 90 CAPSULE | Refills: 4 | OUTPATIENT
Start: 2023-07-14

## 2023-07-14 RX ORDER — LAMOTRIGINE 100 MG/1
100 TABLET ORAL DAILY
Qty: 30 TABLET | OUTPATIENT
Start: 2023-07-14

## 2023-07-14 RX ORDER — PROPRANOLOL HYDROCHLORIDE 10 MG/1
10 TABLET ORAL 3 TIMES DAILY PRN
Qty: 90 TABLET | Refills: 2 | OUTPATIENT
Start: 2023-07-14

## 2023-07-14 RX ORDER — QUETIAPINE FUMARATE 25 MG/1
25 TABLET, FILM COATED ORAL 2 TIMES DAILY
Qty: 60 TABLET | OUTPATIENT
Start: 2023-07-14

## 2023-07-17 DIAGNOSIS — G47.11 IDIOPATHIC HYPERSOMNIA: ICD-10-CM

## 2023-07-17 RX ORDER — MODAFINIL 200 MG/1
TABLET ORAL
Qty: 30 TABLET | Refills: 5 | Status: SHIPPED | OUTPATIENT
Start: 2023-07-17 | End: 2027-07-10

## 2023-07-18 ENCOUNTER — OFFICE VISIT (OUTPATIENT)
Dept: FAMILY MEDICINE CLINIC | Age: 33
End: 2023-07-18
Payer: COMMERCIAL

## 2023-07-18 VITALS
BODY MASS INDEX: 33.79 KG/M2 | OXYGEN SATURATION: 98 % | SYSTOLIC BLOOD PRESSURE: 130 MMHG | WEIGHT: 179 LBS | DIASTOLIC BLOOD PRESSURE: 82 MMHG | HEART RATE: 100 BPM | HEIGHT: 61 IN

## 2023-07-18 DIAGNOSIS — F41.9 ANXIETY: ICD-10-CM

## 2023-07-18 DIAGNOSIS — I10 PRIMARY HYPERTENSION: ICD-10-CM

## 2023-07-18 DIAGNOSIS — G47.11 IDIOPATHIC HYPERSOMNIA: ICD-10-CM

## 2023-07-18 DIAGNOSIS — R20.0 HAND NUMBNESS: ICD-10-CM

## 2023-07-18 DIAGNOSIS — F33.42 RECURRENT MAJOR DEPRESSIVE DISORDER, IN FULL REMISSION (HCC): ICD-10-CM

## 2023-07-18 DIAGNOSIS — F39 MOOD DISORDER (HCC): Primary | ICD-10-CM

## 2023-07-18 PROCEDURE — 1036F TOBACCO NON-USER: CPT

## 2023-07-18 PROCEDURE — G8427 DOCREV CUR MEDS BY ELIG CLIN: HCPCS

## 2023-07-18 PROCEDURE — G8417 CALC BMI ABV UP PARAM F/U: HCPCS

## 2023-07-18 PROCEDURE — 99214 OFFICE O/P EST MOD 30 MIN: CPT

## 2023-07-18 PROCEDURE — 3074F SYST BP LT 130 MM HG: CPT

## 2023-07-18 PROCEDURE — 3078F DIAST BP <80 MM HG: CPT

## 2023-07-18 RX ORDER — LAMOTRIGINE 25 MG/1
50 TABLET ORAL DAILY
Qty: 60 TABLET | Refills: 5 | Status: SHIPPED | OUTPATIENT
Start: 2023-07-18

## 2023-07-18 RX ORDER — QUETIAPINE FUMARATE 25 MG/1
TABLET, FILM COATED ORAL
Qty: 30 TABLET | Refills: 5 | Status: SHIPPED | OUTPATIENT
Start: 2023-07-18

## 2023-07-18 RX ORDER — FLUOXETINE HYDROCHLORIDE 20 MG/1
60 CAPSULE ORAL DAILY
Qty: 90 CAPSULE | Refills: 5 | Status: SHIPPED | OUTPATIENT
Start: 2023-07-18

## 2023-07-18 RX ORDER — PROPRANOLOL HYDROCHLORIDE 10 MG/1
10 TABLET ORAL 3 TIMES DAILY
Qty: 90 TABLET | Refills: 5 | Status: SHIPPED | OUTPATIENT
Start: 2023-07-18

## 2023-07-18 ASSESSMENT — PATIENT HEALTH QUESTIONNAIRE - PHQ9
1. LITTLE INTEREST OR PLEASURE IN DOING THINGS: 1
4. FEELING TIRED OR HAVING LITTLE ENERGY: 2
SUM OF ALL RESPONSES TO PHQ9 QUESTIONS 1 & 2: 2
SUM OF ALL RESPONSES TO PHQ QUESTIONS 1-9: 8
7. TROUBLE CONCENTRATING ON THINGS, SUCH AS READING THE NEWSPAPER OR WATCHING TELEVISION: 1
3. TROUBLE FALLING OR STAYING ASLEEP: 2
10. IF YOU CHECKED OFF ANY PROBLEMS, HOW DIFFICULT HAVE THESE PROBLEMS MADE IT FOR YOU TO DO YOUR WORK, TAKE CARE OF THINGS AT HOME, OR GET ALONG WITH OTHER PEOPLE: 1
SUM OF ALL RESPONSES TO PHQ QUESTIONS 1-9: 8
2. FEELING DOWN, DEPRESSED OR HOPELESS: 1
SUM OF ALL RESPONSES TO PHQ QUESTIONS 1-9: 8
8. MOVING OR SPEAKING SO SLOWLY THAT OTHER PEOPLE COULD HAVE NOTICED. OR THE OPPOSITE, BEING SO FIGETY OR RESTLESS THAT YOU HAVE BEEN MOVING AROUND A LOT MORE THAN USUAL: 0
5. POOR APPETITE OR OVEREATING: 0
SUM OF ALL RESPONSES TO PHQ QUESTIONS 1-9: 8
9. THOUGHTS THAT YOU WOULD BE BETTER OFF DEAD, OR OF HURTING YOURSELF: 0
6. FEELING BAD ABOUT YOURSELF - OR THAT YOU ARE A FAILURE OR HAVE LET YOURSELF OR YOUR FAMILY DOWN: 1

## 2023-07-18 ASSESSMENT — ENCOUNTER SYMPTOMS
ABDOMINAL PAIN: 0
SHORTNESS OF BREATH: 0

## 2023-07-18 NOTE — PROGRESS NOTES
7/18/2023    This is a 29 y.o. female   Chief Complaint   Patient presents with    Depression     F/u mood, get med refills   . HPI    Here today to check in and get medication refills. BP concerns- a couple of months ago, was in ER for BP elevated to 172/108  Does have a BP cuff at home, but has not been checking it. Mood has been good on current medications and she is taking them regularly  Good control of anxiety, not feeling down. Right hand is going numb at night- wakes her at night  Also goes numb during the day when using computer  Has had EMG many years ago, but did not confirm CTS at the time. She has just been monitoring this, but it is starting to affect her daily life. Patient Active Problem List   Diagnosis    Allergic rhinitis, seasonal    Migraine    Recurrent major depressive disorder, in full remission ([address])    Dysfunctional uterine bleeding    Other congenital anomaly of lower limb, including pelvic girdle    Congenital anomaly of upper limb    Anxiety    Chronic bilateral low back pain without sciatica    ERVIN (obstructive sleep apnea)    Idiopathic hypersomnia    Mood disorder ([address])    Tobacco dependence    Obesity (BMI 30.0-34. 9)    COVID-19 vaccination declined       Current Outpatient Medications   Medication Sig Dispense Refill    QUEtiapine (SEROQUEL) 25 MG tablet TAKE ONE TABLET BY MOUTH ONCE NIGHTLY 30 tablet 5    lamoTRIgine (LAMICTAL) 25 MG tablet Take 2 tablets by mouth daily 60 tablet 5    FLUoxetine (PROZAC) 20 MG capsule Take 3 capsules by mouth daily 90 capsule 5    propranolol (INDERAL) 10 MG tablet Take 1 tablet by mouth 3 times daily 90 tablet 5    modafinil (PROVIGIL) 200 MG tablet One tab QAM 30 tablet 5     No current facility-administered medications for this visit.        Allergies   Allergen Reactions    Depo-Provera [Medroxyprogesterone Acetate]      Bleeding for 6 months straight    Promethazine Nausea Only    Phenergan [Promethazine] Nausea And

## 2023-07-23 PROBLEM — F33.9 MAJOR DEPRESSION, RECURRENT (HCC): Status: RESOLVED | Noted: 2021-10-18 | Resolved: 2023-07-23

## 2023-10-18 ENCOUNTER — OFFICE VISIT (OUTPATIENT)
Dept: BEHAVIORAL/MENTAL HEALTH CLINIC | Age: 33
End: 2023-10-18
Payer: COMMERCIAL

## 2023-10-18 DIAGNOSIS — F31.73 BIPOLAR DISORDER, IN PARTIAL REMISSION, MOST RECENT EPISODE MANIC (HCC): Primary | ICD-10-CM

## 2023-10-18 PROCEDURE — 90837 PSYTX W PT 60 MINUTES: CPT

## 2023-10-18 PROCEDURE — 1036F TOBACCO NON-USER: CPT
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
## 2023-11-01 ENCOUNTER — OFFICE VISIT (OUTPATIENT)
Dept: BEHAVIORAL/MENTAL HEALTH CLINIC | Age: 33
End: 2023-11-01
Payer: COMMERCIAL

## 2023-11-01 DIAGNOSIS — F31.73 BIPOLAR DISORDER, IN PARTIAL REMISSION, MOST RECENT EPISODE MANIC (HCC): Primary | ICD-10-CM

## 2023-11-01 PROCEDURE — 1036F TOBACCO NON-USER: CPT

## 2023-11-01 PROCEDURE — 90837 PSYTX W PT 60 MINUTES: CPT

## 2024-02-08 DIAGNOSIS — G47.11 IDIOPATHIC HYPERSOMNIA: ICD-10-CM

## 2024-02-12 RX ORDER — MODAFINIL 200 MG/1
TABLET ORAL
Qty: 30 TABLET | Refills: 5 | OUTPATIENT
Start: 2024-02-12 | End: 2028-02-01

## 2024-04-24 DIAGNOSIS — G47.11 IDIOPATHIC HYPERSOMNIA: ICD-10-CM

## 2024-04-24 DIAGNOSIS — F41.9 ANXIETY: ICD-10-CM

## 2024-04-24 DIAGNOSIS — F39 MOOD DISORDER (HCC): ICD-10-CM

## 2024-04-24 RX ORDER — FLUOXETINE HYDROCHLORIDE 20 MG/1
60 CAPSULE ORAL DAILY
Qty: 90 CAPSULE | Refills: 5 | OUTPATIENT
Start: 2024-04-24

## 2024-04-24 RX ORDER — MODAFINIL 200 MG/1
TABLET ORAL
Qty: 30 TABLET | Refills: 5 | Status: SHIPPED | OUTPATIENT
Start: 2024-04-24 | End: 2028-04-17

## 2024-04-24 RX ORDER — QUETIAPINE FUMARATE 25 MG/1
TABLET, FILM COATED ORAL
Qty: 30 TABLET | Refills: 5 | OUTPATIENT
Start: 2024-04-24

## 2024-04-24 RX ORDER — PROPRANOLOL HYDROCHLORIDE 10 MG/1
10 TABLET ORAL 3 TIMES DAILY
Qty: 90 TABLET | Refills: 5 | OUTPATIENT
Start: 2024-04-24

## 2024-04-24 RX ORDER — LAMOTRIGINE 25 MG/1
50 TABLET ORAL DAILY
Qty: 60 TABLET | Refills: 5 | OUTPATIENT
Start: 2024-04-24

## 2024-04-24 NOTE — TELEPHONE ENCOUNTER
Last appt: 10/18/2022    Next appt: Visit date not found    Medication matches medication on Epic list

## 2024-04-24 NOTE — TELEPHONE ENCOUNTER
Was due for check up in January. Has no appointment scheduled.  Supposed to be seen every 6 months for alternating physical and mood check up.    Needs to schedule to get a small refill.

## 2024-04-29 ENCOUNTER — TELEMEDICINE (OUTPATIENT)
Dept: FAMILY MEDICINE CLINIC | Age: 34
End: 2024-04-29
Payer: COMMERCIAL

## 2024-04-29 DIAGNOSIS — F33.42 RECURRENT MAJOR DEPRESSIVE DISORDER, IN FULL REMISSION (HCC): Primary | ICD-10-CM

## 2024-04-29 DIAGNOSIS — F41.9 ANXIETY: ICD-10-CM

## 2024-04-29 DIAGNOSIS — F39 MOOD DISORDER (HCC): ICD-10-CM

## 2024-04-29 DIAGNOSIS — G47.419 NARCOLEPSY WITHOUT CATAPLEXY: ICD-10-CM

## 2024-04-29 DIAGNOSIS — F31.73 BIPOLAR DISORDER, IN PARTIAL REMISSION, MOST RECENT EPISODE MANIC (HCC): ICD-10-CM

## 2024-04-29 PROCEDURE — G8427 DOCREV CUR MEDS BY ELIG CLIN: HCPCS

## 2024-04-29 PROCEDURE — 1036F TOBACCO NON-USER: CPT

## 2024-04-29 PROCEDURE — G8417 CALC BMI ABV UP PARAM F/U: HCPCS

## 2024-04-29 PROCEDURE — 99214 OFFICE O/P EST MOD 30 MIN: CPT

## 2024-04-29 RX ORDER — PROPRANOLOL HYDROCHLORIDE 10 MG/1
10 TABLET ORAL 2 TIMES DAILY
Qty: 60 TABLET | Refills: 5 | Status: SHIPPED | OUTPATIENT
Start: 2024-04-29

## 2024-04-29 RX ORDER — LAMOTRIGINE 25 MG/1
50 TABLET ORAL DAILY
Qty: 60 TABLET | Refills: 5 | Status: SHIPPED | OUTPATIENT
Start: 2024-04-29

## 2024-04-29 RX ORDER — FLUOXETINE HYDROCHLORIDE 20 MG/1
60 CAPSULE ORAL DAILY
Qty: 90 CAPSULE | Refills: 5 | Status: SHIPPED | OUTPATIENT
Start: 2024-04-29

## 2024-04-29 RX ORDER — QUETIAPINE FUMARATE 25 MG/1
TABLET, FILM COATED ORAL
Qty: 30 TABLET | Refills: 5 | Status: SHIPPED | OUTPATIENT
Start: 2024-04-29

## 2024-04-29 SDOH — ECONOMIC STABILITY: FOOD INSECURITY: WITHIN THE PAST 12 MONTHS, THE FOOD YOU BOUGHT JUST DIDN'T LAST AND YOU DIDN'T HAVE MONEY TO GET MORE.: NEVER TRUE

## 2024-04-29 SDOH — ECONOMIC STABILITY: FOOD INSECURITY: WITHIN THE PAST 12 MONTHS, YOU WORRIED THAT YOUR FOOD WOULD RUN OUT BEFORE YOU GOT MONEY TO BUY MORE.: NEVER TRUE

## 2024-04-29 SDOH — ECONOMIC STABILITY: INCOME INSECURITY: HOW HARD IS IT FOR YOU TO PAY FOR THE VERY BASICS LIKE FOOD, HOUSING, MEDICAL CARE, AND HEATING?: SOMEWHAT HARD

## 2024-04-29 SDOH — ECONOMIC STABILITY: HOUSING INSECURITY
IN THE LAST 12 MONTHS, WAS THERE A TIME WHEN YOU DID NOT HAVE A STEADY PLACE TO SLEEP OR SLEPT IN A SHELTER (INCLUDING NOW)?: NO

## 2024-04-29 ASSESSMENT — PATIENT HEALTH QUESTIONNAIRE - PHQ9
5. POOR APPETITE OR OVEREATING: NEARLY EVERY DAY
3. TROUBLE FALLING OR STAYING ASLEEP: MORE THAN HALF THE DAYS
8. MOVING OR SPEAKING SO SLOWLY THAT OTHER PEOPLE COULD HAVE NOTICED. OR THE OPPOSITE, BEING SO FIGETY OR RESTLESS THAT YOU HAVE BEEN MOVING AROUND A LOT MORE THAN USUAL: NOT AT ALL
SUM OF ALL RESPONSES TO PHQ QUESTIONS 1-9: 11
1. LITTLE INTEREST OR PLEASURE IN DOING THINGS: SEVERAL DAYS
6. FEELING BAD ABOUT YOURSELF - OR THAT YOU ARE A FAILURE OR HAVE LET YOURSELF OR YOUR FAMILY DOWN: SEVERAL DAYS
4. FEELING TIRED OR HAVING LITTLE ENERGY: MORE THAN HALF THE DAYS
SUM OF ALL RESPONSES TO PHQ QUESTIONS 1-9: 11
7. TROUBLE CONCENTRATING ON THINGS, SUCH AS READING THE NEWSPAPER OR WATCHING TELEVISION: MORE THAN HALF THE DAYS
SUM OF ALL RESPONSES TO PHQ QUESTIONS 1-9: 11
2. FEELING DOWN, DEPRESSED OR HOPELESS: NOT AT ALL
SUM OF ALL RESPONSES TO PHQ9 QUESTIONS 1 & 2: 1
10. IF YOU CHECKED OFF ANY PROBLEMS, HOW DIFFICULT HAVE THESE PROBLEMS MADE IT FOR YOU TO DO YOUR WORK, TAKE CARE OF THINGS AT HOME, OR GET ALONG WITH OTHER PEOPLE: SOMEWHAT DIFFICULT
SUM OF ALL RESPONSES TO PHQ QUESTIONS 1-9: 11
9. THOUGHTS THAT YOU WOULD BE BETTER OFF DEAD, OR OF HURTING YOURSELF: NOT AT ALL

## 2024-04-29 ASSESSMENT — ENCOUNTER SYMPTOMS
ABDOMINAL PAIN: 0
SHORTNESS OF BREATH: 0

## 2024-04-29 NOTE — PROGRESS NOTES
Marie Suarez (:  1990) is a Established patient, here for evaluation of the following:    Depression (F/u for mood and refills)      Assessment & Plan   Below is the assessment and plan developed based on review of pertinent history, physical exam, labs, studies, and medications.  1. Recurrent major depressive disorder, in full remission (HCC)  2. Bipolar disorder, in partial remission, most recent episode manic (HCC)  3. Mood disorder (HCC)  -     FLUoxetine (PROZAC) 20 MG capsule; Take 3 capsules by mouth daily, Disp-90 capsule, R-5Normal  -     lamoTRIgine (LAMICTAL) 25 MG tablet; Take 2 tablets by mouth daily, Disp-60 tablet, R-5Last refill without office visit.Normal  -     QUEtiapine (SEROQUEL) 25 MG tablet; TAKE ONE TABLET BY MOUTH ONCE NIGHTLY, Disp-30 tablet, R-5Normal  4. Anxiety  -     FLUoxetine (PROZAC) 20 MG capsule; Take 3 capsules by mouth daily, Disp-90 capsule, R-5Normal  -     propranolol (INDERAL) 10 MG tablet; Take 1 tablet by mouth 2 times daily, Disp-60 tablet, R-5Normal  -     QUEtiapine (SEROQUEL) 25 MG tablet; TAKE ONE TABLET BY MOUTH ONCE NIGHTLY, Disp-30 tablet, R-5Normal    Medications refilled- discussed importance of medication compliance  Schedule soon with [facility]  Advised that she needs to schedule with [facility] for medication management    5. Narcolepsy without cataplexy  Stable- continue to follow with sleep specialist    Return in about 6 months (around 10/29/2024).       Subjective   HPI  Needs refill on her medications  Has been off her meds for a couple of months  Having fatigue and low motivation due to not having her meds  She is getting about 6-7 hours of interrupted sleep without her seroquel  Was being seen by psychiatry, but no longer under the care of a psychiatrist  Current medications include:  Seroquel 25 mg nightly  Lamictal 50 mg  Prozac 60 mg  Has seen Rashida a few times in the past- needs to schedule with her again soon    No SI,

## 2024-07-24 ENCOUNTER — TELEPHONE (OUTPATIENT)
Dept: ADMINISTRATIVE | Age: 34
End: 2024-07-24

## 2024-07-24 NOTE — TELEPHONE ENCOUNTER
Submitted PA for MODAFINIL  Via [facility] Key: [id number]  STATUS: PENDING.    This was sent with an office note and the sleep study test    Follow up done daily; if no decision with in three days we will refax.  If another three days goes by with no decision will call the insurance for status.

## 2024-07-29 NOTE — TELEPHONE ENCOUNTER
The medication was DENIED; DENIAL letter uploaded to MED[employer].    Office note and sleep study were sent to the insurance.    If you want an APPEAL; please note in this encounter what new information you would like to APPEAL with.  Once complete route back to PA POOL.    If this requires a response please respond to the pool ( P [facility] MEDICATION PRE-AUTH).      Thank you please advise patient.

## 2024-09-09 ENCOUNTER — HOSPITAL ENCOUNTER (EMERGENCY)
Age: 34
Discharge: HOME | End: 2024-09-09
Attending: EMERGENCY MEDICINE
Payer: COMMERCIAL

## 2024-09-09 VITALS
TEMPERATURE: 98.5 F | HEART RATE: 78 BPM | WEIGHT: 167.11 LBS | OXYGEN SATURATION: 99 % | BODY MASS INDEX: 31.57 KG/M2 | DIASTOLIC BLOOD PRESSURE: 89 MMHG | SYSTOLIC BLOOD PRESSURE: 148 MMHG | RESPIRATION RATE: 16 BRPM

## 2024-09-09 DIAGNOSIS — N39.0 URINARY TRACT INFECTION WITH HEMATURIA, SITE UNSPECIFIED: Primary | ICD-10-CM

## 2024-09-09 DIAGNOSIS — R31.9 URINARY TRACT INFECTION WITH HEMATURIA, SITE UNSPECIFIED: Primary | ICD-10-CM

## 2024-09-09 LAB
BACTERIA URNS QL MICRO: ABNORMAL /HPF
BILIRUB UR QL STRIP.AUTO: NEGATIVE
CLARITY UR: ABNORMAL
COLOR UR: YELLOW
EPI CELLS #/AREA URNS HPF: ABNORMAL /HPF (ref 0–5)
GLUCOSE UR STRIP.AUTO-MCNC: NEGATIVE MG/DL
HCG UR QL: NEGATIVE
HGB UR QL STRIP.AUTO: ABNORMAL
KETONES UR STRIP.AUTO-MCNC: NEGATIVE MG/DL
LEUKOCYTE ESTERASE UR QL STRIP.AUTO: ABNORMAL
NITRITE UR QL STRIP.AUTO: NEGATIVE
PH UR STRIP.AUTO: 6 [PH] (ref 5–8)
PROT UR STRIP.AUTO-MCNC: 100 MG/DL
RBC #/AREA URNS HPF: >100 /HPF (ref 0–4)
SP GR UR STRIP.AUTO: 1.02 (ref 1–1.03)
UA COMPLETE W REFLEX CULTURE PNL UR: YES
UA DIPSTICK W REFLEX MICRO PNL UR: YES
URN SPEC COLLECT METH UR: ABNORMAL
UROBILINOGEN UR STRIP-ACNC: 0.2 E.U./DL
WBC #/AREA URNS HPF: ABNORMAL /HPF (ref 0–5)

## 2024-09-09 PROCEDURE — 81001 URINALYSIS AUTO W/SCOPE: CPT

## 2024-09-09 PROCEDURE — 87086 URINE CULTURE/COLONY COUNT: CPT

## 2024-09-09 PROCEDURE — 99283 EMERGENCY DEPT VISIT LOW MDM: CPT

## 2024-09-09 PROCEDURE — 6370000000 HC RX 637 (ALT 250 FOR IP): Performed by: EMERGENCY MEDICINE

## 2024-09-09 PROCEDURE — 84703 CHORIONIC GONADOTROPIN ASSAY: CPT

## 2024-09-09 RX ORDER — PHENAZOPYRIDINE HYDROCHLORIDE 100 MG/1
100 TABLET, FILM COATED ORAL 3 TIMES DAILY PRN
Qty: 6 TABLET | Refills: 0 | Status: SHIPPED | OUTPATIENT
Start: 2024-09-09 | End: 2024-09-12

## 2024-09-09 RX ORDER — CEFUROXIME AXETIL 250 MG/1
250 TABLET ORAL 2 TIMES DAILY
Qty: 14 TABLET | Refills: 0 | Status: SHIPPED | OUTPATIENT
Start: 2024-09-09 | End: 2024-09-16

## 2024-09-09 RX ORDER — CEFUROXIME AXETIL 250 MG/1
250 TABLET ORAL ONCE
Status: COMPLETED | OUTPATIENT
Start: 2024-09-09 | End: 2024-09-09

## 2024-09-09 RX ORDER — PHENAZOPYRIDINE HYDROCHLORIDE 200 MG/1
200 TABLET, FILM COATED ORAL ONCE
Status: COMPLETED | OUTPATIENT
Start: 2024-09-09 | End: 2024-09-09

## 2024-09-09 RX ADMIN — CEFUROXIME AXETIL 250 MG: 250 TABLET ORAL at 05:51

## 2024-09-09 RX ADMIN — PHENAZOPYRIDINE 200 MG: 200 TABLET ORAL at 05:51

## 2024-09-09 ASSESSMENT — PAIN - FUNCTIONAL ASSESSMENT
PAIN_FUNCTIONAL_ASSESSMENT: 0-10
PAIN_FUNCTIONAL_ASSESSMENT: 0-10

## 2024-09-09 ASSESSMENT — PAIN SCALES - GENERAL
PAINLEVEL_OUTOF10: 4

## 2024-09-09 ASSESSMENT — PAIN DESCRIPTION - LOCATION: LOCATION: PELVIS

## 2024-09-09 ASSESSMENT — PAIN DESCRIPTION - DIRECTION: RADIATING_TOWARDS: RIGHT FLANK AREA

## 2024-09-09 ASSESSMENT — LIFESTYLE VARIABLES: HOW OFTEN DO YOU HAVE A DRINK CONTAINING ALCOHOL: NEVER

## 2024-09-10 ENCOUNTER — OFFICE VISIT (OUTPATIENT)
Dept: BEHAVIORAL/MENTAL HEALTH CLINIC | Age: 34
End: 2024-09-10
Payer: COMMERCIAL

## 2024-09-10 DIAGNOSIS — F31.75 BIPOLAR DISORDER, IN PARTIAL REMISSION, MOST RECENT EPISODE DEPRESSED (HCC): Primary | ICD-10-CM

## 2024-09-10 LAB — BACTERIA UR CULT: NORMAL

## 2024-09-10 PROCEDURE — 4004F PT TOBACCO SCREEN RCVD TLK: CPT

## 2024-09-10 PROCEDURE — 90837 PSYTX W PT 60 MINUTES: CPT

## 2024-10-14 ENCOUNTER — OFFICE VISIT (OUTPATIENT)
Dept: BEHAVIORAL/MENTAL HEALTH CLINIC | Age: 34
End: 2024-10-14
Payer: COMMERCIAL

## 2024-10-14 DIAGNOSIS — F31.75 BIPOLAR DISORDER, IN PARTIAL REMISSION, MOST RECENT EPISODE DEPRESSED (HCC): Primary | ICD-10-CM

## 2024-10-14 PROCEDURE — 90837 PSYTX W PT 60 MINUTES: CPT

## 2024-10-14 PROCEDURE — 4004F PT TOBACCO SCREEN RCVD TLK: CPT
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
## 2024-11-12 DIAGNOSIS — G47.11 IDIOPATHIC HYPERSOMNIA: ICD-10-CM

## 2024-11-12 RX ORDER — MODAFINIL 200 MG/1
TABLET ORAL
Qty: 30 TABLET | OUTPATIENT
Start: 2024-11-12

## 2024-11-12 RX ORDER — MODAFINIL 200 MG/1
TABLET ORAL
Qty: 30 TABLET | Refills: 5 | OUTPATIENT
Start: 2024-11-12 | End: 2028-11-05

## 2024-12-10 ENCOUNTER — TELEMEDICINE (OUTPATIENT)
Dept: BEHAVIORAL/MENTAL HEALTH CLINIC | Age: 34
End: 2024-12-10
Payer: COMMERCIAL

## 2024-12-10 DIAGNOSIS — F31.75 BIPOLAR DISORDER, IN PARTIAL REMISSION, MOST RECENT EPISODE DEPRESSED (HCC): Primary | ICD-10-CM

## 2024-12-10 PROCEDURE — 90834 PSYTX W PT 45 MINUTES: CPT

## 2024-12-10 PROCEDURE — 4004F PT TOBACCO SCREEN RCVD TLK: CPT

## 2024-12-11 DIAGNOSIS — G47.11 IDIOPATHIC HYPERSOMNIA: ICD-10-CM

## 2024-12-11 RX ORDER — MODAFINIL 200 MG/1
TABLET ORAL
Qty: 30 TABLET | Refills: 5 | Status: CANCELLED | OUTPATIENT
Start: 2024-12-11 | End: 2028-12-04

## 2024-12-12 ENCOUNTER — OFFICE VISIT (OUTPATIENT)
Dept: SLEEP MEDICINE | Age: 34
End: 2024-12-12

## 2024-12-12 ENCOUNTER — TELEPHONE (OUTPATIENT)
Dept: ADMINISTRATIVE | Age: 34
End: 2024-12-12

## 2024-12-12 VITALS
DIASTOLIC BLOOD PRESSURE: 80 MMHG | WEIGHT: 173.4 LBS | HEIGHT: 61 IN | OXYGEN SATURATION: 97 % | TEMPERATURE: 98.1 F | BODY MASS INDEX: 32.74 KG/M2 | RESPIRATION RATE: 18 BRPM | SYSTOLIC BLOOD PRESSURE: 130 MMHG | HEART RATE: 95 BPM

## 2024-12-12 DIAGNOSIS — G47.11 IDIOPATHIC HYPERSOMNIA: Primary | ICD-10-CM

## 2024-12-12 RX ORDER — MODAFINIL 200 MG/1
TABLET ORAL
Qty: 30 TABLET | Refills: 5 | Status: SHIPPED | OUTPATIENT
Start: 2024-12-12 | End: 2028-12-05

## 2024-12-12 ASSESSMENT — SLEEP AND FATIGUE QUESTIONNAIRES
HOW LIKELY ARE YOU TO NOD OFF OR FALL ASLEEP WHILE SITTING AND READING: SLIGHT CHANCE OF DOZING
HOW LIKELY ARE YOU TO NOD OFF OR FALL ASLEEP WHILE SITTING INACTIVE IN A PUBLIC PLACE: SLIGHT CHANCE OF DOZING
HOW LIKELY ARE YOU TO NOD OFF OR FALL ASLEEP WHILE SITTING QUIETLY AFTER LUNCH WITHOUT ALCOHOL: SLIGHT CHANCE OF DOZING
ESS TOTAL SCORE: 10
HOW LIKELY ARE YOU TO NOD OFF OR FALL ASLEEP WHILE SITTING AND TALKING TO SOMEONE: SLIGHT CHANCE OF DOZING
HOW LIKELY ARE YOU TO NOD OFF OR FALL ASLEEP IN A CAR, WHILE STOPPED FOR A FEW MINUTES IN TRAFFIC: SLIGHT CHANCE OF DOZING
HOW LIKELY ARE YOU TO NOD OFF OR FALL ASLEEP WHILE LYING DOWN TO REST IN THE AFTERNOON WHEN CIRCUMSTANCES PERMIT: MODERATE CHANCE OF DOZING
HOW LIKELY ARE YOU TO NOD OFF OR FALL ASLEEP WHEN YOU ARE A PASSENGER IN A CAR FOR AN HOUR WITHOUT A BREAK: MODERATE CHANCE OF DOZING
HOW LIKELY ARE YOU TO NOD OFF OR FALL ASLEEP WHILE WATCHING TV: SLIGHT CHANCE OF DOZING

## 2024-12-12 NOTE — TELEPHONE ENCOUNTER
Submitted PA for Modafinil 200MG tablets   Via [facility] Key: [id number]  STATUS: Approved today by [facility]  Your PA request has been approved. Additional information will be provided in the approval communication. (Message 1148)  Authorization Expiration Date: 12/12/2025    Follow up done daily; if no decision with in three days we will refax.  If another three days goes by with no decision will call the insurance for status.
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
## 2025-04-01 DIAGNOSIS — F41.9 ANXIETY: ICD-10-CM

## 2025-04-01 RX ORDER — PROPRANOLOL HYDROCHLORIDE 10 MG/1
10 TABLET ORAL 2 TIMES DAILY
Qty: 60 TABLET | Refills: 0 | Status: SHIPPED | OUTPATIENT
Start: 2025-04-01

## 2025-04-07 ENCOUNTER — OFFICE VISIT (OUTPATIENT)
Dept: FAMILY MEDICINE CLINIC | Age: 35
End: 2025-04-07
Payer: COMMERCIAL

## 2025-04-07 VITALS
OXYGEN SATURATION: 98 % | SYSTOLIC BLOOD PRESSURE: 118 MMHG | HEIGHT: 61 IN | BODY MASS INDEX: 32.1 KG/M2 | HEART RATE: 82 BPM | DIASTOLIC BLOOD PRESSURE: 82 MMHG | WEIGHT: 170 LBS

## 2025-04-07 DIAGNOSIS — F39 MOOD DISORDER: ICD-10-CM

## 2025-04-07 DIAGNOSIS — F31.73 BIPOLAR DISORDER, IN PARTIAL REMISSION, MOST RECENT EPISODE MANIC (HCC): Primary | ICD-10-CM

## 2025-04-07 DIAGNOSIS — E66.811 OBESITY (BMI 30.0-34.9): ICD-10-CM

## 2025-04-07 DIAGNOSIS — F41.9 ANXIETY: ICD-10-CM

## 2025-04-07 DIAGNOSIS — F33.42 RECURRENT MAJOR DEPRESSIVE DISORDER, IN FULL REMISSION: ICD-10-CM

## 2025-04-07 DIAGNOSIS — Z13.220 SCREENING CHOLESTEROL LEVEL: ICD-10-CM

## 2025-04-07 DIAGNOSIS — G47.419 NARCOLEPSY WITHOUT CATAPLEXY: ICD-10-CM

## 2025-04-07 PROCEDURE — 99214 OFFICE O/P EST MOD 30 MIN: CPT

## 2025-04-07 PROCEDURE — G8427 DOCREV CUR MEDS BY ELIG CLIN: HCPCS

## 2025-04-07 PROCEDURE — G8417 CALC BMI ABV UP PARAM F/U: HCPCS

## 2025-04-07 PROCEDURE — G2211 COMPLEX E/M VISIT ADD ON: HCPCS

## 2025-04-07 PROCEDURE — 1036F TOBACCO NON-USER: CPT

## 2025-04-07 RX ORDER — PROPRANOLOL HYDROCHLORIDE 10 MG/1
10 TABLET ORAL 2 TIMES DAILY
Qty: 180 TABLET | Refills: 1 | Status: SHIPPED | OUTPATIENT
Start: 2025-04-07

## 2025-04-07 RX ORDER — QUETIAPINE FUMARATE 25 MG/1
TABLET, FILM COATED ORAL
Qty: 90 TABLET | Refills: 1 | Status: SHIPPED | OUTPATIENT
Start: 2025-04-07

## 2025-04-07 RX ORDER — LAMOTRIGINE 25 MG/1
50 TABLET ORAL DAILY
Qty: 180 TABLET | Refills: 1 | Status: SHIPPED | OUTPATIENT
Start: 2025-04-07

## 2025-04-07 SDOH — ECONOMIC STABILITY: FOOD INSECURITY: WITHIN THE PAST 12 MONTHS, THE FOOD YOU BOUGHT JUST DIDN'T LAST AND YOU DIDN'T HAVE MONEY TO GET MORE.: NEVER TRUE

## 2025-04-07 SDOH — ECONOMIC STABILITY: FOOD INSECURITY: WITHIN THE PAST 12 MONTHS, YOU WORRIED THAT YOUR FOOD WOULD RUN OUT BEFORE YOU GOT MONEY TO BUY MORE.: NEVER TRUE

## 2025-04-07 ASSESSMENT — PATIENT HEALTH QUESTIONNAIRE - PHQ9
7. TROUBLE CONCENTRATING ON THINGS, SUCH AS READING THE NEWSPAPER OR WATCHING TELEVISION: NOT AT ALL
8. MOVING OR SPEAKING SO SLOWLY THAT OTHER PEOPLE COULD HAVE NOTICED. OR THE OPPOSITE, BEING SO FIGETY OR RESTLESS THAT YOU HAVE BEEN MOVING AROUND A LOT MORE THAN USUAL: SEVERAL DAYS
SUM OF ALL RESPONSES TO PHQ QUESTIONS 1-9: 4
3. TROUBLE FALLING OR STAYING ASLEEP: NOT AT ALL
1. LITTLE INTEREST OR PLEASURE IN DOING THINGS: SEVERAL DAYS
5. POOR APPETITE OR OVEREATING: SEVERAL DAYS
2. FEELING DOWN, DEPRESSED OR HOPELESS: NOT AT ALL
SUM OF ALL RESPONSES TO PHQ QUESTIONS 1-9: 4
SUM OF ALL RESPONSES TO PHQ QUESTIONS 1-9: 4
6. FEELING BAD ABOUT YOURSELF - OR THAT YOU ARE A FAILURE OR HAVE LET YOURSELF OR YOUR FAMILY DOWN: NOT AT ALL
10. IF YOU CHECKED OFF ANY PROBLEMS, HOW DIFFICULT HAVE THESE PROBLEMS MADE IT FOR YOU TO DO YOUR WORK, TAKE CARE OF THINGS AT HOME, OR GET ALONG WITH OTHER PEOPLE: NOT DIFFICULT AT ALL
9. THOUGHTS THAT YOU WOULD BE BETTER OFF DEAD, OR OF HURTING YOURSELF: NOT AT ALL
SUM OF ALL RESPONSES TO PHQ QUESTIONS 1-9: 4
4. FEELING TIRED OR HAVING LITTLE ENERGY: SEVERAL DAYS

## 2025-04-07 NOTE — PROGRESS NOTES
4/7/2025    This is a 35 y.o. female   Chief Complaint   Patient presents with    Medication Check     Needs med refills   .    MILAGROS Haddad is here for routine follow up on chronic conditions.    Concerns: none    Mood has been fine.  Still having some ups and downs but has been manageable  Follows with Rashida Pritchard [facility] monthly  No SI/HI    Sleep is good- getting about 8 hours nightly  Feels rested in AM    Following with sleep specialist for narcolepsy- on modafinil 200 mg QAM     Patient Active Problem List   Diagnosis    Allergic rhinitis, seasonal    Migraine    Recurrent major depressive disorder, in full remission    Dysfunctional uterine bleeding    Other congenital anomaly of lower limb, including pelvic girdle    Congenital anomaly of upper limb    Anxiety    Chronic bilateral low back pain without sciatica    ERVIN (obstructive sleep apnea)    Idiopathic hypersomnia    Mood disorder    Tobacco dependence    Obesity (BMI 30.0-34.9)    COVID-19 vaccination declined    Narcolepsy without cataplexy    Bipolar disorder, in partial remission, most recent episode manic (HCC)       Current Outpatient Medications   Medication Sig Dispense Refill    propranolol (INDERAL) 10 MG tablet Take 1 tablet by mouth 2 times daily 180 tablet 1    FLUoxetine (PROZAC) 20 MG capsule Take 3 capsules by mouth daily 270 capsule 1    lamoTRIgine (LAMICTAL) 25 MG tablet Take 2 tablets by mouth daily 180 tablet 1    QUEtiapine (SEROQUEL) 25 MG tablet TAKE ONE TABLET BY MOUTH ONCE NIGHTLY 90 tablet 1    modafinil (PROVIGIL) 200 MG tablet One tab QAM 30 tablet 5     No current facility-administered medications for this visit.       Allergies   Allergen Reactions    Depo-Provera [Medroxyprogesterone Acetate]      Bleeding for 6 months straight    Promethazine Nausea Only    Phenergan [Promethazine] Nausea And Vomiting       Review of Systems   Constitutional:  Negative for activity change and fever.   Respiratory:  Negative for

## 2025-04-08 DIAGNOSIS — F41.9 ANXIETY: ICD-10-CM

## 2025-04-08 DIAGNOSIS — Z13.220 SCREENING CHOLESTEROL LEVEL: ICD-10-CM

## 2025-04-08 DIAGNOSIS — E66.811 OBESITY (BMI 30.0-34.9): ICD-10-CM

## 2025-04-08 LAB
ALBUMIN SERPL-MCNC: 4.2 G/DL (ref 3.4–5)
ALBUMIN/GLOB SERPL: 1.5 {RATIO} (ref 1.1–2.2)
ALP SERPL-CCNC: 97 U/L (ref 40–129)
ALT SERPL-CCNC: 23 U/L (ref 10–40)
ANION GAP SERPL CALCULATED.3IONS-SCNC: 9 MMOL/L (ref 3–16)
AST SERPL-CCNC: 20 U/L (ref 15–37)
BILIRUB SERPL-MCNC: 0.3 MG/DL (ref 0–1)
BUN SERPL-MCNC: 9 MG/DL (ref 7–20)
CALCIUM SERPL-MCNC: 9.3 MG/DL (ref 8.3–10.6)
CHLORIDE SERPL-SCNC: 105 MMOL/L (ref 99–110)
CHOLEST SERPL-MCNC: 200 MG/DL (ref 0–199)
CO2 SERPL-SCNC: 25 MMOL/L (ref 21–32)
CREAT SERPL-MCNC: 0.7 MG/DL (ref 0.6–1.1)
GFR SERPLBLD CREATININE-BSD FMLA CKD-EPI: >90 ML/MIN/{1.73_M2}
GLUCOSE SERPL-MCNC: 85 MG/DL (ref 70–99)
HDLC SERPL-MCNC: 39 MG/DL (ref 40–60)
LDLC SERPL CALC-MCNC: 146 MG/DL
POTASSIUM SERPL-SCNC: 4.4 MMOL/L (ref 3.5–5.1)
PROT SERPL-MCNC: 7 G/DL (ref 6.4–8.2)
SODIUM SERPL-SCNC: 139 MMOL/L (ref 136–145)
TRIGL SERPL-MCNC: 74 MG/DL (ref 0–150)
TSH SERPL DL<=0.005 MIU/L-ACNC: 1.09 UIU/ML (ref 0.27–4.2)
VLDLC SERPL CALC-MCNC: 15 MG/DL

## 2025-04-08 ASSESSMENT — ENCOUNTER SYMPTOMS
SHORTNESS OF BREATH: 0
ABDOMINAL PAIN: 0

## 2025-04-11 ENCOUNTER — RESULTS FOLLOW-UP (OUTPATIENT)
Dept: FAMILY MEDICINE CLINIC | Age: 35
End: 2025-04-11

## 2025-06-17 ENCOUNTER — OFFICE VISIT (OUTPATIENT)
Dept: SLEEP MEDICINE | Age: 35
End: 2025-06-17
Payer: COMMERCIAL

## 2025-06-17 VITALS
TEMPERATURE: 98.1 F | HEIGHT: 61 IN | OXYGEN SATURATION: 100 % | BODY MASS INDEX: 31.98 KG/M2 | HEART RATE: 83 BPM | DIASTOLIC BLOOD PRESSURE: 80 MMHG | RESPIRATION RATE: 18 BRPM | SYSTOLIC BLOOD PRESSURE: 125 MMHG | WEIGHT: 169.4 LBS

## 2025-06-17 DIAGNOSIS — G47.11 IDIOPATHIC HYPERSOMNIA: Primary | ICD-10-CM

## 2025-06-17 PROBLEM — G47.419 NARCOLEPSY WITHOUT CATAPLEXY: Status: RESOLVED | Noted: 2024-04-29 | Resolved: 2025-06-17

## 2025-06-17 PROCEDURE — 99213 OFFICE O/P EST LOW 20 MIN: CPT | Performed by: PSYCHIATRY & NEUROLOGY

## 2025-06-17 PROCEDURE — G2211 COMPLEX E/M VISIT ADD ON: HCPCS | Performed by: PSYCHIATRY & NEUROLOGY

## 2025-06-17 PROCEDURE — 1036F TOBACCO NON-USER: CPT | Performed by: PSYCHIATRY & NEUROLOGY

## 2025-06-17 PROCEDURE — G8417 CALC BMI ABV UP PARAM F/U: HCPCS | Performed by: PSYCHIATRY & NEUROLOGY

## 2025-06-17 PROCEDURE — G8427 DOCREV CUR MEDS BY ELIG CLIN: HCPCS | Performed by: PSYCHIATRY & NEUROLOGY

## 2025-06-17 RX ORDER — MODAFINIL 200 MG/1
TABLET ORAL
Qty: 60 TABLET | Refills: 5 | Status: SHIPPED | OUTPATIENT
Start: 2025-06-17 | End: 2029-06-10

## 2025-06-17 ASSESSMENT — SLEEP AND FATIGUE QUESTIONNAIRES
HOW LIKELY ARE YOU TO NOD OFF OR FALL ASLEEP WHILE LYING DOWN TO REST IN THE AFTERNOON WHEN CIRCUMSTANCES PERMIT: HIGH CHANCE OF DOZING
HOW LIKELY ARE YOU TO NOD OFF OR FALL ASLEEP WHEN YOU ARE A PASSENGER IN A CAR FOR AN HOUR WITHOUT A BREAK: HIGH CHANCE OF DOZING
HOW LIKELY ARE YOU TO NOD OFF OR FALL ASLEEP WHILE SITTING QUIETLY AFTER LUNCH WITHOUT ALCOHOL: MODERATE CHANCE OF DOZING
HOW LIKELY ARE YOU TO NOD OFF OR FALL ASLEEP WHILE SITTING INACTIVE IN A PUBLIC PLACE: SLIGHT CHANCE OF DOZING
HOW LIKELY ARE YOU TO NOD OFF OR FALL ASLEEP IN A CAR, WHILE STOPPED FOR A FEW MINUTES IN TRAFFIC: SLIGHT CHANCE OF DOZING
ESS TOTAL SCORE: 16
HOW LIKELY ARE YOU TO NOD OFF OR FALL ASLEEP WHILE WATCHING TV: HIGH CHANCE OF DOZING
HOW LIKELY ARE YOU TO NOD OFF OR FALL ASLEEP WHILE SITTING AND READING: MODERATE CHANCE OF DOZING
HOW LIKELY ARE YOU TO NOD OFF OR FALL ASLEEP WHILE SITTING AND TALKING TO SOMEONE: SLIGHT CHANCE OF DOZING

## 2025-06-17 NOTE — PROGRESS NOTES
MD TRISHA Coffey Board Certified in Sleep Medicine  Certified in Behavioral Sleep Medicine  Board Certified in Neurology [facility]  [address]   [address]  [place]  [address]  [phone]   [facility]  [address]  [address]  [place], [place] [address]                      [facility]  [address] [id number] [phone]    Subjective:     Patient ID: Marie Suarez is a 35 y.o. female.    Chief Complaint   Patient presents with    Follow-up       HPI:        Marie Suarez is a 35 y.o. female was seen today as a follow for idiopathic hypersomnia.   Patient improved regarding daytime sleepiness and fatigue, wakes up refreshed in the morning.  The Patient scored Clements Sleepiness Score: 16 on Clements Sleepiness Scale ( more than 10 is indicative of daytime sleepiness)   On Provigil  200 mg QAM, works over 50% to control and feels she needs higher dose.  C/o snoring, but no gasping nor apnea.  DOT/CDL - N/A  he patient underwent comprehensive polysomnogram on 09/19/2021, the overnight registration revealed no significant sleep disordered breathing with apnea hypopnea index of 2.9 with lowest O2 saturation of 89%, patient spent about 0 minutes below 90% (weight was 169 pounds), total sleep time was 419.5 minute with SE 84.2%, sleep onset latency was 69.5 minutes, and REM onset latency was 137.5 minutes,  Subsequently, the patient underwent MSLT , mean sleep latency was 2.4 minutes with one REM episode. ( Was on Prozac while on the study)     Previous Report(s)Reviewed: historical medical records         Social History     Socioeconomic History    Marital status:      Spouse name: Not on file    Number of children: Not on file    Years of education: Not on file    Highest education level: Not on file   Occupational History    Not on file   Tobacco Use

## 2025-06-21 ENCOUNTER — APPOINTMENT (OUTPATIENT)
Dept: GENERAL RADIOLOGY | Age: 35
End: 2025-06-21
Payer: COMMERCIAL

## 2025-06-21 ENCOUNTER — HOSPITAL ENCOUNTER (EMERGENCY)
Age: 35
Discharge: HOME | End: 2025-06-21
Attending: EMERGENCY MEDICINE
Payer: COMMERCIAL

## 2025-06-21 VITALS
OXYGEN SATURATION: 96 % | SYSTOLIC BLOOD PRESSURE: 172 MMHG | TEMPERATURE: 98.9 F | WEIGHT: 169.75 LBS | DIASTOLIC BLOOD PRESSURE: 102 MMHG | HEIGHT: 61 IN | RESPIRATION RATE: 20 BRPM | HEART RATE: 92 BPM | BODY MASS INDEX: 32.05 KG/M2

## 2025-06-21 DIAGNOSIS — E87.6 HYPOKALEMIA: ICD-10-CM

## 2025-06-21 DIAGNOSIS — R07.9 CHEST PAIN, UNSPECIFIED TYPE: Primary | ICD-10-CM

## 2025-06-21 DIAGNOSIS — F41.1 ANXIETY STATE: ICD-10-CM

## 2025-06-21 DIAGNOSIS — R03.0 ELEVATED BLOOD PRESSURE READING: ICD-10-CM

## 2025-06-21 LAB
ALBUMIN SERPL-MCNC: 4.4 G/DL (ref 3.4–5)
ALBUMIN/GLOB SERPL: 1 {RATIO} (ref 1.1–2.2)
ALP SERPL-CCNC: 102 U/L (ref 40–129)
ALT SERPL-CCNC: 18 U/L (ref 10–40)
ANION GAP SERPL CALCULATED.3IONS-SCNC: 14 MMOL/L (ref 3–16)
AST SERPL-CCNC: 21 U/L (ref 15–37)
BASOPHILS # BLD: 0 K/UL (ref 0–0.2)
BASOPHILS NFR BLD: 0.4 %
BILIRUB SERPL-MCNC: 0.3 MG/DL (ref 0–1)
BUN SERPL-MCNC: 9 MG/DL (ref 7–20)
CALCIUM SERPL-MCNC: 8.8 MG/DL (ref 8.3–10.6)
CHLORIDE SERPL-SCNC: 102 MMOL/L (ref 99–110)
CO2 SERPL-SCNC: 21 MMOL/L (ref 21–32)
CREAT SERPL-MCNC: 0.8 MG/DL (ref 0.6–1.1)
D-DIMER QUANTITATIVE: <0.27 UG/ML FEU (ref 0–0.6)
DEPRECATED RDW RBC AUTO: 16.6 % (ref 12.4–15.4)
EOSINOPHIL # BLD: 0.1 K/UL (ref 0–0.6)
EOSINOPHIL NFR BLD: 1.3 %
GFR SERPLBLD CREATININE-BSD FMLA CKD-EPI: >90 ML/MIN/{1.73_M2}
GLUCOSE SERPL-MCNC: 133 MG/DL (ref 70–99)
HCG SERPL QL: NEGATIVE
HCT VFR BLD AUTO: 37.1 % (ref 36–48)
HGB BLD-MCNC: 11.6 G/DL (ref 12–16)
IMM GRANULOCYTES # BLD: 0 K/UL (ref 0–0.2)
IMM GRANULOCYTES NFR BLD: 0.1 %
LYMPHOCYTES # BLD: 1.8 K/UL (ref 1–5.1)
LYMPHOCYTES NFR BLD: 23 %
MCH RBC QN AUTO: 23.8 PG (ref 26–34)
MCHC RBC AUTO-ENTMCNC: 31.3 G/DL (ref 32–36.4)
MCV RBC AUTO: 76.2 FL (ref 80–100)
MONOCYTES # BLD: 0.5 K/UL (ref 0–1.3)
MONOCYTES NFR BLD: 6.6 %
NEUTROPHILS # BLD: 5.4 K/UL (ref 1.7–7.7)
NEUTROPHILS NFR BLD: 68.6 %
PLATELET # BLD AUTO: 435 K/UL (ref 135–450)
PMV BLD AUTO: 9.9 FL (ref 5–10.5)
POTASSIUM SERPL-SCNC: 3.2 MMOL/L (ref 3.5–5.1)
PROT SERPL-MCNC: 8.6 G/DL (ref 6.4–8.2)
RBC # BLD AUTO: 4.87 M/UL (ref 4–5.2)
SODIUM SERPL-SCNC: 137 MMOL/L (ref 136–145)
TROPONIN, HIGH SENSITIVITY: <6 NG/L (ref 0–14)
WBC # BLD AUTO: 7.9 K/UL (ref 4–11)

## 2025-06-21 PROCEDURE — 96374 THER/PROPH/DIAG INJ IV PUSH: CPT

## 2025-06-21 PROCEDURE — 36415 COLL VENOUS BLD VENIPUNCTURE: CPT

## 2025-06-21 PROCEDURE — 84703 CHORIONIC GONADOTROPIN ASSAY: CPT

## 2025-06-21 PROCEDURE — 71045 X-RAY EXAM CHEST 1 VIEW: CPT

## 2025-06-21 PROCEDURE — 6370000000 HC RX 637 (ALT 250 FOR IP): Performed by: EMERGENCY MEDICINE

## 2025-06-21 PROCEDURE — 85025 COMPLETE CBC W/AUTO DIFF WBC: CPT

## 2025-06-21 PROCEDURE — 6360000002 HC RX W HCPCS: Performed by: EMERGENCY MEDICINE

## 2025-06-21 PROCEDURE — 93005 ELECTROCARDIOGRAM TRACING: CPT | Performed by: EMERGENCY MEDICINE

## 2025-06-21 PROCEDURE — 85379 FIBRIN DEGRADATION QUANT: CPT

## 2025-06-21 PROCEDURE — 80053 COMPREHEN METABOLIC PANEL: CPT

## 2025-06-21 PROCEDURE — 84484 ASSAY OF TROPONIN QUANT: CPT

## 2025-06-21 PROCEDURE — 99285 EMERGENCY DEPT VISIT HI MDM: CPT

## 2025-06-21 RX ORDER — LORAZEPAM 1 MG/1
1 TABLET ORAL ONCE
Status: COMPLETED | OUTPATIENT
Start: 2025-06-21 | End: 2025-06-21

## 2025-06-21 RX ORDER — KETOROLAC TROMETHAMINE 30 MG/ML
30 INJECTION, SOLUTION INTRAMUSCULAR; INTRAVENOUS ONCE
Status: COMPLETED | OUTPATIENT
Start: 2025-06-21 | End: 2025-06-21

## 2025-06-21 RX ADMIN — LORAZEPAM 1 MG: 1 TABLET ORAL at 09:15

## 2025-06-21 RX ADMIN — KETOROLAC TROMETHAMINE 30 MG: 30 INJECTION, SOLUTION INTRAMUSCULAR at 09:16

## 2025-06-21 ASSESSMENT — PAIN DESCRIPTION - PAIN TYPE
TYPE: ACUTE PAIN
TYPE: ACUTE PAIN

## 2025-06-21 ASSESSMENT — PAIN - FUNCTIONAL ASSESSMENT
PAIN_FUNCTIONAL_ASSESSMENT: ACTIVITIES ARE NOT PREVENTED
PAIN_FUNCTIONAL_ASSESSMENT: 0-10
PAIN_FUNCTIONAL_ASSESSMENT: ACTIVITIES ARE NOT PREVENTED
PAIN_FUNCTIONAL_ASSESSMENT: 0-10
PAIN_FUNCTIONAL_ASSESSMENT: ACTIVITIES ARE NOT PREVENTED

## 2025-06-21 ASSESSMENT — PAIN DESCRIPTION - LOCATION
LOCATION: BACK;CHEST
LOCATION: BACK;RIB CAGE
LOCATION: BACK;CHEST

## 2025-06-21 ASSESSMENT — PAIN DESCRIPTION - ORIENTATION
ORIENTATION: RIGHT;LEFT;LOWER
ORIENTATION: RIGHT;LEFT;LOWER
ORIENTATION: RIGHT;LEFT

## 2025-06-21 ASSESSMENT — PAIN DESCRIPTION - DESCRIPTORS
DESCRIPTORS: ACHING
DESCRIPTORS: ACHING
DESCRIPTORS: DISCOMFORT

## 2025-06-21 ASSESSMENT — PAIN DESCRIPTION - ONSET
ONSET: PROGRESSIVE
ONSET: ON-GOING

## 2025-06-21 ASSESSMENT — PAIN SCALES - GENERAL
PAINLEVEL_OUTOF10: 5
PAINLEVEL_OUTOF10: 8
PAINLEVEL_OUTOF10: 8

## 2025-06-21 ASSESSMENT — HEART SCORE: ECG: NORMAL

## 2025-06-21 ASSESSMENT — PAIN DESCRIPTION - FREQUENCY: FREQUENCY: CONTINUOUS

## 2025-06-21 NOTE — DISCHARGE INSTRUCTIONS
Continue taking your propranolol as prescribed by your primary care provider.    Continue checking your blood pressure at home and document these blood pressure readings for follow-up with your primary care provider.    Try to increase foods in your diet that are high in potassium.    You can also take Tylenol or ibuprofen every 6 hours as needed for discomfort/pain.    Make a follow-up appoint with your primary care provider in 3 to 5 days for recheck.    As discussed you can return anytime for worsening of symptoms or new symptoms of concern.

## 2025-06-21 NOTE — ED PROVIDER NOTES
[facility]  eMERGENCY dEPARTMENT eNCOUnter      Pt Name: Marie Suarez  MRN: [id number]  Birthdate 1990  Date of evaluation: 6/21/2025  Provider: SARATH BAL MD    CHIEF COMPLAINT       Chief Complaint   Patient presents with    Chest Pain     Pt states lower bilat chest pain x 1 week. She states her BP has been elevated as well. Today woke up with back pain. Pain increases with inspiration. No coughing. Pt appears anxious.         CRITICAL CARE TIME   Total Critical Care time was 0 minutes, excluding separately reportable procedures.  There was a high probability of clinically significant/life threatening deterioration in the patient's condition which required my urgent intervention.        HISTORY OF PRESENT ILLNESS  (Location/Symptom, Timing/Onset, Context/Setting, Quality, Duration, Modifying Factors, Severity.)   History From: Patient  Limitations to history : None    Marie Suarez is a 35 y.o. female who presents to the emergency department complaining of chest and back pain for a week.  She states the pain is in the sides of her chest bilaterally, it radiates into her back.  Somewhat worse with inspiration.  There is some constant pressure-like discomfort but it becomes somewhat sharp with inspiration.  She states her blood pressure is \"up-and-down\".  She states when her blood pressure is up or when she is feeling anxious she has propranolol prescribed.  She states she has been taking it 2 or 3 times a day lately.  She has not taken any today.  She does admit to increased stressors.  She states her mother was rushed to the hospital yesterday with chest pain with concerns about a heart attack.  She has had no recent illness.  No cough.  No shortness of breath.  No leg pain or leg swelling.  No recent travel or immobilization.  Her last menstrual period was 2 weeks ago      Nursing Notes were reviewed and I agree.      SCREENINGS        Chai Coma Scale  Eye Opening:

## 2025-06-22 LAB
EKG ATRIAL RATE: 102 BPM
EKG DIAGNOSIS: NORMAL
EKG P AXIS: 24 DEGREES
EKG P-R INTERVAL: 126 MS
EKG Q-T INTERVAL: 336 MS
EKG QRS DURATION: 78 MS
EKG QTC CALCULATION (BAZETT): 437 MS
EKG R AXIS: 34 DEGREES
EKG T AXIS: 22 DEGREES
EKG VENTRICULAR RATE: 102 BPM

## 2025-06-23 ENCOUNTER — OFFICE VISIT (OUTPATIENT)
Dept: FAMILY MEDICINE CLINIC | Age: 35
End: 2025-06-23
Payer: COMMERCIAL

## 2025-06-23 VITALS
WEIGHT: 167 LBS | DIASTOLIC BLOOD PRESSURE: 98 MMHG | BODY MASS INDEX: 31.53 KG/M2 | RESPIRATION RATE: 16 BRPM | HEIGHT: 61 IN | SYSTOLIC BLOOD PRESSURE: 138 MMHG | HEART RATE: 98 BPM | OXYGEN SATURATION: 98 %

## 2025-06-23 DIAGNOSIS — F41.9 ANXIETY: ICD-10-CM

## 2025-06-23 DIAGNOSIS — I10 UNCONTROLLED HYPERTENSION: ICD-10-CM

## 2025-06-23 DIAGNOSIS — R07.9 CHEST PAIN, UNSPECIFIED TYPE: Primary | ICD-10-CM

## 2025-06-23 PROCEDURE — 3080F DIAST BP >= 90 MM HG: CPT

## 2025-06-23 PROCEDURE — 93010 ELECTROCARDIOGRAM REPORT: CPT | Performed by: INTERNAL MEDICINE

## 2025-06-23 PROCEDURE — G8427 DOCREV CUR MEDS BY ELIG CLIN: HCPCS

## 2025-06-23 PROCEDURE — G2211 COMPLEX E/M VISIT ADD ON: HCPCS

## 2025-06-23 PROCEDURE — 1036F TOBACCO NON-USER: CPT

## 2025-06-23 PROCEDURE — 99214 OFFICE O/P EST MOD 30 MIN: CPT

## 2025-06-23 PROCEDURE — 3075F SYST BP GE 130 - 139MM HG: CPT

## 2025-06-23 PROCEDURE — G8417 CALC BMI ABV UP PARAM F/U: HCPCS

## 2025-06-23 RX ORDER — PROPRANOLOL HCL 20 MG
20 TABLET ORAL 2 TIMES DAILY
Qty: 180 TABLET | Refills: 1 | Status: SHIPPED | OUTPATIENT
Start: 2025-06-23

## 2025-06-23 ASSESSMENT — ENCOUNTER SYMPTOMS
SHORTNESS OF BREATH: 0
CHEST TIGHTNESS: 0
ABDOMINAL PAIN: 0

## 2025-06-23 NOTE — PROGRESS NOTES
6/23/2025    This is a 35 y.o. female   Chief Complaint   Patient presents with    Follow-up     Pt is feeling a little bit better still having issues with high bp she also has cyst on her ovaries that burst.  Bp has been high over a week she is taking the propanolol 3 x a day and a low dose asprin    .    MILAGROS Salazar is here for follow up from a ER visit on 6/21/25.     Marie presented to the ER with chest and back pain for a week  She states she has noticed that her BP has also been high with these symptoms  She admits that she is more stressed than normal  She states that she had a cyst on her ovary that ruptured on Friday - thought maybe the flare up could be caused from this  Symptoms she experienced which caused her to go to the ER included lightheaded, chest tightness, back pain, and pain with inspiration - all beginning Saturday  She states she takes propanolol once daily but has been taking it three times daily since the event  She monitors her BP daily - running 140-160/100-110, will spike higher randomly    In the ER, they performed the following tests with the given results:  EKG - sinus tachycardia with a rate of 102, otherwise normal EKG  XR chest - no evidence of acute chest disease  D-dimer and troponin levels normal    Stress test recommended by ER as well as low dose aspirin daily, also recommended possible increased dose of propranolol.      Patient Active Problem List   Diagnosis    Allergic rhinitis, seasonal    Migraine    Recurrent major depressive disorder, in full remission    Dysfunctional uterine bleeding    Other congenital anomaly of lower limb, including pelvic girdle    Congenital anomaly of upper limb    Anxiety    Chronic bilateral low back pain without sciatica    ERVIN (obstructive sleep apnea)    Idiopathic hypersomnia    Mood disorder    Tobacco dependence    Obesity (BMI 30.0-34.9)    COVID-19 vaccination declined    Bipolar disorder, in partial remission, most recent episode manic